# Patient Record
Sex: FEMALE | Race: OTHER | HISPANIC OR LATINO | Employment: FULL TIME | URBAN - METROPOLITAN AREA
[De-identification: names, ages, dates, MRNs, and addresses within clinical notes are randomized per-mention and may not be internally consistent; named-entity substitution may affect disease eponyms.]

---

## 2017-03-21 ENCOUNTER — CONVERSION ENCOUNTER (OUTPATIENT)
Dept: MAMMOGRAPHY | Facility: CLINIC | Age: 52
End: 2017-03-21

## 2018-02-05 ENCOUNTER — CLINICAL SUPPORT (OUTPATIENT)
Dept: PAIN MEDICINE | Facility: CLINIC | Age: 53
End: 2018-02-05
Payer: COMMERCIAL

## 2018-02-05 ENCOUNTER — TELEPHONE (OUTPATIENT)
Dept: RADIOLOGY | Facility: CLINIC | Age: 53
End: 2018-02-05

## 2018-02-05 VITALS
WEIGHT: 98.4 LBS | DIASTOLIC BLOOD PRESSURE: 74 MMHG | HEIGHT: 59 IN | HEART RATE: 88 BPM | SYSTOLIC BLOOD PRESSURE: 104 MMHG | TEMPERATURE: 99 F | BODY MASS INDEX: 19.84 KG/M2

## 2018-02-05 DIAGNOSIS — M48.02 CERVICAL SPINAL STENOSIS: ICD-10-CM

## 2018-02-05 DIAGNOSIS — G89.29 CHRONIC BILATERAL LOW BACK PAIN WITHOUT SCIATICA: ICD-10-CM

## 2018-02-05 DIAGNOSIS — M25.50 POLYARTHRALGIA: ICD-10-CM

## 2018-02-05 DIAGNOSIS — M79.18 MYOFASCIAL PAIN SYNDROME: ICD-10-CM

## 2018-02-05 DIAGNOSIS — M54.12 CERVICAL RADICULOPATHY: Primary | ICD-10-CM

## 2018-02-05 DIAGNOSIS — R20.2 PARESTHESIA OF LOWER EXTREMITY: ICD-10-CM

## 2018-02-05 DIAGNOSIS — M54.50 CHRONIC BILATERAL LOW BACK PAIN WITHOUT SCIATICA: ICD-10-CM

## 2018-02-05 PROCEDURE — 99204 OFFICE O/P NEW MOD 45 MIN: CPT | Performed by: ANESTHESIOLOGY

## 2018-02-05 RX ORDER — ESOMEPRAZOLE MAGNESIUM 40 MG/1
40 CAPSULE, DELAYED RELEASE ORAL
COMMUNITY
End: 2018-08-16 | Stop reason: ALTCHOICE

## 2018-02-05 RX ORDER — TIZANIDINE 2 MG/1
2 TABLET ORAL EVERY 8 HOURS PRN
Qty: 90 TABLET | Refills: 1 | Status: SHIPPED | OUTPATIENT
Start: 2018-02-05 | End: 2018-02-05 | Stop reason: SDUPTHER

## 2018-02-05 RX ORDER — ASPIRIN 81 MG/1
81 TABLET ORAL DAILY
COMMUNITY
End: 2018-12-20 | Stop reason: ALTCHOICE

## 2018-02-05 RX ORDER — MONTELUKAST SODIUM 10 MG/1
10 TABLET ORAL
COMMUNITY
End: 2018-08-16 | Stop reason: ALTCHOICE

## 2018-02-05 RX ORDER — LORATADINE 10 MG/1
10 TABLET ORAL DAILY
COMMUNITY

## 2018-02-05 RX ORDER — COVID-19 ANTIGEN TEST
KIT MISCELLANEOUS
COMMUNITY
End: 2019-08-28 | Stop reason: ALTCHOICE

## 2018-02-05 RX ORDER — FLUTICASONE PROPIONATE 50 MCG
1 SPRAY, SUSPENSION (ML) NASAL DAILY
COMMUNITY
End: 2018-08-16 | Stop reason: ALTCHOICE

## 2018-02-05 RX ORDER — ALPRAZOLAM 0.5 MG/1
TABLET ORAL
COMMUNITY
End: 2018-07-30 | Stop reason: SDUPTHER

## 2018-02-05 RX ORDER — TOPIRAMATE 15 MG/1
15 CAPSULE, COATED PELLETS ORAL 2 TIMES DAILY
COMMUNITY
End: 2018-08-16 | Stop reason: ALTCHOICE

## 2018-02-05 RX ORDER — TIZANIDINE 2 MG/1
2 TABLET ORAL EVERY 8 HOURS PRN
Qty: 90 TABLET | Refills: 0 | Status: SHIPPED | OUTPATIENT
Start: 2018-02-05 | End: 2019-04-30 | Stop reason: ALTCHOICE

## 2018-02-05 NOTE — TELEPHONE ENCOUNTER
RECEIVED SIGNED ANTI COAG HOLD FORM BACK FROM DR Villanueva Head- DATED 2/5/18  COPY WITH PROCEDURE  AND ONE SENT TO BE SCANNED

## 2018-02-05 NOTE — PROGRESS NOTES
Assessment:  1  Cervical radiculopathy    2  Myofascial pain syndrome    3  Cervical spinal stenosis    4  Polyarthralgia    5  Chronic bilateral low back pain without sciatica    6  Paresthesia of lower extremity        Plan:  50-year-old female presenting for initial consultation regarding multiple pain complaints including neck pain and cervical radiculopathy in the bilateral upper extremities secondary to cervical degenerative disc disease and stenosis  The patient also complains of axial lumbosacral back pain which seems to be largely myofascial in nature and there may be a facet mediated component to this  The patient also complains of paresthesias and numbness in her lower extremities from the knees to the feet mainly on the anterior aspect of her legs, however sometimes can encompass the entire lower extremity from the knees to the feet which sounds more like a peripheral neuropathy  The patient did have an EMG of her lower extremities which apparently was incomplete secondary to poor patient tolerance  The patient is seeing a neurologist who had started the patient on Topiramate which is currently at low dose and she has noted some relief since being on the medication for the past 2-3 weeks  She has previously tried duloxetine and gabapentin without much relief  The patient does not have any evidence of radiculopathy on physical exam   She does not have any imaging of her lumbar spine either  The patient also complains of poly arthralgias and considering the patient is only prediabetic and has possible peripheral neuropathy a rheumatologic workup and referral may be reasonable  1   I will schedule the patient for cervical epidural steroid injection to reduce the inflammatory component of her pain  2   The patient will continue with Topiramate as prescribed by her neurologist and she will follow up with them in April    She is noticing some relief in her neuropathic symptoms of her lower extremities since onset of the medication  3  The patient may continue with Aleve p r n , however she minimizes this secondary to GERD  4  The patient will continue with her home exercise program  5  I will trial the patient on tizanidine 2 milligrams q 8 hours p r n  for myofascial pain  6  I will follow up the patient in 6-8 weeks after injection    Complete risks and benefits including bleeding, infection, tissue reaction, nerve injury and allergic reaction were discussed  The approach was demonstrated using models and literature was provided  Verbal and written consent was obtained  My impressions and treatment recommendations were discussed in detail with the patient who verbalized understanding and had no further questions  Discharge instructions were provided  I personally saw and examined the patient and I agree with the above discussed plan of care  No orders of the defined types were placed in this encounter  No orders of the defined types were placed in this encounter  History of Present Illness:    Nena Urena is a 47 y o  female presenting for initial consultation regarding neck pain that radiates into bilateral upper extremities into the 4th and 5th digits with associated numbness and paresthesias  She denies any subjective weakness, bladder or bowel incontinence, or balance issues  The patient also complains of axial lumbosacral back pain and does occasionally feel some numbness and paresthesias from her knees to her feet, particularly over the anterior aspect  The symptoms have been present for at least 2 years  She denies any specific trauma or inciting event to her neck or low back  MRI of the cervical spine reveals a pseudo disc bulge at C4-5 resulting in mild-to-moderate central and foraminal stenosis and a tiny disc protrusion at C5-6 and uncinate hypertrophy at C7-T1 with slight right foraminal stenosis at C7-T1    The patient does have an EMG of her upper extremities revealing C8-T1 radiculopathy per chart review  However, I do not have the results from this study  The EMG that was recently performed was an incomplete study secondary to poor patient tolerance  She does not have any imaging of her lumbar spine  She also complains of poly arthralgias, mainly in her major joints  She has never seen a rheumatologist for this  She is currently following with a neurologist who is treating her with Topiramate 15 milligrams b i d  for peripheral neuropathy per the patient  She does find some relief with this and has been on this dose for about 3 weeks  She follows up with the neurologist in April  She has previously tried Cymbalta and gabapentin, however was unable to tolerate the sedation  The patient rates her pain as 7/10 on the pain is constant  The pain does not follow any particular pattern throughout the day  The pain is described as dull and aching  The pain is increased with standing, bending at the neck and waste, sitting, and walking  The pain is alleviated somewhat with relaxation  She has not gotten any relief from heat or ice application or physical therapy in the past     I have personally reviewed and/or updated the patient's past medical history, past surgical history, family history, social history, allergies, and vital signs today  Other than as stated above, the patient denies any interval changes in medications, medical condition, mental condition, symptoms, or allergies since the last office visit  Review of Systems:    Review of Systems   Constitutional: Negative for fever and unexpected weight change  HENT: Negative for trouble swallowing  Eyes: Negative for visual disturbance  Respiratory: Negative for shortness of breath and wheezing  Cardiovascular: Negative for chest pain and palpitations  Gastrointestinal: Negative for constipation, diarrhea, nausea and vomiting     Endocrine: Negative for cold intolerance, heat intolerance and polydipsia  Genitourinary: Positive for frequency  Negative for difficulty urinating  Musculoskeletal: Negative for arthralgias, joint swelling and myalgias  Joint stiffness    Skin: Negative for rash  Neurological: Positive for dizziness  Negative for seizures, syncope, weakness and headaches  Hematological: Does not bruise/bleed easily  Psychiatric/Behavioral: Negative for dysphoric mood  All other systems reviewed and are negative  There is no problem list on file for this patient  Past Medical History:   Diagnosis Date    Anxiety     GERD (gastroesophageal reflux disease)        Past Surgical History:   Procedure Laterality Date    CARPAL TUNNEL RELEASE       SECTION      ECTOPIC PREGNANCY SURGERY      HYSTERECTOMY      TONSILECTOMY AND ADNOIDECTOMY         Family History   Problem Relation Age of Onset    Asthma Mother     Hypertension Father     Ulcers Brother     Thyroid disease Other     Stroke Other        Social History     Occupational History    Not on file  Social History Main Topics    Smoking status: Not on file    Smokeless tobacco: Not on file    Alcohol use Not on file    Drug use: Unknown    Sexual activity: Not on file       No current outpatient prescriptions on file prior to visit  No current facility-administered medications on file prior to visit  Allergies   Allergen Reactions    Gabapentin Hives       Physical Exam:    /74   Pulse 88   Temp 99 °F (37 2 °C)   Ht 4' 11" (1 499 m)   Wt 44 6 kg (98 lb 6 4 oz)   BMI 19 87 kg/m²     Constitutional: normal, well developed, well nourished, alert, in no distress and non-toxic and no overt pain behavior    Eyes: anicteric  HEENT: grossly intact  Neck: supple, symmetric, trachea midline and no masses   Pulmonary:even and unlabored  Cardiovascular:No edema or pitting edema present  Skin:Normal without rashes or lesions and well hydrated  Psychiatric:Mood and affect appropriate  Neurologic:Cranial Nerves II-XII grossly intact  Musculoskeletal:normal gait  Bilateral cervical paraspinals and trapezii tender to palpation and ropy in texture  Positive Spurling's to the left and negative to the right  Bilateral upper extremity strength 5/5 in all muscle groups  Bilateral brachioradialis, biceps, and triceps reflexes were 2/4 bilaterally  Negative Leahy's reflex  Bilateral patellar and Achilles reflexes were 2/4  Negative clonus bilaterally  Bilateral lumbar paraspinals tender to palpation and ropy in texture  Negative straight leg raise bilaterally  Negative Bill's tests bilaterally  Bilateral lower extremity strength 5/5 in all muscle groups      Imaging:     MRI Cervical spine dated 12/21/2017    Pseudo-disc bulge at C4-5 with resulting mild to moderate spinal stenosis   Tiny central disc protrusion at C5-6 and right uncinate hypertrophy at C7-T1

## 2018-02-05 NOTE — TELEPHONE ENCOUNTER
Faxed anti coag hold form to dr Joselito Chiu at [facility] at [phone], phone- [phone]  Pt to hold aspirin and needs to be scheduled for HECTOR with Dr Nikko Kern in [address]

## 2018-02-05 NOTE — TELEPHONE ENCOUNTER
Called pt, scheduled HECTOR on 2/21/18 w Dr Jihan Hernandez in [place]  Pt last dose of ASA is 2/14, 6 day hold  Went over pre procedure instructions, NPO 1 hr prior, if sick or on abx needs to call to rs, wear loose, comf clothing- no buttons/zippers, needs   Pt verbalized understanding

## 2018-02-21 ENCOUNTER — HOSPITAL ENCOUNTER (OUTPATIENT)
Dept: RADIOLOGY | Facility: CLINIC | Age: 53
Discharge: HOME | End: 2018-02-21
Admitting: ANESTHESIOLOGY
Payer: COMMERCIAL

## 2018-02-21 VITALS
DIASTOLIC BLOOD PRESSURE: 74 MMHG | TEMPERATURE: 97 F | HEART RATE: 97 BPM | RESPIRATION RATE: 16 BRPM | SYSTOLIC BLOOD PRESSURE: 110 MMHG | OXYGEN SATURATION: 100 %

## 2018-02-21 DIAGNOSIS — M54.12 CERVICAL RADICULOPATHY: ICD-10-CM

## 2018-02-21 PROCEDURE — 62321 NJX INTERLAMINAR CRV/THRC: CPT | Performed by: ANESTHESIOLOGY

## 2018-02-21 RX ORDER — LIDOCAINE HYDROCHLORIDE 10 MG/ML
5 INJECTION, SOLUTION EPIDURAL; INFILTRATION; INTRACAUDAL; PERINEURAL ONCE
Status: COMPLETED | OUTPATIENT
Start: 2018-02-21 | End: 2018-02-21

## 2018-02-21 RX ORDER — PAPAVERINE HCL 150 MG
10 CAPSULE, EXTENDED RELEASE ORAL ONCE
Status: COMPLETED | OUTPATIENT
Start: 2018-02-21 | End: 2018-02-21

## 2018-02-21 RX ADMIN — DEXAMETHASONE SODIUM PHOSPHATE 10 MG: 10 INJECTION, SOLUTION INTRAMUSCULAR; INTRAVENOUS at 10:42

## 2018-02-21 RX ADMIN — LIDOCAINE HYDROCHLORIDE 3 ML: 10 INJECTION, SOLUTION EPIDURAL; INFILTRATION; INTRACAUDAL; PERINEURAL at 10:43

## 2018-02-21 RX ADMIN — IOHEXOL 1 ML: 300 INJECTION, SOLUTION INTRAVENOUS at 10:45

## 2018-02-21 NOTE — DISCHARGE INSTRUCTIONS
Epidural Steroid Injection   WHAT YOU NEED TO KNOW:   An epidural steroid injection (OTILIA) is a procedure to inject steroid medicine into the epidural space  The epidural space is between your spinal cord and vertebrae  Steroids reduce inflammation and fluid buildup in your spine that may be causing pain  You may be given pain medicine along with the steroids  ACTIVITY  · Do not drive or operate machinery today  · No strenuous activity today - bending, lifting, etc   · You may resume normal activites starting tomorrow - start slowly and as tolerated  · You may shower today, but no tub baths or hot tubs  · You may have numbness for several hours from the local anesthetic  Please use caution and common sense, especially with weight-bearing activities  CARE OF THE INJECTION SITE  · If you have soreness or pain, apply ice to the area today (20 minutes on/20 minutes off)  · Starting tomorrow, you may use warm, moist heat or ice if needed  · You may have an increase or change in your discomfort for 36-48 hours after your treatment  · Apply ice and continue with any pain medication you have been prescribed  · Notify the [facility] if you have any of the following: redness, drainage, swelling, headache, stiff neck or fever above 100°F     SPECIAL INSTRUCTIONS  · Our office will contact you in approximately 7 days for a progress report  MEDICATIONS  · Continue to take all routine medications  · Our office may have instructed you to hold some medications  If you have a problem specifically related to your procedure, please call our office at [phone]  Problems not related to your procedure should be directed to your primary care physician

## 2018-02-21 NOTE — H&P
History of Present Illness: The patient is a 47 y o  female who presents with complaints of neck and arm pain      Patient Active Problem List   Diagnosis    Cervical radiculopathy    Myofascial pain syndrome    Cervical spinal stenosis    Polyarthralgia    Chronic bilateral low back pain without sciatica    Paresthesia of lower extremity       Past Medical History:   Diagnosis Date    Anxiety     Cervical radiculopathy     Cervical spinal stenosis     GERD (gastroesophageal reflux disease)     Myofascial pain syndrome     Polyarthralgia     TIA (transient ischemic attack)        Past Surgical History:   Procedure Laterality Date    CARPAL TUNNEL RELEASE       SECTION      ECTOPIC PREGNANCY SURGERY      HYSTERECTOMY      TONSILECTOMY AND ADNOIDECTOMY           Current Outpatient Prescriptions:     ALPRAZolam (XANAX) 0 5 mg tablet, Take by mouth daily at bedtime as needed for anxiety, Disp: , Rfl:     aspirin (ECOTRIN LOW STRENGTH) 81 mg EC tablet, Take 81 mg by mouth daily, Disp: , Rfl:     esomeprazole (NexIUM) 40 MG capsule, Take 40 mg by mouth every morning before breakfast, Disp: , Rfl:     fluticasone (FLONASE) 50 mcg/act nasal spray, 1 spray into each nostril daily, Disp: , Rfl:     loratadine (CLARITIN) 10 mg tablet, Take 10 mg by mouth daily, Disp: , Rfl:     montelukast (SINGULAIR) 10 mg tablet, Take 10 mg by mouth daily at bedtime, Disp: , Rfl:     Naproxen Sodium (ALEVE) 220 MG CAPS, Take by mouth, Disp: , Rfl:     RaNITidine HCl (RANITIDINE 75 PO), Take by mouth, Disp: , Rfl:     tiZANidine (ZANAFLEX) 2 mg tablet, Take 1 tablet (2 mg total) by mouth every 8 (eight) hours as needed for muscle spasms, Disp: 90 tablet, Rfl: 0    topiramate (TOPAMAX) 15 mg capsule, Take 15 mg by mouth 2 (two) times a day, Disp: , Rfl:     Allergies   Allergen Reactions    Gabapentin Hives       Physical Exam:   Vitals:    18 0952   BP: 121/85   Pulse: 87   Resp: 18   Temp: (!) 97 °F (36 1 °C)   SpO2: 100%     General: Awake, Alert, Oriented x 3, Mood and affect appropriate  Respiratory: Respirations even and unlabored  Cardiovascular: Peripheral pulses intact; no edema  Musculoskeletal Exam:  Bilateral cervical paraspinals  Tender to palpation and ropy in texture  ASA Score: 2    Assessment:  Cervical radiculopathy    Plan: HECTOR        Assessment:  1  Cervical radiculopathy    2  Myofascial pain syndrome    3  Cervical spinal stenosis    4  Polyarthralgia    5  Chronic bilateral low back pain without sciatica    6  Paresthesia of lower extremity          Plan:  28-year-old female presenting for initial consultation regarding multiple pain complaints including neck pain and cervical radiculopathy in the bilateral upper extremities secondary to cervical degenerative disc disease and stenosis  The patient also complains of axial lumbosacral back pain which seems to be largely myofascial in nature and there may be a facet mediated component to this  The patient also complains of paresthesias and numbness in her lower extremities from the knees to the feet mainly on the anterior aspect of her legs, however sometimes can encompass the entire lower extremity from the knees to the feet which sounds more like a peripheral neuropathy  The patient did have an EMG of her lower extremities which apparently was incomplete secondary to poor patient tolerance  The patient is seeing a neurologist who had started the patient on Topiramate which is currently at low dose and she has noted some relief since being on the medication for the past 2-3 weeks  She has previously tried duloxetine and gabapentin without much relief  The patient does not have any evidence of radiculopathy on physical exam   She does not have any imaging of her lumbar spine either      The patient also complains of poly arthralgias and considering the patient is only prediabetic and has possible peripheral neuropathy a rheumatologic workup and referral may be reasonable  1   I will schedule the patient for cervical epidural steroid injection to reduce the inflammatory component of her pain  2   The patient will continue with Topiramate as prescribed by her neurologist and she will follow up with them in April  She is noticing some relief in her neuropathic symptoms of her lower extremities since onset of the medication  3  The patient may continue with Aleve p r n , however she minimizes this secondary to GERD  4  The patient will continue with her home exercise program  5  I will trial the patient on tizanidine 2 milligrams q 8 hours p r n  for myofascial pain  6  I will follow up the patient in 6-8 weeks after injection     Complete risks and benefits including bleeding, infection, tissue reaction, nerve injury and allergic reaction were discussed  The approach was demonstrated using models and literature was provided  Verbal and written consent was obtained      My impressions and treatment recommendations were discussed in detail with the patient who verbalized understanding and had no further questions  Discharge instructions were provided  I personally saw and examined the patient and I agree with the above discussed plan of care      No orders of the defined types were placed in this encounter      No orders of the defined types were placed in this encounter         History of Present Illness:    Cristian Vigil is a 47 y o  female presenting for initial consultation regarding neck pain that radiates into bilateral upper extremities into the 4th and 5th digits with associated numbness and paresthesias  She denies any subjective weakness, bladder or bowel incontinence, or balance issues  The patient also complains of axial lumbosacral back pain and does occasionally feel some numbness and paresthesias from her knees to her feet, particularly over the anterior aspect    The symptoms have been present for at least 2 years   She denies any specific trauma or inciting event to her neck or low back  MRI of the cervical spine reveals a pseudo disc bulge at C4-5 resulting in mild-to-moderate central and foraminal stenosis and a tiny disc protrusion at C5-6 and uncinate hypertrophy at C7-T1 with slight right foraminal stenosis at C7-T1  The patient does have an EMG of her upper extremities revealing C8-T1 radiculopathy per chart review  However, I do not have the results from this study  The EMG that was recently performed was an incomplete study secondary to poor patient tolerance  She does not have any imaging of her lumbar spine  She also complains of poly arthralgias, mainly in her major joints  She has never seen a rheumatologist for this  She is currently following with a neurologist who is treating her with Topiramate 15 milligrams b i d  for peripheral neuropathy per the patient  She does find some relief with this and has been on this dose for about 3 weeks  She follows up with the neurologist in April  She has previously tried Cymbalta and gabapentin, however was unable to tolerate the sedation  The patient rates her pain as 7/10 on the pain is constant  The pain does not follow any particular pattern throughout the day  The pain is described as dull and aching  The pain is increased with standing, bending at the neck and waste, sitting, and walking  The pain is alleviated somewhat with relaxation  She has not gotten any relief from heat or ice application or physical therapy in the past      I have personally reviewed and/or updated the patient's past medical history, past surgical history, family history, social history, allergies, and vital signs today  Other than as stated above, the patient denies any interval changes in medications, medical condition, mental condition, symptoms, or allergies since the last office visit     Review of Systems:     Review of Systems   Constitutional: Negative for fever and unexpected weight change  HENT: Negative for trouble swallowing  Eyes: Negative for visual disturbance  Respiratory: Negative for shortness of breath and wheezing  Cardiovascular: Negative for chest pain and palpitations  Gastrointestinal: Negative for constipation, diarrhea, nausea and vomiting  Endocrine: Negative for cold intolerance, heat intolerance and polydipsia  Genitourinary: Positive for frequency  Negative for difficulty urinating  Musculoskeletal: Negative for arthralgias, joint swelling and myalgias  Joint stiffness    Skin: Negative for rash  Neurological: Positive for dizziness  Negative for seizures, syncope, weakness and headaches  Hematological: Does not bruise/bleed easily  Psychiatric/Behavioral: Negative for dysphoric mood     All other systems reviewed and are negative         There is no problem list on file for this patient         Medical History        Past Medical History:   Diagnosis Date    Anxiety      GERD (gastroesophageal reflux disease)              Surgical History         Past Surgical History:   Procedure Laterality Date    CARPAL TUNNEL RELEASE         SECTION        ECTOPIC PREGNANCY SURGERY        HYSTERECTOMY        TONSILECTOMY AND ADNOIDECTOMY                      Family History   Problem Relation Age of Onset    Asthma Mother      Hypertension Father      Ulcers Brother      Thyroid disease Other      Stroke Other           Social History          Occupational History    Not on file            Social History Main Topics    Smoking status: Not on file    Smokeless tobacco: Not on file    Alcohol use Not on file    Drug use: Unknown    Sexual activity: Not on file         No current outpatient prescriptions on file prior to visit       No current facility-administered medications on file prior to visit          Allergies   Allergen Reactions    Gabapentin Hives         Physical Exam:     /74   Pulse 88   Temp 99 °F (37 2 °C)   Ht 4' 11" (1 499 m)   Wt 44 6 kg (98 lb 6 4 oz)   BMI 19 87 kg/m²      Constitutional: normal, well developed, well nourished, alert, in no distress and non-toxic and no overt pain behavior  Eyes: anicteric  HEENT: grossly intact  Neck: supple, symmetric, trachea midline and no masses   Pulmonary:even and unlabored  Cardiovascular:No edema or pitting edema present  Skin:Normal without rashes or lesions and well hydrated  Psychiatric:Mood and affect appropriate  Neurologic:Cranial Nerves II-XII grossly intact  Musculoskeletal:normal gait  Bilateral cervical paraspinals and trapezii tender to palpation and ropy in texture  Positive Spurling's to the left and negative to the right  Bilateral upper extremity strength 5/5 in all muscle groups  Bilateral brachioradialis, biceps, and triceps reflexes were 2/4 bilaterally  Negative Leahy's reflex  Bilateral patellar and Achilles reflexes were 2/4  Negative clonus bilaterally  Bilateral lumbar paraspinals tender to palpation and ropy in texture  Negative straight leg raise bilaterally  Negative Bill's tests bilaterally  Bilateral lower extremity strength 5/5 in all muscle groups

## 2018-02-22 ENCOUNTER — TELEPHONE (OUTPATIENT)
Dept: PAIN MEDICINE | Facility: MEDICAL CENTER | Age: 53
End: 2018-02-22

## 2018-02-22 NOTE — TELEPHONE ENCOUNTER
S/w pt, she is s/p HECTOR yesterday 2/21, since the numbing wore off last night she has been having cramping pain in her knees and feet  Pt said the pain comes and goes and that Aleve is helping short term but the pain comes back  Pt is wondering if this could be a side effect  I explained that it could be from positioning on the table during the procedure, I encouraged the pt to continue using the Aleve if it is helping  Pt had to call off work today and is requesting a work excuse for today  Please advise, thanks

## 2018-02-22 NOTE — TELEPHONE ENCOUNTER
Ok to provide pt with work note that she may have today off as a result of discomfort from [place] and can return to work tomorrow

## 2018-02-22 NOTE — TELEPHONE ENCOUNTER
Pt is calling and had injection yesterday and her leg starting hurting her and wants to know if that is a side effect   Please call pt back at [phone]

## 2018-02-23 NOTE — TELEPHONE ENCOUNTER
Attempted to call the patient and left a detailed mom in regards to picking up the excuse  Pt  To CB if any questions, office hours provided

## 2018-02-28 ENCOUNTER — TELEPHONE (OUTPATIENT)
Dept: PAIN MEDICINE | Facility: CLINIC | Age: 53
End: 2018-02-28

## 2018-03-01 NOTE — TELEPHONE ENCOUNTER
Patient reports over all about 75% relief - she said that after the injection she started to feel sick with cold like symptoms and not she is starting to feel better  I advised patient she may continue to see relief  She will f/u PRN

## 2018-03-13 NOTE — TELEPHONE ENCOUNTER
Pt called and stated she doesn't need a follow up because she feels fine  She was upset that she received a bill and stated she should have been told that her procedure wouldn't be covered 100% because she received a bill for $800

## 2018-03-22 ENCOUNTER — CONVERSION ENCOUNTER (OUTPATIENT)
Dept: MAMMOGRAPHY | Facility: CLINIC | Age: 53
End: 2018-03-22

## 2018-03-29 ENCOUNTER — TRANSCRIBE ORDERS (OUTPATIENT)
Dept: ADMINISTRATIVE | Facility: HOSPITAL | Age: 53
End: 2018-03-29

## 2018-03-29 DIAGNOSIS — R10.13 EPIGASTRIC PAIN: Primary | ICD-10-CM

## 2018-04-10 LAB
ABSOL LYMPHOCYTES (HISTORICAL): 2.3 K/UL (ref 0.5–4)
ALBUMIN SERPL BCP-MCNC: 5 G/DL (ref 3–5.2)
ALP SERPL-CCNC: 117 U/L (ref 43–122)
ALT SERPL W P-5'-P-CCNC: 25 U/L (ref 9–52)
AMORPHOUS MATERIAL (HISTORICAL): ABNORMAL
ANION GAP SERPL CALCULATED.3IONS-SCNC: 13 MMOL/L (ref 5–14)
AST SERPL W P-5'-P-CCNC: 29 U/L (ref 14–36)
BACTERIA UR QL AUTO: ABNORMAL
BASOPHILS # BLD AUTO: 0 K/UL (ref 0–0.1)
BASOPHILS # BLD AUTO: 1 % (ref 0–1)
BILIRUB SERPL-MCNC: 0.3 MG/DL
BILIRUB UR QL STRIP: NEGATIVE MG/DL
BUN SERPL-MCNC: 15 MG/DL (ref 5–25)
CALCIUM SERPL-MCNC: 10 MG/DL (ref 8.4–10.2)
CASTS/CASTS TYPE (HISTORICAL): ABNORMAL /LPF
CHLORIDE SERPL-SCNC: 105 MEQ/L (ref 97–108)
CLARITY UR: CLEAR
CO2 SERPL-SCNC: 25 MMOL/L (ref 22–30)
COLOR UR: ABNORMAL
CREATINE, SERUM (HISTORICAL): 0.59 MG/DL (ref 0.6–1.2)
CRYSTAL TYPE (HISTORICAL): ABNORMAL /HPF
DEPRECATED RDW RBC AUTO: 14.2 %
EGFR (HISTORICAL): >60 ML/MIN/1.73 M2
EOSINOPHIL # BLD AUTO: 0.1 K/UL (ref 0–0.4)
EOSINOPHIL NFR BLD AUTO: 1 % (ref 0–6)
GLUCOSE SERPL-MCNC: 87 MG/DL (ref 70–99)
GLUCOSE UR STRIP-MCNC: NEGATIVE MG/DL
HCT VFR BLD AUTO: 44.5 % (ref 36–46)
HGB BLD-MCNC: 14.5 G/DL (ref 12–16)
HGB UR QL STRIP.AUTO: ABNORMAL
KETONES UR STRIP-MCNC: NEGATIVE MG/DL
LEUKOCYTE ESTERASE UR QL STRIP: NEGATIVE
LYMPHOCYTES NFR BLD AUTO: 36 % (ref 25–45)
MCH RBC QN AUTO: 28.9 PG (ref 26–34)
MCHC RBC AUTO-ENTMCNC: 32.7 % (ref 31–36)
MCV RBC AUTO: 89 FL (ref 80–100)
MONOCYTES # BLD AUTO: 0.3 K/UL (ref 0.2–0.9)
MONOCYTES NFR BLD AUTO: 4 % (ref 1–10)
MUCOUS THREADS URNS QL MICRO: ABNORMAL
NEUTROPHILS ABS COUNT (HISTORICAL): 3.8 K/UL (ref 1.8–7.8)
NEUTS SEG NFR BLD AUTO: 58 % (ref 45–65)
NITRITE UR QL STRIP: NEGATIVE
NON-SQ EPI CELLS URNS QL MICRO: ABNORMAL
OTHER STN SPEC: ABNORMAL
PH UR STRIP.AUTO: 6 [PH] (ref 4.5–8)
PLATELET # BLD AUTO: 196 K/MCL (ref 150–450)
POTASSIUM SERPL-SCNC: 4.6 MEQ/L (ref 3.6–5)
PROT UR STRIP-MCNC: NEGATIVE MG/DL
RBC # BLD AUTO: 5.02 M/MCL (ref 4–5.2)
RBC #/AREA URNS AUTO: 1 /HPF
SODIUM SERPL-SCNC: 142 MEQ/L (ref 137–147)
SP GR UR STRIP.AUTO: 1.02 (ref 1–1.04)
TOTAL PROTEIN (HISTORICAL): 7.9 G/DL (ref 5.9–8.4)
UROBILINOGEN UR QL STRIP.AUTO: NEGATIVE MG/DL (ref 0–1)
WBC # BLD AUTO: 6.5 K/MCL (ref 4.5–11)
WBC #/AREA URNS AUTO: ABNORMAL /HPF

## 2018-07-23 DIAGNOSIS — F41.9 ANXIETY: Primary | ICD-10-CM

## 2018-07-23 RX ORDER — ALPRAZOLAM 0.5 MG/1
0.5 TABLET ORAL 2 TIMES DAILY PRN
Qty: 15 TABLET | Refills: 0 | Status: SHIPPED | OUTPATIENT
Start: 2018-07-23 | End: 2019-03-05 | Stop reason: SDUPTHER

## 2018-07-30 DIAGNOSIS — F41.9 ANXIETY: Primary | ICD-10-CM

## 2018-07-30 RX ORDER — ALPRAZOLAM 0.5 MG/1
0.5 TABLET ORAL
Qty: 30 TABLET | Refills: 0 | Status: SHIPPED | OUTPATIENT
Start: 2018-07-30 | End: 2018-11-01 | Stop reason: SDUPTHER

## 2018-08-16 ENCOUNTER — OFFICE VISIT (OUTPATIENT)
Dept: GYNECOLOGY | Facility: CLINIC | Age: 53
End: 2018-08-16
Payer: COMMERCIAL

## 2018-08-16 VITALS
RESPIRATION RATE: 16 BRPM | DIASTOLIC BLOOD PRESSURE: 80 MMHG | BODY MASS INDEX: 19.11 KG/M2 | HEIGHT: 59 IN | SYSTOLIC BLOOD PRESSURE: 132 MMHG | HEART RATE: 88 BPM | WEIGHT: 94.8 LBS

## 2018-08-16 DIAGNOSIS — N95.2 VAGINAL ATROPHY: ICD-10-CM

## 2018-08-16 DIAGNOSIS — N94.10 DYSPAREUNIA, FEMALE: ICD-10-CM

## 2018-08-16 DIAGNOSIS — Z01.411 ENCNTR FOR GYN EXAM (GENERAL) (ROUTINE) W ABNORMAL FINDINGS: Primary | ICD-10-CM

## 2018-08-16 DIAGNOSIS — Z12.31 ENCOUNTER FOR SCREENING MAMMOGRAM FOR BREAST CANCER: ICD-10-CM

## 2018-08-16 PROCEDURE — 99386 PREV VISIT NEW AGE 40-64: CPT | Performed by: NURSE PRACTITIONER

## 2018-08-16 RX ORDER — NORTRIPTYLINE HYDROCHLORIDE 10 MG/1
CAPSULE ORAL EVERY 24 HOURS
COMMUNITY
Start: 2018-05-18 | End: 2018-12-20 | Stop reason: ALTCHOICE

## 2018-08-16 NOTE — PATIENT INSTRUCTIONS
Calcium 1200-1500mg + 600-1000 IU Vit D daily  Paps no longer necessary  Annual mammogram Colonoscopy-done and states not due  Monthly BSE  Exercise 150 minutes per week minimum  Referred to [facility] PT for dyspareunia  Rx sent to pharmacy for vaginal estrogen  Discussed benefits risks and alternative  Silicone based lubricant with sex
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
## 2018-08-20 ENCOUNTER — TELEPHONE (OUTPATIENT)
Dept: GYNECOLOGY | Facility: CLINIC | Age: 53
End: 2018-08-20

## 2018-08-20 NOTE — TELEPHONE ENCOUNTER
Patient aware you will be back in the office on Monday  Needs Ref   For dermatologist  Can this be put in?

## 2018-08-20 NOTE — TELEPHONE ENCOUNTER
Quinn Ambrose,  please ask her what is the reason see the dermatologist for us to give her a referral

## 2018-08-21 DIAGNOSIS — L98.9 SKIN LESIONS: Primary | ICD-10-CM

## 2018-08-21 NOTE — TELEPHONE ENCOUNTER
I spoke with Shara about this the day the patient was seen but the ref  Was never put in  Shara said if she needed one she would do it  There were "spots" that she was concerned about  I couldn't find anything in the notes though

## 2018-08-22 ENCOUNTER — TRANSCRIBE ORDERS (OUTPATIENT)
Dept: ADMINISTRATIVE | Facility: HOSPITAL | Age: 53
End: 2018-08-22

## 2018-08-22 ENCOUNTER — APPOINTMENT (OUTPATIENT)
Dept: LAB | Facility: HOSPITAL | Age: 53
End: 2018-08-22
Payer: COMMERCIAL

## 2018-08-22 DIAGNOSIS — Z00.8 HEALTH EXAMINATION IN POPULATION SURVEY: ICD-10-CM

## 2018-08-22 DIAGNOSIS — Z00.8 HEALTH EXAMINATION IN POPULATION SURVEY: Primary | ICD-10-CM

## 2018-08-22 LAB
CHOLEST SERPL-MCNC: 175 MG/DL
EST. AVERAGE GLUCOSE BLD GHB EST-MCNC: 126 MG/DL
HBA1C MFR BLD: 6 % (ref 4.2–6.3)
HDLC SERPL-MCNC: 66 MG/DL (ref 40–59)
LDLC SERPL CALC-MCNC: 91 MG/DL
NONHDLC SERPL-MCNC: 109 MG/DL
TRIGL SERPL-MCNC: 90 MG/DL

## 2018-08-22 PROCEDURE — 83036 HEMOGLOBIN GLYCOSYLATED A1C: CPT | Performed by: PREVENTIVE MEDICINE

## 2018-08-22 PROCEDURE — 36415 COLL VENOUS BLD VENIPUNCTURE: CPT | Performed by: PREVENTIVE MEDICINE

## 2018-08-22 PROCEDURE — 80061 LIPID PANEL: CPT

## 2018-08-23 ENCOUNTER — OFFICE VISIT (OUTPATIENT)
Dept: FAMILY MEDICINE CLINIC | Facility: CLINIC | Age: 53
End: 2018-08-23
Payer: COMMERCIAL

## 2018-08-23 VITALS
TEMPERATURE: 97.2 F | SYSTOLIC BLOOD PRESSURE: 104 MMHG | HEIGHT: 59 IN | DIASTOLIC BLOOD PRESSURE: 82 MMHG | OXYGEN SATURATION: 97 % | RESPIRATION RATE: 16 BRPM | HEART RATE: 89 BPM | BODY MASS INDEX: 19.44 KG/M2 | WEIGHT: 96.4 LBS

## 2018-08-23 DIAGNOSIS — R09.81 STUFFY NOSE: Primary | ICD-10-CM

## 2018-08-23 PROCEDURE — 3008F BODY MASS INDEX DOCD: CPT | Performed by: FAMILY MEDICINE

## 2018-08-23 PROCEDURE — 99213 OFFICE O/P EST LOW 20 MIN: CPT | Performed by: FAMILY MEDICINE

## 2018-08-23 NOTE — PATIENT INSTRUCTIONS
Diagnoses and all orders for this visit:    Stuffy nose  Comments:  Likely related to allergy  - Cont antihistamin  - Noraml saline spray 2-3 times/day  - Afrin nasla spray twice daily x 4 days  - Start Flonase after 4 days
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
## 2018-09-13 DIAGNOSIS — M25.562 ACUTE PAIN OF LEFT KNEE: Primary | ICD-10-CM

## 2018-09-18 ENCOUNTER — EVALUATION (OUTPATIENT)
Dept: PHYSICAL THERAPY | Facility: REHABILITATION | Age: 53
End: 2018-09-18
Payer: COMMERCIAL

## 2018-09-18 DIAGNOSIS — N95.2 VAGINAL ATROPHY: ICD-10-CM

## 2018-09-18 DIAGNOSIS — N94.10 DYSPAREUNIA, FEMALE: ICD-10-CM

## 2018-09-18 PROCEDURE — 97530 THERAPEUTIC ACTIVITIES: CPT | Performed by: PHYSICAL THERAPIST

## 2018-09-18 PROCEDURE — 97162 PT EVAL MOD COMPLEX 30 MIN: CPT | Performed by: PHYSICAL THERAPIST

## 2018-09-18 PROCEDURE — G8991 OTHER PT/OT GOAL STATUS: HCPCS | Performed by: PHYSICAL THERAPIST

## 2018-09-18 PROCEDURE — G8990 OTHER PT/OT CURRENT STATUS: HCPCS | Performed by: PHYSICAL THERAPIST

## 2018-09-18 NOTE — PROGRESS NOTES
PT Evaluation     Today's date: 2018  Patient name: Elida Otero  : 1965  MRN: [id number]  Referring provider: THOM Baer  Dx:   Encounter Diagnosis     ICD-10-CM    1  Dyspareunia, female N94 10 Ambulatory referral to Physical Therapy   2  Vaginal atrophy N95 2 Ambulatory referral to Physical Therapy                  Assessment  Impairments: abnormal muscle tone, activity intolerance, impaired physical strength, pain with function and poor posture     Assessment details: Pt's problem list: poor posture (FF head and FF/ rounded shoulders), some loss of C-spine extension AROM, c/o pain in pelvic floor region with intimacy, impaired MMT of PFM (about 2/5 with internal assessment)  Understanding of Dx/Px/POC: good   Prognosis: good    Goals  STG's/LTG's  * Normalize findings on sEMG to indicate PFM strength average of at least 12uV and resting average < 2 5uV  * Implements relaxation strategies on a daily basis  * Patient will report 60-70% reduction in discomfort with either palpation or intimacy  * Patient will be compliant with comprehensive home exercise program for self management of condition  Pelvic floor anatomy was explained to patient utilizing a pelvic model and examination technique was discussed, including all risks and precautions  Patient provided verbal and written consent for internal pelvic floor muscle assessment prior to examination             Plan  Patient would benefit from: skilled physical therapy  Planned modality interventions: biofeedback  Planned therapy interventions: stretching, strengthening, patient education, postural training, home exercise program, neuromuscular re-education and manual therapy  Frequency: 1x week  Duration in weeks: 12  Plan of Care beginning date: 2018  Plan of Care expiration date: 2018  Treatment plan discussed with: patient        Subjective Evaluation    History of Present Illness  Mechanism of injury: Patient presents with "years" of pain with intercourse - same partner x 17 years  She correleates it a hysterectomy done about 9 years ago and "he sewed me too small " She notes that she began using Premarin cream about 3 weeks ago and notes significant improvement  OB/GYN:  - csection daughter at 28, son was vaginal at 34years old, partial hysterectomy  due to heavy bleeding and fibroids, no HRT  Bladder: no complaints, denies incontinence  GI: denies irregularity  Exercise: not regularly    Recurrent probem    Quality of life: good    Pain  Current pain ratin  At best pain ratin  At worst pain ratin    Patient Goals  Patient goals for therapy: independence with ADLs/IADLs, return to sport/leisure activities and decreased pain          Objective     Static Posture     Head  Forward  Shoulders  Rounded  Comments  Postural assessment: good spinal alignment and AROM  Mild forward head  Mild loss bilateral hip ER/ER  HS AROM wnl    Palpation     Additional Palpation Details  Pt  c/o discomfort in posterior R knee region with full extension ROM due to a "cyst"  Pt  is scheduled for ortho consult (as per pt )    Active Range of Motion   Cervical/Thoracic Spine   Cervical  Subcranial protraction: WFL   Subcranial retraction: WFL   Flexion: WFL  Extension: Neck active extension: mod loss of C-extensin noted  Left lateral flexion: WFL  Right lateral flexion: WFL  Left rotation: [facility]  Right rotation: [facility]    Thoracic   Flexion: WFL  Extension: [facility]    Lumbar   Flexion: WFL  Extension: WFL  Left lateral flexion: WFL  Right lateral flexion: WFL  Left rotation: WFL  Right rotation: [facility]  Pelvic Floor Exam     External Exam   Position: supine exam    General Perineum Exam: perineum intact  Skin inspection:   no scars present  Additional skin inspection details: No other skin abnormalities noted      Visualization of PFM Contraction: good relaxation of perineal body and voluntary contraction performed  PFM Contraction Comments: Mm  tone: increased on palpation  Mm   weakness noted  MMT of pelvic floor mm: about 2/5 active contraction  No discomfort with active mm  contraction     Cotton swab test: non-tender    Pelvic Floor Muscle Control   Accessory muscle activity: gluteals and abdominals    PERF   Power right: 2/5  Power left: 2/5          Precautions: standard, cervical spine stenosis

## 2018-09-22 ENCOUNTER — TELEPHONE (OUTPATIENT)
Dept: OBGYN CLINIC | Facility: CLINIC | Age: 53
End: 2018-09-22

## 2018-09-22 DIAGNOSIS — N30.01 ACUTE CYSTITIS WITH HEMATURIA: Primary | ICD-10-CM

## 2018-09-22 RX ORDER — NITROFURANTOIN 25; 75 MG/1; MG/1
100 CAPSULE ORAL 2 TIMES DAILY
Qty: 14 CAPSULE | Refills: 0 | Status: SHIPPED | OUTPATIENT
Start: 2018-09-22 | End: 2018-12-20 | Stop reason: ALTCHOICE

## 2018-09-22 NOTE — TELEPHONE ENCOUNTER
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

## 2018-09-25 ENCOUNTER — TELEPHONE (OUTPATIENT)
Dept: NEUROLOGY | Facility: CLINIC | Age: 53
End: 2018-09-25

## 2018-09-25 NOTE — TELEPHONE ENCOUNTER
She said she will stop the medication and just deal with the pain for right now due to the new [facility] insurance she has to pay a deductible of 200 00 to see a specialist so she will just try and hold out until January so she dont have to pay now and then again for the new year

## 2018-09-25 NOTE — TELEPHONE ENCOUNTER
She could be having constipation from the nortriptyline  Unfortunately, she developed hives on gabapentin and I would be hesitant to try the medication again in any form  There are some topical forms of gabapentin but I have never prescribed this and it appears they are in compound creams combined with other medications  She was also intolerant of duloxetine and topiramate at low doses  We had discussion back in May 2018 regarding potential treatments for her neuropathic pain  Unfortunately I do not have any further options to offer and would either defer to pain management or to a neuromuscular specialist for alternative treatment options

## 2018-09-25 NOTE — TELEPHONE ENCOUNTER
Is on nortriptyline 10 mg takes one daily  Has been having issues for the past few months with stomach aches, constipation, and noticing white spots all over her body  She looked up the side effects this morning and these are listed as possible side effects of this medication  She is now wondering if she can start gabapentin again but she wants the gel not the pill form

## 2018-11-01 ENCOUNTER — TELEPHONE (OUTPATIENT)
Dept: FAMILY MEDICINE CLINIC | Facility: CLINIC | Age: 53
End: 2018-11-01

## 2018-11-01 DIAGNOSIS — F41.9 ANXIETY: ICD-10-CM

## 2018-11-01 RX ORDER — ALPRAZOLAM 0.5 MG/1
0.5 TABLET ORAL
Qty: 30 TABLET | Refills: 0 | Status: SHIPPED | OUTPATIENT
Start: 2018-11-01 | End: 2019-01-03 | Stop reason: SDUPTHER

## 2018-11-12 NOTE — PROGRESS NOTES
Daily Note     Today's date: 2018  Patient name: Nena Urena  : 1965  MRN: [id number]  Referring provider: THOM Henao  Dx:   Encounter Diagnosis     ICD-10-CM    1  Dyspareunia, female N94 10                  From evaluation: Pt's problem list: poor posture (FF head and FF/ rounded shoulders), some loss of C-spine extension AROM, c/o pain in pelvic floor region with intimacy, impaired MMT of PFM (about 2/5 with internal assessment)  Precautions: standard, cervical spine stenosis    Subjective: Patient reports that after the last session she feels that she had a UTI with urinary frequency and urgency "every five minutes "  She called OB/GYN her put her on a course of macrodantin with relief  She is hesitant to do any internal manual physical therapy for this reason  Objective: See treatment diary below    Manual                                                                                        Exercise Diary              NMR via  biofeedback See below x 45'                                                                                                                              Biofeedback performed in supine hooklying  Patient presents with elevated tone  Performed 5 second active PFM contractions followed by 10 seconds of rest for 10 repetitions  Strength on average measured 26 0 uV with the goal being > 12 0uV and resting tone measured 9 3 on average with the goal being < 2 5uV  Issued HEP of 5 second holds, 10 seconds of rest to perform 10 times in supine or sitting position with emphasis on resting phase  This is to be performed 3 times per day  Assessment: Tolerated treatment well  Patient would benefit from continued PT  I discussed  Vaginal dilators with her x 8' and discussed how to use them with a model set  Ordering information issued  Plan: Continue per plan of care

## 2018-11-13 ENCOUNTER — OFFICE VISIT (OUTPATIENT)
Dept: PHYSICAL THERAPY | Facility: REHABILITATION | Age: 53
End: 2018-11-13
Payer: COMMERCIAL

## 2018-11-13 DIAGNOSIS — N94.10 DYSPAREUNIA, FEMALE: Primary | ICD-10-CM

## 2018-11-13 PROCEDURE — 97112 NEUROMUSCULAR REEDUCATION: CPT | Performed by: PHYSICAL THERAPIST

## 2018-11-13 PROCEDURE — 97530 THERAPEUTIC ACTIVITIES: CPT | Performed by: PHYSICAL THERAPIST

## 2018-11-20 ENCOUNTER — APPOINTMENT (OUTPATIENT)
Dept: PHYSICAL THERAPY | Facility: REHABILITATION | Age: 53
End: 2018-11-20
Payer: COMMERCIAL

## 2018-11-27 ENCOUNTER — OFFICE VISIT (OUTPATIENT)
Dept: PHYSICAL THERAPY | Facility: REHABILITATION | Age: 53
End: 2018-11-27
Payer: COMMERCIAL

## 2018-11-27 DIAGNOSIS — N94.10 DYSPAREUNIA, FEMALE: Primary | ICD-10-CM

## 2018-11-27 PROCEDURE — G8991 OTHER PT/OT GOAL STATUS: HCPCS | Performed by: PHYSICAL THERAPIST

## 2018-11-27 PROCEDURE — 97110 THERAPEUTIC EXERCISES: CPT | Performed by: PHYSICAL THERAPIST

## 2018-11-27 PROCEDURE — 97140 MANUAL THERAPY 1/> REGIONS: CPT | Performed by: PHYSICAL THERAPIST

## 2018-11-27 PROCEDURE — G8990 OTHER PT/OT CURRENT STATUS: HCPCS | Performed by: PHYSICAL THERAPIST

## 2018-11-27 NOTE — PROGRESS NOTES
Daily Note/ Discharge     Today's date: 2018  Patient name: Cindy Linares  : 1965  MRN: [id number]  Referring provider: THOM Jung  Dx:   Encounter Diagnosis     ICD-10-CM    1  Dyspareunia, female N94 10      UPDATED ON 1/3/19: PATIENT CANCELED LAST SCHEDULED APPOINTMENT  SHE WILL BE DISCHARGED AT THIS TIME  From evaluation: Pt's problem list: poor posture (FF head and FF/ rounded shoulders), some loss of C-spine extension AROM, c/o pain in pelvic floor region with intimacy, impaired MMT of PFM (about 2/5 with internal assessment)  Precautions: standard, cervical spine stenosis    Subjective: Patient reports that she only did her therapy exercises one time a day  "I'm lazy when it comes to exercise " She brought dilator set to today's visit for the first time  Objective: See treatment diary below    Manual              PFM stretching  W/dilators x 25'                                                                         Exercise Diary            NMR via  biofeedback See below x 45'                           nustep   L3 x8'           HS stretch with strap    3x20" each          SKTC    3x20" each                                                                     Assessment: Tolerated treatment well  Patient would benefit from continued PT  Stiffness noted with TE today and encouraged to consider beginner yoga  We worked with dilator and she tolerated full insertion of dilator #1 and #2 with good response and minimal discomfort  Emphasized anterior portion of PF where she describes the most discomfort  Expressed good understanding of technique and advised to use the dilators and do yoga on every other day basis  Plan: Continue per plan of care  Will return next week for likely last appt

## 2018-12-07 ENCOUNTER — OFFICE VISIT (OUTPATIENT)
Dept: FAMILY MEDICINE CLINIC | Facility: CLINIC | Age: 53
End: 2018-12-07
Payer: COMMERCIAL

## 2018-12-07 VITALS
HEART RATE: 78 BPM | RESPIRATION RATE: 18 BRPM | TEMPERATURE: 97.9 F | SYSTOLIC BLOOD PRESSURE: 100 MMHG | WEIGHT: 96 LBS | BODY MASS INDEX: 19.35 KG/M2 | DIASTOLIC BLOOD PRESSURE: 70 MMHG | HEIGHT: 59 IN

## 2018-12-07 DIAGNOSIS — H66.001 ACUTE SUPPURATIVE OTITIS MEDIA OF RIGHT EAR WITHOUT SPONTANEOUS RUPTURE OF TYMPANIC MEMBRANE, RECURRENCE NOT SPECIFIED: Primary | ICD-10-CM

## 2018-12-07 PROCEDURE — 3008F BODY MASS INDEX DOCD: CPT | Performed by: FAMILY MEDICINE

## 2018-12-07 PROCEDURE — 99213 OFFICE O/P EST LOW 20 MIN: CPT | Performed by: FAMILY MEDICINE

## 2018-12-07 RX ORDER — LANSOPRAZOLE 30 MG/1
CAPSULE, DELAYED RELEASE ORAL
COMMUNITY
Start: 2018-11-13 | End: 2019-04-30 | Stop reason: ALTCHOICE

## 2018-12-07 RX ORDER — AMOXICILLIN 500 MG/1
500 CAPSULE ORAL EVERY 8 HOURS SCHEDULED
Qty: 14 CAPSULE | Refills: 0 | Status: SHIPPED | OUTPATIENT
Start: 2018-12-07 | End: 2018-12-14

## 2018-12-07 RX ORDER — FLUCONAZOLE 150 MG/1
TABLET ORAL
COMMUNITY
Start: 2018-10-29 | End: 2019-08-28 | Stop reason: ALTCHOICE

## 2018-12-07 NOTE — PROGRESS NOTES
Assessment/Plan:         Diagnoses and all orders for this visit:    Acute suppurative otitis media of right ear without spontaneous rupture of tympanic membrane, recurrence not specified  Comments:  - Amoxicillin as instructed  - Warm salt water gargling  - Tylenol as needed for fever or pain  Orders:  -     amoxicillin (AMOXIL) 500 mg capsule; Take 1 capsule (500 mg total) by mouth every 8 (eight) hours for 7 days          Subjective:      Patient ID: Asmita Tinajero is a 46 y o  female  This past Tuesday patient started having vertigo followed by sore throat and earache  Other symptoms include nausea and diarrhea  Pt reports having cough with yellow sputum  Tylenol did not help  Meclizine helps temporarily  Dizziness   Associated symptoms include chills, coughing, fatigue, headaches, nausea and a sore throat  Pertinent negatives include no chest pain, congestion or fever  Headache    Associated symptoms include coughing, dizziness, nausea and a sore throat  Pertinent negatives include no fever, rhinorrhea or sinus pressure  Nausea   Associated symptoms include chills, coughing, fatigue, headaches, nausea and a sore throat  Pertinent negatives include no chest pain, congestion or fever  The following portions of the patient's history were reviewed and updated as appropriate: allergies, current medications, past family history, past medical history, past social history, past surgical history and problem list     Review of Systems   Constitutional: Positive for chills and fatigue  Negative for fever  HENT: Positive for postnasal drip, sneezing and sore throat  Negative for congestion, rhinorrhea, sinus pain and sinus pressure  Respiratory: Positive for cough  Negative for chest tightness and shortness of breath  Cardiovascular: Negative for chest pain  Gastrointestinal: Positive for nausea  Neurological: Positive for dizziness and headaches           Objective:      /70 (BP Location: Left arm, Patient Position: Sitting, Cuff Size: Standard)   Pulse 78   Temp 97 9 °F (36 6 °C) (Tympanic)   Resp 18   Ht 4' 11" (1 499 m)   Wt 43 5 kg (96 lb)   BMI 19 39 kg/m²          Physical Exam   Constitutional: She appears well-developed  She is cooperative  She appears ill  HENT:   Head: Atraumatic  Right Ear: Tympanic membrane is bulging  A middle ear effusion is present  Left Ear: Tympanic membrane and ear canal normal    Eyes: Pupils are equal, round, and reactive to light  Conjunctivae and EOM are normal    Neck: Normal range of motion  Neck supple  No JVD present  Cardiovascular: Normal rate, regular rhythm and normal heart sounds  No murmur heard  Pulmonary/Chest: Effort normal and breath sounds normal  She has no wheezes  Neurological: She is alert

## 2018-12-07 NOTE — PATIENT INSTRUCTIONS
Teresa Knowles was seen today for cold like symptoms, dizziness, headache and nausea  Diagnoses and all orders for this visit:    Acute suppurative otitis media of right ear without spontaneous rupture of tympanic membrane, recurrence not specified  Comments:  - Amoxicillin as instructed  - Warm salt water gargling  - Tylenol as needed for fever or pain  Orders:  -     amoxicillin (AMOXIL) 500 mg capsule;  Take 1 capsule (500 mg total) by mouth every 8 (eight) hours for 7 days

## 2018-12-19 NOTE — PROGRESS NOTES
Assessment/Plan:   Continue monthly BSE and annual mammogram  Mammo due 3/19  Decrease caffeine intake  Call office with any persistent breast pain or any abnormal findings on exam        Diagnoses and all orders for this visit:    Mastalgia    Other orders  -     pantoprazole (PROTONIX) 40 mg tablet;               Subjective:      Patient ID: Maggy Alba is a 46 y o  female  Maggy Alba is a 46 y o  female who is here today for a problem visit  C/o bilateral "all around" breast pain x 1 month  Rates pain 4/10  Alleve lessens her breast pain  No nipple discharge  No breast lumps palpable  Hx of vag inal hysterectomy due to AUB in 2009  Completed PF PT and states dyspareunia has lessened  Maggy Alba is sexually active with male partner of 17 years  Normal mammo 3/18  The following portions of the patient's history were reviewed and updated as appropriate: allergies, current medications, past family history, past medical history, past social history, past surgical history and problem list     Review of Systems   Constitutional: Negative  Respiratory: Negative for chest tightness and shortness of breath  Cardiovascular: Negative for chest pain and palpitations  Gastrointestinal: Negative for abdominal distention, abdominal pain, constipation, diarrhea, nausea, rectal pain and vomiting  Genitourinary: Positive for dyspareunia  Negative for flank pain, pelvic pain, vaginal discharge and vaginal pain  Musculoskeletal: Positive for back pain  Negative for gait problem and neck stiffness  Skin: Negative  Neurological: Negative for weakness and light-headedness  Hematological: Negative for adenopathy  Psychiatric/Behavioral: Negative            Objective:      /68 (BP Location: Right arm, Patient Position: Sitting)   Pulse 95   Ht 4' 11" (1 499 m)   Wt 44 7 kg (98 lb 9 6 oz)   LMP  (LMP Unknown)   BMI 19 91 kg/m²          Physical Exam   Constitutional: She is oriented to person, place, and time  She appears well-developed and well-nourished  Abdominal: Soft  Genitourinary: No breast swelling, tenderness, discharge or bleeding  Genitourinary Comments: Normal breast exam   Lymphadenopathy:     She has no cervical adenopathy (no supra or infraclavicular lymphadenopathy)  Neurological: She is alert and oriented to person, place, and time  Skin: Skin is warm and dry  Psychiatric: She has a normal mood and affect  Nursing note and vitals reviewed

## 2018-12-20 ENCOUNTER — OFFICE VISIT (OUTPATIENT)
Dept: GYNECOLOGY | Facility: CLINIC | Age: 53
End: 2018-12-20
Payer: COMMERCIAL

## 2018-12-20 VITALS
HEART RATE: 95 BPM | HEIGHT: 59 IN | SYSTOLIC BLOOD PRESSURE: 116 MMHG | BODY MASS INDEX: 19.88 KG/M2 | WEIGHT: 98.6 LBS | DIASTOLIC BLOOD PRESSURE: 68 MMHG

## 2018-12-20 DIAGNOSIS — N64.4 MASTALGIA: Primary | ICD-10-CM

## 2018-12-20 PROCEDURE — 99213 OFFICE O/P EST LOW 20 MIN: CPT | Performed by: NURSE PRACTITIONER

## 2018-12-20 RX ORDER — PANTOPRAZOLE SODIUM 40 MG/1
TABLET, DELAYED RELEASE ORAL
COMMUNITY
Start: 2018-12-13 | End: 2020-01-24 | Stop reason: SDUPTHER

## 2018-12-20 NOTE — PATIENT INSTRUCTIONS
Continue monthly BSE and annual mammogram  Mammo due 3/19  Decrease caffeine intake  Call office with any persistent breast pain or any abnormal findings on exam

## 2019-01-03 ENCOUNTER — TELEPHONE (OUTPATIENT)
Dept: FAMILY MEDICINE CLINIC | Facility: CLINIC | Age: 54
End: 2019-01-03

## 2019-01-03 DIAGNOSIS — F41.9 ANXIETY: ICD-10-CM

## 2019-01-03 RX ORDER — ALPRAZOLAM 0.5 MG/1
0.5 TABLET ORAL
Qty: 30 TABLET | Refills: 0 | Status: SHIPPED | OUTPATIENT
Start: 2019-01-03 | End: 2019-03-05 | Stop reason: SDUPTHER

## 2019-01-03 NOTE — TELEPHONE ENCOUNTER
Patient called and wants a refill on alprazolam 0 5 mg also wants to know if you can prescribe her nicotine patches and gum to [place] [facility]

## 2019-01-09 ENCOUNTER — TELEPHONE (OUTPATIENT)
Dept: FAMILY MEDICINE CLINIC | Facility: CLINIC | Age: 54
End: 2019-01-09

## 2019-01-09 NOTE — TELEPHONE ENCOUNTER
Patient call yesterday stating she did not receive any medication it was alprazolam 0 5 mg also wanted nioctine patches and the gum sent to [place] [facility] [address] [place] [address]

## 2019-01-10 ENCOUNTER — TELEPHONE (OUTPATIENT)
Dept: FAMILY MEDICINE CLINIC | Facility: CLINIC | Age: 54
End: 2019-01-10

## 2019-01-11 ENCOUNTER — TELEPHONE (OUTPATIENT)
Dept: FAMILY MEDICINE CLINIC | Facility: CLINIC | Age: 54
End: 2019-01-11

## 2019-01-14 ENCOUNTER — TELEPHONE (OUTPATIENT)
Dept: FAMILY MEDICINE CLINIC | Facility: CLINIC | Age: 54
End: 2019-01-14

## 2019-01-14 DIAGNOSIS — Z72.0 TOBACCO ABUSE: Primary | ICD-10-CM

## 2019-01-14 RX ORDER — NICOTINE 21 MG/24HR
1 PATCH, TRANSDERMAL 24 HOURS TRANSDERMAL EVERY 24 HOURS
Qty: 28 PATCH | Refills: 1 | Status: SHIPPED | OUTPATIENT
Start: 2019-01-14 | End: 2019-07-23 | Stop reason: ALTCHOICE

## 2019-01-14 NOTE — TELEPHONE ENCOUNTER
Tarsha Joy called stated she got Dr Rasta Maguire message  She would like the patches called into pharmacy   She says she knows she can get them

## 2019-03-01 DIAGNOSIS — F41.9 ANXIETY: ICD-10-CM

## 2019-03-01 NOTE — TELEPHONE ENCOUNTER
Patient called in requesting a refill on her Alprazolam 0 5mg to be sent to her [address] [place] [address] Pharmacy on file    Last follow up ov was 7/28/2017

## 2019-03-05 ENCOUNTER — TELEPHONE (OUTPATIENT)
Dept: FAMILY MEDICINE CLINIC | Facility: CLINIC | Age: 54
End: 2019-03-05

## 2019-03-05 DIAGNOSIS — F41.9 ANXIETY: ICD-10-CM

## 2019-03-05 RX ORDER — ALPRAZOLAM 0.5 MG/1
0.5 TABLET ORAL
Qty: 30 TABLET | Refills: 0 | Status: SHIPPED | OUTPATIENT
Start: 2019-03-05 | End: 2019-03-05 | Stop reason: SDUPTHER

## 2019-03-05 RX ORDER — ALPRAZOLAM 0.5 MG/1
0.5 TABLET ORAL 2 TIMES DAILY PRN
Qty: 15 TABLET | Refills: 0 | Status: SHIPPED | OUTPATIENT
Start: 2019-03-05 | End: 2019-04-30 | Stop reason: SDUPTHER

## 2019-03-25 ENCOUNTER — HOSPITAL ENCOUNTER (OUTPATIENT)
Dept: RADIOLOGY | Age: 54
Discharge: HOME | End: 2019-03-25
Payer: COMMERCIAL

## 2019-03-25 VITALS — BODY MASS INDEX: 19.76 KG/M2 | WEIGHT: 98 LBS | HEIGHT: 59 IN

## 2019-03-25 DIAGNOSIS — Z12.31 ENCOUNTER FOR SCREENING MAMMOGRAM FOR BREAST CANCER: ICD-10-CM

## 2019-03-25 PROCEDURE — 77067 SCR MAMMO BI INCL CAD: CPT

## 2019-03-25 PROCEDURE — 77063 BREAST TOMOSYNTHESIS BI: CPT

## 2019-04-30 ENCOUNTER — OFFICE VISIT (OUTPATIENT)
Dept: FAMILY MEDICINE CLINIC | Facility: CLINIC | Age: 54
End: 2019-04-30
Payer: COMMERCIAL

## 2019-04-30 VITALS
SYSTOLIC BLOOD PRESSURE: 132 MMHG | HEIGHT: 59 IN | TEMPERATURE: 98.8 F | WEIGHT: 98.4 LBS | DIASTOLIC BLOOD PRESSURE: 90 MMHG | RESPIRATION RATE: 18 BRPM | BODY MASS INDEX: 19.84 KG/M2 | HEART RATE: 90 BPM

## 2019-04-30 DIAGNOSIS — R11.0 NAUSEA: ICD-10-CM

## 2019-04-30 DIAGNOSIS — F41.9 ANXIETY: Primary | ICD-10-CM

## 2019-04-30 DIAGNOSIS — M67.432 GANGLION CYST OF DORSUM OF LEFT WRIST: ICD-10-CM

## 2019-04-30 PROCEDURE — 99212 OFFICE O/P EST SF 10 MIN: CPT | Performed by: FAMILY MEDICINE

## 2019-04-30 PROCEDURE — 3008F BODY MASS INDEX DOCD: CPT | Performed by: FAMILY MEDICINE

## 2019-04-30 PROCEDURE — 20612 ASPIRATE/INJ GANGLION CYST: CPT | Performed by: FAMILY MEDICINE

## 2019-04-30 RX ORDER — ONDANSETRON 4 MG/1
4 TABLET, FILM COATED ORAL EVERY 12 HOURS PRN
Qty: 15 TABLET | Refills: 0 | Status: SHIPPED | OUTPATIENT
Start: 2019-04-30 | End: 2019-07-23 | Stop reason: ALTCHOICE

## 2019-04-30 RX ORDER — ALPRAZOLAM 0.5 MG/1
0.5 TABLET ORAL 2 TIMES DAILY PRN
Qty: 15 TABLET | Refills: 0 | Status: SHIPPED | OUTPATIENT
Start: 2019-04-30 | End: 2019-07-23 | Stop reason: SDUPTHER

## 2019-04-30 RX ORDER — LIDOCAINE HYDROCHLORIDE 10 MG/ML
0.5 INJECTION, SOLUTION INFILTRATION; PERINEURAL
Status: COMPLETED | OUTPATIENT
Start: 2019-04-30 | End: 2019-04-30

## 2019-04-30 RX ADMIN — LIDOCAINE HYDROCHLORIDE 0.5 ML: 10 INJECTION, SOLUTION INFILTRATION; PERINEURAL at 13:35

## 2019-05-29 ENCOUNTER — TELEPHONE (OUTPATIENT)
Dept: FAMILY MEDICINE CLINIC | Facility: CLINIC | Age: 54
End: 2019-05-29

## 2019-07-23 ENCOUNTER — OFFICE VISIT (OUTPATIENT)
Dept: FAMILY MEDICINE CLINIC | Facility: CLINIC | Age: 54
End: 2019-07-23
Payer: COMMERCIAL

## 2019-07-23 VITALS
TEMPERATURE: 97.7 F | RESPIRATION RATE: 18 BRPM | BODY MASS INDEX: 19.48 KG/M2 | HEART RATE: 78 BPM | DIASTOLIC BLOOD PRESSURE: 88 MMHG | SYSTOLIC BLOOD PRESSURE: 128 MMHG | WEIGHT: 96.6 LBS | HEIGHT: 59 IN

## 2019-07-23 DIAGNOSIS — R07.81 RIB PAIN: Primary | ICD-10-CM

## 2019-07-23 DIAGNOSIS — F41.9 ANXIETY: ICD-10-CM

## 2019-07-23 DIAGNOSIS — Z12.11 SCREEN FOR COLON CANCER: ICD-10-CM

## 2019-07-23 PROCEDURE — 3008F BODY MASS INDEX DOCD: CPT | Performed by: FAMILY MEDICINE

## 2019-07-23 PROCEDURE — 99213 OFFICE O/P EST LOW 20 MIN: CPT | Performed by: FAMILY MEDICINE

## 2019-07-23 RX ORDER — ALPRAZOLAM 0.5 MG/1
0.5 TABLET ORAL 2 TIMES DAILY PRN
Qty: 30 TABLET | Refills: 0 | Status: SHIPPED | OUTPATIENT
Start: 2019-07-23 | End: 2019-10-09 | Stop reason: SINTOL

## 2019-07-23 RX ORDER — MECLIZINE HYDROCHLORIDE 25 MG/1
1 TABLET ORAL EVERY 8 HOURS PRN
COMMUNITY
Start: 2017-02-14

## 2019-07-23 NOTE — PATIENT INSTRUCTIONS
Harry Taylor was seen today for pain  Diagnoses and all orders for this visit:    Rib pain  Comments:  Most likely mechanical    Orders:  -     XR ribs 2 vw left; Future    Screen for colon cancer  -     Ambulatory referral to Gastroenterology; Future    Anxiety  -     ALPRAZolam (XANAX) 0 5 mg tablet;  Take 1 tablet (0 5 mg total) by mouth 2 (two) times a day as needed for anxiety

## 2019-07-23 NOTE — PROGRESS NOTES
Assessment/Plan:     Diagnoses and all orders for this visit:    Screen for colon cancer  -     Ambulatory referral to Gastroenterology; Future    Other orders  -     meclizine (ANTIVERT) 25 mg tablet; 1 tablet 3 (three) times a day          Subjective:      Patient ID: Elida Otero is a 46 y o  female  C/O rib pain x 6 months  She c/o pain in 3 locations - under the left upper chest wall, under the left breast and left lower chest wall  She was recent seen by Gyn and states that pain is not related to breast  Pain is sharp, Pain is intermittent and sharp in nature  Pain is unpredictable but on an average, it comes every 3 days  Pain lasts for only minutes with a severity of 7/10  The following portions of the patient's history were reviewed and updated as appropriate: allergies, past family history, past social history, past surgical history and problem list     She  has a past medical history of Anxiety, Cervical radiculopathy, Cervical spinal stenosis, GERD (gastroesophageal reflux disease), Myofascial pain syndrome, Polyarthralgia, and TIA (transient ischemic attack)  Current Outpatient Medications   Medication Sig Dispense Refill    ALPRAZolam (XANAX) 0 5 mg tablet Take 1 tablet (0 5 mg total) by mouth 2 (two) times a day as needed for anxiety 15 tablet 0    fluconazole (DIFLUCAN) 150 mg tablet       loratadine (CLARITIN) 10 mg tablet Take 10 mg by mouth daily      meclizine (ANTIVERT) 25 mg tablet 1 tablet 3 (three) times a day      Naproxen Sodium (ALEVE) 220 MG CAPS Take by mouth      nicotine polacrilex (NICORETTE) 2 mg gum Chew 1 each (2 mg total) 3 (three) times a day as needed for smoking cessation 100 each 1    pantoprazole (PROTONIX) 40 mg tablet        No current facility-administered medications for this visit  Review of Systems   Constitutional: Negative for appetite change, fatigue and unexpected weight change  Respiratory: Negative for cough and shortness of breath  Cardiovascular: Negative for chest pain  Musculoskeletal: Negative for back pain  Neurological: Negative for numbness  Objective:      /88 (BP Location: Right arm, Patient Position: Sitting, Cuff Size: Adult)   Pulse 78   Temp 97 7 °F (36 5 °C) (Temporal)   Resp 18   Ht 4' 11" (1 499 m)   Wt 43 8 kg (96 lb 9 6 oz)   LMP  (LMP Unknown)   BMI 19 51 kg/m²          Physical Exam   Eyes: Pupils are equal, round, and reactive to light  EOM are normal    Cardiovascular: Normal rate and regular rhythm  Pulmonary/Chest: Effort normal and breath sounds normal    Musculoskeletal:   Tenderness over a small area of the lateral aspect of 3rd & 6th or 7th rib and over the anterior aspect of 5th rib of left side      Psychiatric:   Anxious

## 2019-07-24 ENCOUNTER — HOSPITAL ENCOUNTER (OUTPATIENT)
Dept: RADIOLOGY | Facility: HOSPITAL | Age: 54
Discharge: HOME | End: 2019-07-24
Attending: FAMILY MEDICINE
Payer: COMMERCIAL

## 2019-07-24 DIAGNOSIS — R07.81 RIB PAIN: ICD-10-CM

## 2019-07-24 PROCEDURE — 71100 X-RAY EXAM RIBS UNI 2 VIEWS: CPT

## 2019-08-27 NOTE — PROGRESS NOTES
Assessment/Plan:     Calcium 1200-1500mg + 600-1000 IU Vit D daily  Annual mammogram Colonoscopy-up to date  Monthly BSE  Exercise 150 minutes per week minimum  Kegels 20 times twice daily  Silicone based lubricant with sex  Estrace use as directed  Consider use of penile support to avoid deep thrusting with sex (Michelle Potts)  Smoking cessation encouraged  Negative depression screen  Diagnoses and all orders for this visit:    Encntr for gyn exam (general) (routine) w abnormal findings    Encounter for screening mammogram for breast cancer  -     Mammo screening bilateral w 3d & cad; Future    Dyspareunia in female  -     estradiol (ESTRACE) 0 1 mg/g vaginal cream; Use 0 5 gm daily x 2 weeks then 0 5 gm twice weekly    Vaginal atrophy  -     estradiol (ESTRACE) 0 1 mg/g vaginal cream; Use 0 5 gm daily x 2 weeks then 0 5 gm twice weekly    Cigarette nicotine dependence without complication    Other orders  -     Cancel: Mammo screening bilateral w 3d & cad; Future  -     Cancel: Liquid-based pap, screening              Subjective:      Patient ID: Yasmany Gurrola is a 46 y o  female  Yasmany Gurrola is a 46 y o  female who is here today for her annual visit  Admits to intermittent lower back pain  Hx of vaginal hysterectomy due to AUB in 2009  Unilateral oophorectomy  No vaginal bleeding  Yasmany Gurrola is not sexually active with male partner/ of 7 years but together 18 years  Admits to decreased desire and  thrusting dyspareunia  Minimal vaginal lubrication with sex  She feels her vagina is shortened  Does not exercise  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  The following portions of the patient's history were reviewed and updated as appropriate: allergies, current medications, past family history, past medical history, past social history, past surgical history and problem list     Review of Systems   Constitutional: Negative    Negative for activity change, appetite change, chills, diaphoresis, fatigue, fever and unexpected weight change  HENT: Negative for congestion, dental problem, sneezing, sore throat and trouble swallowing  Eyes: Negative for visual disturbance  Respiratory: Negative for chest tightness and shortness of breath  Cardiovascular: Negative for chest pain and leg swelling  Gastrointestinal: Negative for abdominal pain, constipation, diarrhea, nausea and vomiting  Genitourinary: Positive for dyspareunia and vaginal pain  Negative for difficulty urinating, dysuria, frequency, hematuria, pelvic pain, urgency, vaginal bleeding and vaginal discharge  Musculoskeletal: Positive for back pain  Negative for gait problem and neck pain  Skin: Negative  Allergic/Immunologic: Negative  Neurological: Negative for weakness and headaches  Hematological: Negative for adenopathy  Psychiatric/Behavioral: Negative  Objective:      /60 (BP Location: Left arm, Patient Position: Sitting, Cuff Size: Standard)   Pulse 98   Wt 43 3 kg (95 lb 6 4 oz)   LMP  (LMP Unknown)   BMI 19 27 kg/m²          Physical Exam   Constitutional: She is oriented to person, place, and time  Vital signs are normal  She appears well-developed and well-nourished  HENT:   Head: Normocephalic and atraumatic  Eyes: Right eye exhibits no discharge  Left eye exhibits no discharge  Neck: Trachea normal and normal range of motion  Neck supple  No thyromegaly present  Cardiovascular: Normal rate, regular rhythm, normal heart sounds and intact distal pulses  Pulmonary/Chest: Effort normal and breath sounds normal  Right breast exhibits no inverted nipple, no mass, no nipple discharge, no skin change and no tenderness  Left breast exhibits no inverted nipple, no mass, no nipple discharge, no skin change and no tenderness  No breast tenderness, discharge or bleeding  Breasts are symmetrical    Abdominal: Soft  Normal appearance     Genitourinary: Vagina normal  Rectal exam shows no external hemorrhoid  No breast tenderness, discharge or bleeding  Pelvic exam was performed with patient supine  No labial fusion  There is no rash, tenderness, lesion or injury on the right labia  There is no rash, tenderness, lesion or injury on the left labia  Right adnexum displays no mass, no tenderness and no fullness  Left adnexum displays no mass, no tenderness and no fullness  No erythema, tenderness or bleeding in the vagina  No foreign body in the vagina  No signs of injury around the vagina  No vaginal discharge found  Genitourinary Comments: Uterus and cervix are surgically removed  Ovary are non tender, non palpable   Musculoskeletal: Normal range of motion  Lymphadenopathy:        Head (right side): No submental, no submandibular and no tonsillar adenopathy present  Head (left side): No submental, no submandibular and no tonsillar adenopathy present  She has no cervical adenopathy  She has no axillary adenopathy  No inguinal adenopathy noted on the right or left side  Right: No inguinal adenopathy present  Left: No inguinal adenopathy present  Neurological: She is alert and oriented to person, place, and time  Skin: Skin is warm and dry  Psychiatric: She has a normal mood and affect  Nursing note and vitals reviewed

## 2019-08-28 ENCOUNTER — ANNUAL EXAM (OUTPATIENT)
Dept: GYNECOLOGY | Facility: CLINIC | Age: 54
End: 2019-08-28
Payer: COMMERCIAL

## 2019-08-28 VITALS
WEIGHT: 95.4 LBS | HEART RATE: 98 BPM | BODY MASS INDEX: 19.27 KG/M2 | SYSTOLIC BLOOD PRESSURE: 100 MMHG | DIASTOLIC BLOOD PRESSURE: 60 MMHG

## 2019-08-28 DIAGNOSIS — N95.2 VAGINAL ATROPHY: ICD-10-CM

## 2019-08-28 DIAGNOSIS — Z12.31 ENCOUNTER FOR SCREENING MAMMOGRAM FOR BREAST CANCER: ICD-10-CM

## 2019-08-28 DIAGNOSIS — F17.210 CIGARETTE NICOTINE DEPENDENCE WITHOUT COMPLICATION: ICD-10-CM

## 2019-08-28 DIAGNOSIS — N94.10 DYSPAREUNIA IN FEMALE: ICD-10-CM

## 2019-08-28 DIAGNOSIS — Z01.411 ENCNTR FOR GYN EXAM (GENERAL) (ROUTINE) W ABNORMAL FINDINGS: Primary | ICD-10-CM

## 2019-08-28 PROBLEM — Z01.419 ENCNTR FOR GYN EXAM (GENERAL) (ROUTINE) W/O ABN FINDINGS: Status: ACTIVE | Noted: 2019-08-28

## 2019-08-28 PROCEDURE — 99396 PREV VISIT EST AGE 40-64: CPT | Performed by: NURSE PRACTITIONER

## 2019-08-28 RX ORDER — ESTRADIOL 0.1 MG/G
CREAM VAGINAL
Qty: 42.5 G | Refills: 1 | Status: SHIPPED | OUTPATIENT
Start: 2019-08-28 | End: 2019-09-03 | Stop reason: ALTCHOICE

## 2019-08-28 NOTE — PATIENT INSTRUCTIONS
Calcium 1200-1500mg + 600-1000 IU Vit D daily  Annual mammogram Colonoscopy-up to date  Monthly BSE  Exercise 150 minutes per week minimum  Kegels 20 times twice daily  Silicone based lubricant with sex  Estrace use as directed  Consider use of penile support to avoid deep thrusting with sex (ohnut)     Smoking cessation encouraged

## 2019-09-03 ENCOUNTER — TELEPHONE (OUTPATIENT)
Dept: GYNECOLOGY | Facility: CLINIC | Age: 54
End: 2019-09-03

## 2019-09-03 DIAGNOSIS — N95.2 VAGINAL ATROPHY: Primary | ICD-10-CM

## 2019-09-03 NOTE — TELEPHONE ENCOUNTER
Pt called stating she would like a new rx sent in for premarin instead of estrace because she would have a lesser copay than with estrace  I tried to call it in but they want a new rx

## 2019-10-08 DIAGNOSIS — Z12.11 SCREEN FOR COLON CANCER: Primary | ICD-10-CM

## 2019-10-08 DIAGNOSIS — Z23 NEED FOR INFLUENZA VACCINATION: ICD-10-CM

## 2019-10-09 ENCOUNTER — DOCUMENTATION (OUTPATIENT)
Dept: FAMILY MEDICINE CLINIC | Facility: CLINIC | Age: 54
End: 2019-10-09

## 2019-10-09 ENCOUNTER — OFFICE VISIT (OUTPATIENT)
Dept: FAMILY MEDICINE CLINIC | Facility: CLINIC | Age: 54
End: 2019-10-09
Payer: COMMERCIAL

## 2019-10-09 VITALS
TEMPERATURE: 98.2 F | DIASTOLIC BLOOD PRESSURE: 74 MMHG | OXYGEN SATURATION: 97 % | RESPIRATION RATE: 18 BRPM | BODY MASS INDEX: 19.77 KG/M2 | HEIGHT: 58 IN | HEART RATE: 75 BPM | WEIGHT: 94.2 LBS | SYSTOLIC BLOOD PRESSURE: 104 MMHG

## 2019-10-09 DIAGNOSIS — F32.89 OTHER DEPRESSION: Primary | ICD-10-CM

## 2019-10-09 DIAGNOSIS — R42 DIZZINESS AND GIDDINESS: ICD-10-CM

## 2019-10-09 DIAGNOSIS — F41.9 ANXIETY: ICD-10-CM

## 2019-10-09 PROBLEM — IMO0002 EPICONDYLITIS: Status: ACTIVE | Noted: 2017-07-28

## 2019-10-09 LAB — TSH SERPL DL<=0.05 MIU/L-ACNC: 1.03 UIU/ML (ref 0.36–3.74)

## 2019-10-09 PROCEDURE — 99213 OFFICE O/P EST LOW 20 MIN: CPT | Performed by: NURSE PRACTITIONER

## 2019-10-09 PROCEDURE — 3008F BODY MASS INDEX DOCD: CPT | Performed by: NURSE PRACTITIONER

## 2019-10-09 PROCEDURE — 36415 COLL VENOUS BLD VENIPUNCTURE: CPT | Performed by: NURSE PRACTITIONER

## 2019-10-09 PROCEDURE — 84443 ASSAY THYROID STIM HORMONE: CPT | Performed by: NURSE PRACTITIONER

## 2019-10-09 RX ORDER — FLUOXETINE 10 MG/1
10 TABLET, FILM COATED ORAL DAILY
Qty: 30 TABLET | Refills: 5 | Status: SHIPPED | OUTPATIENT
Start: 2019-10-09 | End: 2020-04-02 | Stop reason: SINTOL

## 2019-10-09 RX ORDER — HYDROXYZINE HYDROCHLORIDE 10 MG/1
10 TABLET, FILM COATED ORAL 2 TIMES DAILY PRN
Qty: 30 TABLET | Refills: 0 | Status: SHIPPED | OUTPATIENT
Start: 2019-10-09 | End: 2020-02-26 | Stop reason: HOSPADM

## 2019-10-09 RX ORDER — SERTRALINE HYDROCHLORIDE 25 MG/1
1 TABLET, FILM COATED ORAL DAILY
COMMUNITY
Start: 2019-09-27 | End: 2019-10-09 | Stop reason: SINTOL

## 2019-10-09 NOTE — PROGRESS NOTES
Assessment/Plan:         Diagnoses and all orders for this visit:    Other depression  -     FLUoxetine (PROzac) 10 MG tablet; Take 1 tablet (10 mg total) by mouth daily  Discussed counseling and said she would consider   Anxiety  -     FLUoxetine (PROzac) 10 MG tablet; Take 1 tablet (10 mg total) by mouth daily  -     hydrOXYzine HCL (ATARAX) 10 mg tablet; Take 1 tablet (10 mg total) by mouth 2 (two) times a day as needed for anxiety May take 2 tabs at once if needed  -     TSH, 3rd generation with Free T4 reflex    Dizziness and giddiness  FMLA filled out   Other orders  -     Discontinue: sertraline (ZOLOFT) 25 mg tablet; Take 1 tablet by mouth daily          Subjective:      Patient ID: Cheryl Mittal is a 46 y o  female  HPI    Started back in 2012 with vertigo    Had treatment with meds and PT and eply exercises    Was getting every other month for 5 days   Over past few months monthly but lasting 2 days  Getting in trouble at work   Exercises made it worse  Recently having depression and was given zoloft cause needing xanax more  Zoloft  Makes her tired  States has been mean  Xanax makes her mad   Zoloft did help depression  The following portions of the patient's history were reviewed and updated as appropriate: allergies, current medications, past family history, past medical history, past social history, past surgical history and problem list     Review of Systems   Constitutional: Negative for activity change, appetite change and fatigue  HENT: Negative for congestion, sinus pressure and sneezing  Respiratory: Negative for shortness of breath and wheezing  Cardiovascular: Negative for chest pain  Gastrointestinal: Positive for nausea  Genitourinary: Negative for dysuria and frequency  Musculoskeletal: Negative for back pain  Neurological: Positive for dizziness and light-headedness  Negative for headaches           Objective:  Vitals:    10/09/19 1301   BP: 104/74   BP Location: Left arm   Patient Position: Sitting   Cuff Size: Standard   Pulse: 75   Resp: 18   Temp: 98 2 °F (36 8 °C)   TempSrc: Temporal   SpO2: 97%   Weight: 42 7 kg (94 lb 3 2 oz)   Height: 4' 10" (1 473 m)      Physical Exam   Constitutional: She is oriented to person, place, and time  She appears well-developed and well-nourished  HENT:   Right Ear: External ear normal    Left Ear: External ear normal    Nose: Nose normal    Mouth/Throat: Oropharynx is clear and moist    Eyes: Conjunctivae are normal    Neck: Neck supple  Cardiovascular: Normal rate, regular rhythm and normal heart sounds  Pulmonary/Chest: Effort normal and breath sounds normal    Abdominal: Soft  Bowel sounds are normal    Musculoskeletal: Normal range of motion  Lymphadenopathy:     She has no cervical adenopathy  Neurological: She is alert and oriented to person, place, and time  Skin: Skin is warm  Psychiatric: Her speech is normal and behavior is normal  Judgment and thought content normal  Her mood appears anxious  Cognition and memory are normal  She exhibits a depressed mood  Vitals reviewed

## 2019-10-10 ENCOUNTER — DOCUMENTATION (OUTPATIENT)
Dept: FAMILY MEDICINE CLINIC | Facility: CLINIC | Age: 54
End: 2019-10-10

## 2019-10-23 ENCOUNTER — TELEPHONE (OUTPATIENT)
Dept: FAMILY MEDICINE CLINIC | Facility: CLINIC | Age: 54
End: 2019-10-23

## 2020-01-24 DIAGNOSIS — K21.9 GASTROESOPHAGEAL REFLUX DISEASE, ESOPHAGITIS PRESENCE NOT SPECIFIED: Primary | ICD-10-CM

## 2020-01-24 RX ORDER — PANTOPRAZOLE SODIUM 40 MG/1
40 TABLET, DELAYED RELEASE ORAL DAILY
Qty: 60 TABLET | Refills: 1 | Status: SHIPPED | OUTPATIENT
Start: 2020-01-24 | End: 2020-07-21 | Stop reason: SDUPTHER

## 2020-02-04 ENCOUNTER — APPOINTMENT (EMERGENCY)
Dept: RADIOLOGY | Facility: HOSPITAL | Age: 55
End: 2020-02-04
Payer: COMMERCIAL

## 2020-02-04 ENCOUNTER — HOSPITAL ENCOUNTER (EMERGENCY)
Facility: HOSPITAL | Age: 55
Discharge: HOME | End: 2020-02-04
Attending: EMERGENCY MEDICINE | Admitting: EMERGENCY MEDICINE
Payer: COMMERCIAL

## 2020-02-04 VITALS
HEART RATE: 85 BPM | DIASTOLIC BLOOD PRESSURE: 87 MMHG | OXYGEN SATURATION: 98 % | BODY MASS INDEX: 19.29 KG/M2 | SYSTOLIC BLOOD PRESSURE: 113 MMHG | WEIGHT: 95.7 LBS | RESPIRATION RATE: 20 BRPM | TEMPERATURE: 99.1 F | HEIGHT: 59 IN

## 2020-02-04 DIAGNOSIS — J11.1 INFLUENZA: ICD-10-CM

## 2020-02-04 DIAGNOSIS — R50.9 FEVER: Primary | ICD-10-CM

## 2020-02-04 LAB
ANION GAP SERPL CALCULATED.3IONS-SCNC: 10 MMOL/L (ref 5–14)
BASOPHILS # BLD AUTO: 0 THOUSANDS/ΜL (ref 0–0.1)
BASOPHILS NFR BLD AUTO: 0 % (ref 0–1)
BUN SERPL-MCNC: 16 MG/DL (ref 5–25)
CALCIUM SERPL-MCNC: 9.5 MG/DL (ref 8.4–10.2)
CHLORIDE SERPL-SCNC: 101 MMOL/L (ref 97–108)
CO2 SERPL-SCNC: 26 MMOL/L (ref 22–30)
CREAT SERPL-MCNC: 0.65 MG/DL (ref 0.6–1.2)
EOSINOPHIL # BLD AUTO: 0 THOUSAND/ΜL (ref 0–0.4)
EOSINOPHIL NFR BLD AUTO: 0 % (ref 0–6)
ERYTHROCYTE [DISTWIDTH] IN BLOOD BY AUTOMATED COUNT: 14 %
FLUAV RNA NPH QL NAA+PROBE: ABNORMAL
FLUBV RNA NPH QL NAA+PROBE: DETECTED
GFR SERPL CREATININE-BSD FRML MDRD: 100 ML/MIN/1.73SQ M
GLUCOSE SERPL-MCNC: 85 MG/DL (ref 70–99)
HCT VFR BLD AUTO: 43.4 % (ref 36–46)
HGB BLD-MCNC: 14.4 G/DL (ref 12–16)
LYMPHOCYTES # BLD AUTO: 1 THOUSANDS/ΜL (ref 0.5–4)
LYMPHOCYTES NFR BLD AUTO: 17 % (ref 25–45)
MCH RBC QN AUTO: 30 PG (ref 26–34)
MCHC RBC AUTO-ENTMCNC: 33.1 G/DL (ref 31–36)
MCV RBC AUTO: 91 FL (ref 80–100)
MONOCYTES # BLD AUTO: 0.5 THOUSAND/ΜL (ref 0.2–0.9)
MONOCYTES NFR BLD AUTO: 8 % (ref 1–10)
NEUTROPHILS # BLD AUTO: 4.4 THOUSANDS/ΜL (ref 1.8–7.8)
NEUTS SEG NFR BLD AUTO: 75 % (ref 45–65)
PLATELET # BLD AUTO: 162 THOUSANDS/UL (ref 150–450)
PMV BLD AUTO: 7.7 FL (ref 8.9–12.7)
POTASSIUM SERPL-SCNC: 4 MMOL/L (ref 3.6–5)
RBC # BLD AUTO: 4.79 MILLION/UL (ref 4–5.2)
RSV RNA NPH QL NAA+PROBE: ABNORMAL
S PYO DNA THROAT QL NAA+PROBE: NORMAL
SODIUM SERPL-SCNC: 137 MMOL/L (ref 137–147)
WBC # BLD AUTO: 5.9 THOUSAND/UL (ref 4.5–11)

## 2020-02-04 PROCEDURE — 85025 COMPLETE CBC W/AUTO DIFF WBC: CPT | Performed by: EMERGENCY MEDICINE

## 2020-02-04 PROCEDURE — 99285 EMERGENCY DEPT VISIT HI MDM: CPT | Performed by: EMERGENCY MEDICINE

## 2020-02-04 PROCEDURE — 87040 BLOOD CULTURE FOR BACTERIA: CPT | Performed by: EMERGENCY MEDICINE

## 2020-02-04 PROCEDURE — 87651 STREP A DNA AMP PROBE: CPT | Performed by: EMERGENCY MEDICINE

## 2020-02-04 PROCEDURE — 96375 TX/PRO/DX INJ NEW DRUG ADDON: CPT

## 2020-02-04 PROCEDURE — 96361 HYDRATE IV INFUSION ADD-ON: CPT

## 2020-02-04 PROCEDURE — 36415 COLL VENOUS BLD VENIPUNCTURE: CPT | Performed by: EMERGENCY MEDICINE

## 2020-02-04 PROCEDURE — 80048 BASIC METABOLIC PNL TOTAL CA: CPT | Performed by: EMERGENCY MEDICINE

## 2020-02-04 PROCEDURE — 71046 X-RAY EXAM CHEST 2 VIEWS: CPT

## 2020-02-04 PROCEDURE — 99284 EMERGENCY DEPT VISIT MOD MDM: CPT

## 2020-02-04 PROCEDURE — 87631 RESP VIRUS 3-5 TARGETS: CPT | Performed by: EMERGENCY MEDICINE

## 2020-02-04 PROCEDURE — 96374 THER/PROPH/DIAG INJ IV PUSH: CPT

## 2020-02-04 RX ORDER — DEXAMETHASONE SODIUM PHOSPHATE 4 MG/ML
8 INJECTION, SOLUTION INTRA-ARTICULAR; INTRALESIONAL; INTRAMUSCULAR; INTRAVENOUS; SOFT TISSUE ONCE
Status: COMPLETED | OUTPATIENT
Start: 2020-02-04 | End: 2020-02-04

## 2020-02-04 RX ORDER — OSELTAMIVIR PHOSPHATE 75 MG/1
75 CAPSULE ORAL 2 TIMES DAILY
Qty: 10 CAPSULE | Refills: 0 | Status: SHIPPED | OUTPATIENT
Start: 2020-02-04 | End: 2020-02-09

## 2020-02-04 RX ORDER — KETOROLAC TROMETHAMINE 30 MG/ML
15 INJECTION, SOLUTION INTRAMUSCULAR; INTRAVENOUS ONCE
Status: COMPLETED | OUTPATIENT
Start: 2020-02-04 | End: 2020-02-04

## 2020-02-04 RX ADMIN — SODIUM CHLORIDE 1000 ML: 0.9 INJECTION, SOLUTION INTRAVENOUS at 18:17

## 2020-02-04 RX ADMIN — DEXAMETHASONE SODIUM PHOSPHATE 8 MG: 4 INJECTION, SOLUTION INTRA-ARTICULAR; INTRALESIONAL; INTRAMUSCULAR; INTRAVENOUS; SOFT TISSUE at 18:17

## 2020-02-04 RX ADMIN — KETOROLAC TROMETHAMINE 15 MG: 30 INJECTION, SOLUTION INTRAMUSCULAR at 18:19

## 2020-02-04 NOTE — ED PROVIDER NOTES
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 Taking?    FLUoxetine (PROzac) 10 MG tablet 2/3/2020 at Unknown time  No Yes   Sig: Take 1 tablet (10 mg total) by mouth daily   conjugated estrogens (PREMARIN) vaginal cream  Self No No   Sig: Insert 0 5 g daily intravaginally x 2 weeks then twice weekly   hydrOXYzine HCL (ATARAX) 10 mg tablet Not Taking at Unknown time  No No   Sig: Take 1 tablet (10 mg total) by mouth 2 (two) times a day as needed for anxiety May take 2 tabs at once if needed   Patient not taking: Reported on 2020   loratadine (CLARITIN) 10 mg tablet  Self Yes No   Sig: Take 10 mg by mouth daily   meclizine (ANTIVERT) 25 mg tablet Not Taking at Unknown time Self Yes No   Si tablet 3 (three) times a day   nicotine polacrilex (NICORETTE) 2 mg gum  Self No No   Sig: Chew 1 each (2 mg total) 3 (three) times a day as needed for smoking cessation   pantoprazole (PROTONIX) 40 mg tablet 2/3/2020 at Unknown time  No Yes   Sig: Take 1 tablet (40 mg total) by mouth daily      Facility-Administered Medications: None       Past Medical History:   Diagnosis Date    Anxiety     Cervical radiculopathy     Cervical spinal stenosis     GERD (gastroesophageal reflux disease)     Myofascial pain syndrome     Polyarthralgia     TIA (transient ischemic attack)        Past Surgical History:   Procedure Laterality Date    CARPAL TUNNEL RELEASE       SECTION      CHOLECYSTECTOMY      ECTOPIC PREGNANCY SURGERY      HYSTERECTOMY      Total unilateral removal tube and ovary    LAPAROSCOPY      OOPHORECTOMY      has one ovary    PUBOVAGINAL SLING      REPAIR RECTOCELE      TONSILECTOMY AND ADNOIDECTOMY         Family History   Problem Relation Age of Onset    Asthma Mother     Hypertension Father     Ulcers Brother     HIV Brother     Thyroid disease Other     Stroke Other     Hepatitis Sister         C and B    No Known Problems Daughter     No Known Problems Son     Other Maternal Grandmother         pre-diabetic    Diabetes Paternal Grandfather     Colon cancer Family      I have reviewed and agree with the history as documented  Social History     Tobacco Use    Smoking status: Current Some Day Smoker    Smokeless tobacco: Never Used    Tobacco comment: 10 cigarettes a day   Substance Use Topics    Alcohol use: No    Drug use: No        Review of Systems   Constitutional: Negative  HENT: Positive for congestion and sore throat  Eyes: Negative  Respiratory: Positive for cough  Cardiovascular: Negative  Gastrointestinal: Negative  Endocrine: Negative  Genitourinary: Negative  Musculoskeletal: Negative  Skin: Negative  Allergic/Immunologic: Negative  Neurological: Negative  Hematological: Negative  Psychiatric/Behavioral: Negative  Physical Exam  Physical Exam   Constitutional: She is oriented to person, place, and time  She appears well-developed and well-nourished  HENT:   Head: Normocephalic and atraumatic     Right Ear: External ear normal    Left Ear: External ear normal    Nose: Nose normal    Mouth/Throat: Oropharynx is clear and moist    Eyes: Pupils are equal, round, and reactive to light  Conjunctivae and EOM are normal    Neck: Normal range of motion  Neck supple  Cardiovascular: Normal rate, regular rhythm, normal heart sounds and intact distal pulses  Pulmonary/Chest: Effort normal and breath sounds normal    Abdominal: Soft  Bowel sounds are normal    Genitourinary:   Genitourinary Comments: Exam deferred  Musculoskeletal: Normal range of motion  Neurological: She is alert and oriented to person, place, and time  Skin: Skin is warm and dry  Capillary refill takes less than 2 seconds  Psychiatric: She has a normal mood and affect  Her behavior is normal    Nursing note and vitals reviewed        Vital Signs  ED Triage Vitals [02/04/20 1755]   Temperature Pulse Respirations Blood Pressure SpO2   99 1 °F (37 3 °C) (!) 119 20 113/87 97 %      Temp Source Heart Rate Source Patient Position - Orthostatic VS BP Location FiO2 (%)   Tympanic Monitor Sitting Left arm --      Pain Score       7           Vitals:    02/04/20 1755 02/04/20 1824   BP: 113/87    Pulse: (!) 119 85   Patient Position - Orthostatic VS: Sitting          Visual Acuity      ED Medications  Medications   sodium chloride 0 9 % bolus 1,000 mL (0 mL Intravenous Stopped 2/4/20 1917)   ketorolac (TORADOL) injection 15 mg (15 mg Intravenous Given 2/4/20 1819)   dexamethasone (DECADRON) injection 8 mg (8 mg Intravenous Given 2/4/20 1817)       Diagnostic Studies  Results Reviewed     Procedure Component Value Units Date/Time    Influenza A/B and RSV PCR [[id number]]  (Abnormal) Collected:  02/04/20 1805    Lab Status:  Final result Specimen:  Nasopharyngeal Swab Updated:  02/04/20 1846     INFLUENZA A PCR None Detected     INFLUENZA B PCR Detected     RSV PCR None Detected    Strep A PCR [[id number]]  (Normal) Collected:  02/04/20 1805    Lab Status:  Final result Specimen:  Throat Updated:  02/04/20 1837     STREP A PCR None Detected    Basic metabolic panel [[id number]]  (Normal) Collected:  02/04/20 1807    Lab Status:  Final result Specimen:  Blood from Hand, Right Updated:  02/04/20 1831     Sodium 137 mmol/L      Potassium 4 0 mmol/L      Chloride 101 mmol/L      CO2 26 mmol/L      ANION GAP 10 mmol/L      BUN 16 mg/dL      Creatinine 0 65 mg/dL      Glucose 85 mg/dL      Calcium 9 5 mg/dL      eGFR 100 ml/min/1 73sq m     Narrative:       National Kidney Disease Foundation guidelines for Chronic Kidney Disease (CKD):     Stage 1 with normal or high GFR (GFR > 90 mL/min/1 73 square meters)    Stage 2 Mild CKD (GFR = 60-89 mL/min/1 73 square meters)    Stage 3A Moderate CKD (GFR = 45-59 mL/min/1 73 square meters)    Stage 3B Moderate CKD (GFR = 30-44 mL/min/1 73 square meters)    Stage 4 Severe CKD (GFR = 15-29 mL/min/1 73 square meters)    Stage 5 End Stage CKD (GFR <15 mL/min/1 73 square meters)  Note: GFR calculation is accurate only with a steady state creatinine    CBC and differential [[id number]]  (Abnormal) Collected:  02/04/20 1807    Lab Status:  Final result Specimen:  Blood from Hand, Right Updated:  02/04/20 1820     WBC 5 90 Thousand/uL      RBC 4 79 Million/uL      Hemoglobin 14 4 g/dL      Hematocrit 43 4 %      MCV 91 fL      MCH 30 0 pg      MCHC 33 1 g/dL      RDW 14 0 %      MPV 7 7 fL      Platelets 085 Thousands/uL      Neutrophils Relative 75 %      Lymphocytes Relative 17 %      Monocytes Relative 8 %      Eosinophils Relative 0 %      Basophils Relative 0 %      Neutrophils Absolute 4 40 Thousands/µL      Lymphocytes Absolute 1 00 Thousands/µL      Monocytes Absolute 0 50 Thousand/µL      Eosinophils Absolute 0 00 Thousand/µL      Basophils Absolute 0 00 Thousands/µL     Blood culture #2 [[id number]] Collected:  02/04/20 1812    Lab Status: In process Specimen:  Blood from Arm, Left Updated:  02/04/20 1816    Blood culture #1 [[id number]] Collected:  02/04/20 1807    Lab Status:   In process Specimen:  Blood from Hand, Right Updated:  02/04/20 1816                 XR chest 2 views ED Interpretation by Stefano Gupta III, DO (02/04 6507)   No acute dx or infiltrate  Procedures  Procedures         ED Course                               MDM  Number of Diagnoses or Management Options  Fever:   Influenza:   Diagnosis management comments: This is a 50-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 congestion sneezing sore throat fever chills and a cough  Patient took Motrin prior to coming to the emergency department  Chest x-ray negative for pneumonia  Flu positive RSV negative strep negative baseline labs unremarkable  Patient was given IV fluid hydration along with Toradol and Decadron for pain control drums the sore throat and muscle aches  Prescription for Tamiflu was administered to the patient will along with a work note  DIFFERENTIAL DIAGNOSIS IN THIS PATIENT INITIALLY WAS AS FOLLOWS strep pharyngitis versus viral pharyngitis versus bronchitis versus pneumonia versus influenza like illness  Portions of the record may have been created with voice recognition software  Occasional wrong word or "sound a like" substitutions may have occurred due to the inherent limitations of voice recognition software  Read the chart carefully and recognize, using context, where substitutions have occurred  Counseling: I had a detailed discussion with the patient and/or guardian regarding: the historical points, exam findings, and any diagnostic results supporting the discharge diagnosis, lab results, radiology results, discharge instructions reviewed with patient and/or family/caregiver and understanding was verbalized   Instructions given to return to the emergency department if symptoms worsen or persist, or if there are any questions or concerns that arise at home         Amount and/or Complexity of Data Reviewed  Clinical lab tests: ordered and reviewed  Tests in the radiology section of CPT®: ordered and reviewed  Tests in the medicine section of CPT®: ordered and reviewed          Disposition  Final diagnoses:   Fever   Influenza     Time reflects when diagnosis was documented in both MDM as applicable and the Disposition within this note     Time User Action Codes Description Comment    2/4/2020  6:59 PM Irasema Solorzano [R50 9] Fever     2/4/2020  6:59 PM Paty Torres [J11 1] Influenza       ED Disposition     ED Disposition Condition Date/Time Comment    Discharge Stable Tue Feb 4, 2020  6:59 PM Haresh Vega discharge to home/self care              Follow-up Information     Follow up With Specialties Details Why R Pramod Mcintosh 69, TH[facility] Nurse Practitioner   [address]  [phone]            Discharge Medication List as of 2/4/2020  7:00 PM      START taking these medications    Details   oseltamivir (TAMIFLU) 75 mg capsule Take 1 capsule (75 mg total) by mouth 2 (two) times a day for 5 days, Starting Tue 2/4/2020, Until Sun 2/9/2020, Print         CONTINUE these medications which have NOT CHANGED    Details   FLUoxetine (PROzac) 10 MG tablet Take 1 tablet (10 mg total) by mouth daily, Starting Wed 10/9/2019, Normal      pantoprazole (PROTONIX) 40 mg tablet Take 1 tablet (40 mg total) by mouth daily, Starting Fri 1/24/2020, Normal      conjugated estrogens (PREMARIN) vaginal cream Insert 0 5 g daily intravaginally x 2 weeks then twice weekly, Normal      hydrOXYzine HCL (ATARAX) 10 mg tablet Take 1 tablet (10 mg total) by mouth 2 (two) times a day as needed for anxiety May take 2 tabs at once if needed, Starting Wed 10/9/2019, Normal      loratadine (CLARITIN) 10 mg tablet Take 10 mg by mouth daily, Historical Med      meclizine (ANTIVERT) 25 mg tablet 1 tablet 3 (three) times a day, Starting Tue 2/14/2017, Historical Med      nicotine polacrilex (NICORETTE) 2 mg gum Chew 1 each (2 mg total) 3 (three) times a day as needed for smoking cessation, Starting Mon 1/14/2019, Normal No discharge procedures on file      ED Provider  Electronically Signed by           Maricarmen Torres III, DO  02/04/20 1247

## 2020-02-10 LAB
BACTERIA BLD CULT: NORMAL
BACTERIA BLD CULT: NORMAL

## 2020-02-18 ENCOUNTER — HOSPITAL ENCOUNTER (OUTPATIENT)
Dept: MRI IMAGING | Facility: HOSPITAL | Age: 55
Discharge: HOME | End: 2020-02-18
Payer: COMMERCIAL

## 2020-02-18 ENCOUNTER — APPOINTMENT (OUTPATIENT)
Dept: LAB | Facility: HOSPITAL | Age: 55
End: 2020-02-18
Attending: PLASTIC SURGERY
Payer: COMMERCIAL

## 2020-02-18 ENCOUNTER — TRANSCRIBE ORDERS (OUTPATIENT)
Dept: ADMINISTRATIVE | Facility: HOSPITAL | Age: 55
End: 2020-02-18

## 2020-02-18 DIAGNOSIS — R42 VERTIGO: ICD-10-CM

## 2020-02-18 DIAGNOSIS — Z01.812 PRE-OPERATIVE LABORATORY EXAMINATION: ICD-10-CM

## 2020-02-18 DIAGNOSIS — Z01.812 PRE-OPERATIVE LABORATORY EXAMINATION: Primary | ICD-10-CM

## 2020-02-18 DIAGNOSIS — H93.13 TINNITUS, BILATERAL: ICD-10-CM

## 2020-02-18 LAB
APTT PPP: 30 SECONDS (ref 25–32)
BASOPHILS # BLD AUTO: 0 THOUSANDS/ΜL (ref 0–0.1)
BASOPHILS NFR BLD AUTO: 0 % (ref 0–1)
EOSINOPHIL # BLD AUTO: 0 THOUSAND/ΜL (ref 0–0.4)
EOSINOPHIL NFR BLD AUTO: 0 % (ref 0–6)
ERYTHROCYTE [DISTWIDTH] IN BLOOD BY AUTOMATED COUNT: 13.8 %
HCT VFR BLD AUTO: 41.6 % (ref 36–46)
HGB BLD-MCNC: 13.7 G/DL (ref 12–16)
INR PPP: 1.07 (ref 0.91–1.09)
LYMPHOCYTES # BLD AUTO: 2.2 THOUSANDS/ΜL (ref 0.5–4)
LYMPHOCYTES NFR BLD AUTO: 19 % (ref 25–45)
MCH RBC QN AUTO: 30.2 PG (ref 26–34)
MCHC RBC AUTO-ENTMCNC: 33 G/DL (ref 31–36)
MCV RBC AUTO: 91 FL (ref 80–100)
MONOCYTES # BLD AUTO: 0.7 THOUSAND/ΜL (ref 0.2–0.9)
MONOCYTES NFR BLD AUTO: 6 % (ref 1–10)
NEUTROPHILS # BLD AUTO: 8.9 THOUSANDS/ΜL (ref 1.8–7.8)
NEUTS SEG NFR BLD AUTO: 75 % (ref 45–65)
PLATELET # BLD AUTO: 298 THOUSANDS/UL (ref 150–450)
PMV BLD AUTO: 8.6 FL (ref 8.9–12.7)
PROTHROMBIN TIME: 11.8 SECONDS (ref 9.8–12)
RBC # BLD AUTO: 4.55 MILLION/UL (ref 4–5.2)
WBC # BLD AUTO: 11.9 THOUSAND/UL (ref 4.5–11)

## 2020-02-18 PROCEDURE — 85730 THROMBOPLASTIN TIME PARTIAL: CPT

## 2020-02-18 PROCEDURE — 85610 PROTHROMBIN TIME: CPT

## 2020-02-18 PROCEDURE — A9585 GADOBUTROL INJECTION: HCPCS | Performed by: PHYSICIAN ASSISTANT

## 2020-02-18 PROCEDURE — 85025 COMPLETE CBC W/AUTO DIFF WBC: CPT

## 2020-02-18 PROCEDURE — 36415 COLL VENOUS BLD VENIPUNCTURE: CPT

## 2020-02-18 PROCEDURE — 70553 MRI BRAIN STEM W/O & W/DYE: CPT

## 2020-02-18 RX ADMIN — GADOBUTROL 4 ML: 604.72 INJECTION INTRAVENOUS at 15:11

## 2020-02-21 NOTE — PRE-PROCEDURE INSTRUCTIONS
Pre-Surgery Instructions:   Medication Instructions    FLUoxetine (PROzac) 10 MG tablet Instructed patient per Anesthesia Guidelines   fluticasone (FLONASE) 50 mcg/act nasal spray Instructed patient per Anesthesia Guidelines   loratadine (CLARITIN) 10 mg tablet Instructed patient per Anesthesia Guidelines   pantoprazole (PROTONIX) 40 mg tablet Instructed patient per Anesthesia Guidelines

## 2020-02-24 NOTE — H&P
H&P Exam - Baron Paredes 53 y o  female MRN: [id number]    Unit/Bed#:  Encounter: [id number]    Assessment:  Bilateral lower eyelid dermatochalasis    Plan:  Bilateral lower eyelid blepharoplasty    History of Present Illness   This is a 58-year-old female with excess skin with herniation of the fat bands of bilateral lower eyelids    Review of Systems   All other systems reviewed and are negative        Historical Information   Past Medical History:   Diagnosis Date    Anxiety     Cervical radiculopathy     Cervical spinal stenosis     Depression     GERD (gastroesophageal reflux disease)     Myofascial pain syndrome     Polyarthralgia     TIA (transient ischemic attack)     Vertigo      Past Surgical History:   Procedure Laterality Date    CARPAL TUNNEL RELEASE       SECTION      CHOLECYSTECTOMY      ECTOPIC PREGNANCY SURGERY      HYSTERECTOMY      Total unilateral removal tube and ovary    LAPAROSCOPY      OOPHORECTOMY      has one ovary    PUBOVAGINAL SLING      REPAIR RECTOCELE      TONSILECTOMY AND ADNOIDECTOMY       Social History   Social History     Substance and Sexual Activity   Alcohol Use No     Social History     Substance and Sexual Activity   Drug Use No     Social History     Tobacco Use   Smoking Status Current Some Day Smoker   Smokeless Tobacco Never Used   Tobacco Comment    10 cigarettes a day        E-Cigarette/Vaping Substances       Family History: non-contributory    Meds/Allergies   all medications and allergies reviewed  Allergies   Allergen Reactions    Gabapentin Hives    Escitalopram     Latex     Sertraline GI Intolerance    Varenicline GI Intolerance       Objective   First Vitals:        Current Vitals:        No intake or output data in the 24 hours ending 20 1755    Invasive Devices     None                 Physical Exam examination of the head ears eyes nose and throat is within normal limits heart sounds S1-S2 heard murmurs or gallops lungs clear abdomen soft extremities are within normal limits    Lab Results:   Imaging:   EKG, Pathology, and Other Studies:     Code Status: No Order  Advance Directive and Living Will:      Power of :    POLST:      Counseling / Coordination of Care:   None

## 2020-02-25 ENCOUNTER — ANESTHESIA EVENT (OUTPATIENT)
Dept: PERIOP | Facility: HOSPITAL | Age: 55
End: 2020-02-25
Payer: SELF-PAY

## 2020-02-25 NOTE — ANESTHESIA PREPROCEDURE EVALUATION
Review of Systems/Medical History  Patient summary reviewed  Chart reviewed      Cardiovascular  EKG reviewed, Exercise tolerance (METS): >4,     Pulmonary  Smoker cigarette smoker  , Tobacco cessation counseling given Cumulative Pack Years: 10,        GI/Hepatic    GERD well controlled,        Negative  ROS        Endo/Other  Negative endo/other ROS      GYN       Hematology  Negative hematology ROS      Musculoskeletal  Negative musculoskeletal ROS        Neurology    TIA (2016 - does not tolerate aspirin),    Psychology   Anxiety, Depression ,              Physical Exam    Airway    Mallampati score: II  TM Distance: >3 FB  Neck ROM: full     Dental       Cardiovascular  Cardiovascular exam normal    Pulmonary  Pulmonary exam normal     Other Findings        Anesthesia Plan  ASA Score- 2     Anesthesia Type- IV sedation with anesthesia with ASA Monitors  Additional Monitors:   Airway Plan:         Plan Factors-  Patient did not smoke on day of surgery  Induction-     Postoperative Plan-     Informed Consent- Anesthetic plan and risks discussed with patient  I personally reviewed this patient with the CRNA  Discussed and agreed on the Anesthesia Plan with the CRNA  Zia Montiel

## 2020-02-26 ENCOUNTER — HOSPITAL ENCOUNTER (OUTPATIENT)
Facility: HOSPITAL | Age: 55
Setting detail: OUTPATIENT SURGERY
Discharge: HOME | End: 2020-02-26
Attending: PLASTIC SURGERY | Admitting: PLASTIC SURGERY
Payer: SELF-PAY

## 2020-02-26 ENCOUNTER — ANESTHESIA (OUTPATIENT)
Dept: PERIOP | Facility: HOSPITAL | Age: 55
End: 2020-02-26
Payer: SELF-PAY

## 2020-02-26 VITALS
DIASTOLIC BLOOD PRESSURE: 62 MMHG | BODY MASS INDEX: 19.15 KG/M2 | HEART RATE: 68 BPM | TEMPERATURE: 96.6 F | WEIGHT: 95 LBS | SYSTOLIC BLOOD PRESSURE: 104 MMHG | RESPIRATION RATE: 20 BRPM | HEIGHT: 59 IN | OXYGEN SATURATION: 96 %

## 2020-02-26 PROBLEM — Z41.1 ENCOUNTER FOR COSMETIC SURGERY: Status: ACTIVE | Noted: 2020-02-26

## 2020-02-26 RX ORDER — SODIUM CHLORIDE, SODIUM LACTATE, POTASSIUM CHLORIDE, CALCIUM CHLORIDE 600; 310; 30; 20 MG/100ML; MG/100ML; MG/100ML; MG/100ML
125 INJECTION, SOLUTION INTRAVENOUS CONTINUOUS
Status: DISCONTINUED | OUTPATIENT
Start: 2020-02-26 | End: 2020-02-26 | Stop reason: HOSPADM

## 2020-02-26 RX ORDER — MAGNESIUM HYDROXIDE 1200 MG/15ML
LIQUID ORAL AS NEEDED
Status: DISCONTINUED | OUTPATIENT
Start: 2020-02-26 | End: 2020-02-26 | Stop reason: HOSPADM

## 2020-02-26 RX ORDER — DEXAMETHASONE SODIUM PHOSPHATE 4 MG/ML
INJECTION, SOLUTION INTRA-ARTICULAR; INTRALESIONAL; INTRAMUSCULAR; INTRAVENOUS; SOFT TISSUE AS NEEDED
Status: DISCONTINUED | OUTPATIENT
Start: 2020-02-26 | End: 2020-02-26 | Stop reason: SURG

## 2020-02-26 RX ORDER — HYDROMORPHONE HCL/PF 1 MG/ML
0.5 SYRINGE (ML) INJECTION
Status: DISCONTINUED | OUTPATIENT
Start: 2020-02-26 | End: 2020-02-26 | Stop reason: HOSPADM

## 2020-02-26 RX ORDER — HYDROXYZINE HYDROCHLORIDE 10 MG/1
10 TABLET, FILM COATED ORAL 2 TIMES DAILY
COMMUNITY
End: 2020-02-26 | Stop reason: HOSPADM

## 2020-02-26 RX ORDER — CEFAZOLIN SODIUM 1 G/50ML
1000 SOLUTION INTRAVENOUS ONCE
Status: COMPLETED | OUTPATIENT
Start: 2020-02-26 | End: 2020-02-26

## 2020-02-26 RX ORDER — ONDANSETRON 2 MG/ML
INJECTION INTRAMUSCULAR; INTRAVENOUS AS NEEDED
Status: DISCONTINUED | OUTPATIENT
Start: 2020-02-26 | End: 2020-02-26 | Stop reason: SURG

## 2020-02-26 RX ORDER — ACETAMINOPHEN 325 MG/1
650 TABLET ORAL EVERY 6 HOURS PRN
Status: DISCONTINUED | OUTPATIENT
Start: 2020-02-26 | End: 2020-02-26 | Stop reason: HOSPADM

## 2020-02-26 RX ORDER — DIPHENHYDRAMINE HYDROCHLORIDE 50 MG/ML
12.5 INJECTION INTRAMUSCULAR; INTRAVENOUS ONCE AS NEEDED
Status: DISCONTINUED | OUTPATIENT
Start: 2020-02-26 | End: 2020-02-26 | Stop reason: HOSPADM

## 2020-02-26 RX ORDER — LIDOCAINE HYDROCHLORIDE 10 MG/ML
INJECTION, SOLUTION EPIDURAL; INFILTRATION; INTRACAUDAL; PERINEURAL AS NEEDED
Status: DISCONTINUED | OUTPATIENT
Start: 2020-02-26 | End: 2020-02-26 | Stop reason: SURG

## 2020-02-26 RX ORDER — MIDAZOLAM HYDROCHLORIDE 2 MG/2ML
INJECTION, SOLUTION INTRAMUSCULAR; INTRAVENOUS AS NEEDED
Status: DISCONTINUED | OUTPATIENT
Start: 2020-02-26 | End: 2020-02-26 | Stop reason: SURG

## 2020-02-26 RX ORDER — EPHEDRINE SULFATE 50 MG/ML
INJECTION INTRAVENOUS AS NEEDED
Status: DISCONTINUED | OUTPATIENT
Start: 2020-02-26 | End: 2020-02-26 | Stop reason: SURG

## 2020-02-26 RX ORDER — FENTANYL CITRATE/PF 50 MCG/ML
25 SYRINGE (ML) INJECTION
Status: DISCONTINUED | OUTPATIENT
Start: 2020-02-26 | End: 2020-02-26 | Stop reason: HOSPADM

## 2020-02-26 RX ORDER — PROPOFOL 10 MG/ML
INJECTION, EMULSION INTRAVENOUS CONTINUOUS PRN
Status: DISCONTINUED | OUTPATIENT
Start: 2020-02-26 | End: 2020-02-26 | Stop reason: SURG

## 2020-02-26 RX ORDER — LIDOCAINE HYDROCHLORIDE AND EPINEPHRINE 5; 5 MG/ML; UG/ML
INJECTION, SOLUTION INFILTRATION; PERINEURAL AS NEEDED
Status: DISCONTINUED | OUTPATIENT
Start: 2020-02-26 | End: 2020-02-26 | Stop reason: HOSPADM

## 2020-02-26 RX ORDER — FENTANYL CITRATE 50 UG/ML
INJECTION, SOLUTION INTRAMUSCULAR; INTRAVENOUS AS NEEDED
Status: DISCONTINUED | OUTPATIENT
Start: 2020-02-26 | End: 2020-02-26 | Stop reason: SURG

## 2020-02-26 RX ADMIN — MIDAZOLAM HYDROCHLORIDE 2 MG: 1 INJECTION, SOLUTION INTRAMUSCULAR; INTRAVENOUS at 07:36

## 2020-02-26 RX ADMIN — EPHEDRINE SULFATE 5 MG: 50 INJECTION, SOLUTION INTRAVENOUS at 08:23

## 2020-02-26 RX ADMIN — FENTANYL CITRATE 50 MCG: 50 INJECTION INTRAMUSCULAR; INTRAVENOUS at 07:56

## 2020-02-26 RX ADMIN — CEFAZOLIN SODIUM 1000 MG: 1 SOLUTION INTRAVENOUS at 07:34

## 2020-02-26 RX ADMIN — SODIUM CHLORIDE, SODIUM LACTATE, POTASSIUM CHLORIDE, AND CALCIUM CHLORIDE: .6; .31; .03; .02 INJECTION, SOLUTION INTRAVENOUS at 07:33

## 2020-02-26 RX ADMIN — FENTANYL CITRATE 25 MCG: 50 INJECTION INTRAMUSCULAR; INTRAVENOUS at 07:41

## 2020-02-26 RX ADMIN — EPHEDRINE SULFATE 5 MG: 50 INJECTION, SOLUTION INTRAVENOUS at 08:01

## 2020-02-26 RX ADMIN — DEXAMETHASONE SODIUM PHOSPHATE 4 MG: 4 INJECTION, SOLUTION INTRA-ARTICULAR; INTRALESIONAL; INTRAMUSCULAR; INTRAVENOUS; SOFT TISSUE at 07:42

## 2020-02-26 RX ADMIN — FENTANYL CITRATE 25 MCG: 50 INJECTION INTRAMUSCULAR; INTRAVENOUS at 09:35

## 2020-02-26 RX ADMIN — ONDANSETRON HYDROCHLORIDE 4 MG: 2 INJECTION, SOLUTION INTRAMUSCULAR; INTRAVENOUS at 07:36

## 2020-02-26 RX ADMIN — FENTANYL CITRATE 25 MCG: 50 INJECTION INTRAMUSCULAR; INTRAVENOUS at 08:39

## 2020-02-26 RX ADMIN — LIDOCAINE HYDROCHLORIDE 30 MG: 10 INJECTION, SOLUTION EPIDURAL; INFILTRATION; INTRACAUDAL; PERINEURAL at 07:36

## 2020-02-26 RX ADMIN — PROPOFOL 40 MCG/KG/MIN: 10 INJECTION, EMULSION INTRAVENOUS at 07:39

## 2020-02-26 NOTE — NURSING NOTE
Ambulated to the bathroom  Voided  Anxious to go home  IV was removed  Discharge instructions given

## 2020-02-26 NOTE — ANESTHESIA POSTPROCEDURE EVALUATION
Post-Op Assessment Note    CV Status:  Stable  Pain Score: 0    Pain management: adequate     Mental Status:  Alert and awake   Hydration Status:  Euvolemic   PONV Controlled:  Controlled   Airway Patency:  Patent   Post Op Vitals Reviewed: Yes      Staff: CRNA           BP   111/59   Temp   97 6   Pulse  74   Resp (P) 16 (02/26/20 0918)    SpO2   95%

## 2020-02-26 NOTE — DISCHARGE INSTRUCTIONS
Blepharoplasty   WHAT YOU NEED TO KNOW:   Blepharoplasty, or eye lift, is surgery to fix a sagging, drooping, or baggy eyelid  The upper and lower eyelids may be fixed  DISCHARGE INSTRUCTIONS:   Medicines:   · Medicines  can help decrease pain and swelling, or prevent an infection  · Take your medicine as directed  Contact your healthcare provider if you think your medicine is not helping or if you have side effects  Tell him or her if you are allergic to any medicine  Keep a list of the medicines, vitamins, and herbs you take  Include the amounts, and when and why you take them  Bring the list or the pill bottles to follow-up visits  Carry your medicine list with you in case of an emergency  Follow up with your healthcare provider as directed: You may need to return to have your eyelid checked or your stitches removed  Write down your questions so you remember to ask them during your visits  Care for your eye:   · Use artificial tears  twice a day if you have dry eye  · Apply ice  on your eye 15 to 20 minutes every hour or as directed  Use an ice pack, or put crushed ice in a plastic bag  Cover it with a towel  Ice helps prevent tissue damage and decreases swelling and pain  · Elevate your head and upper back  when you rest, such as in a recliner  Place extra pillows under your head and neck when you sleep in bed  Elevation will help decrease swelling  · Limit activity as directed  Do not lift objects over 20 pounds  Ask when you can return to your usual daily activities  · Care for your wound as directed  Carefully wash the wound with soap and water  Dry the area and put on new, clean bandages as directed  Change your bandages when they get wet or dirty  · Do not wear contact lenses or eye makeup  until your eye has healed  Wear sunglasses to protect your eye when you are outside  Contact your healthcare provider if:   · Your eye is red, swollen, or draining pus      · You have a rash around your eye  · You have a fever and chills  · You have questions or concerns about your condition or care  Seek care immediately or call 911 if:   · You have chest pain or trouble breathing  · You have vision loss  · Your eye begins to bleed  · You feel sudden, sharp pain in your eye  · Your stitches come apart  © 2017 [employer] Information is for End User's use only and may not be sold, redistributed or otherwise used for commercial purposes  All illustrations and images included in CareNotes® are the copyrighted property of [employer]  or [employer]  The above information is an  only  It is not intended as medical advice for individual conditions or treatments  Talk to your doctor, nurse or pharmacist before following any medical regimen to see if it is safe and effective for you  2-4 times/mo

## 2020-02-26 NOTE — NURSING NOTE
Received from PACU at 1023  Alert and oriented  Denies pain  Sutures intact to lower eye lids  No drainage  Ice to area  HOB elevated  Respirations easy and non labored  IV fluids continue  Eliud galeana in reach

## 2020-02-26 NOTE — OP NOTE
OPERATIVE REPORT  PATIENT NAME: Micky Pollack    :  1965  MRN: [id number]  Pt Location:  OR ROOM 07    SURGERY DATE: 2020    Surgeon(s) and Role:     * Scott Mcmullen MD - Primary    Preop Diagnosis:  Encounter for cosmetic surgery [Z41 1]    Post-Op Diagnosis Codes:     * Encounter for cosmetic surgery [Z41 1]    Procedure(s) (LRB):  BLEPHAROPLASTY LOWER (Bilateral)    Specimen(s):  * No specimens in log *    Estimated Blood Loss:   2 mL    Drains:  * No LDAs found *    Anesthesia Type:   IV Sedation with Anesthesia    Operative Indications:  Encounter for cosmetic surgery [Z41 1]  Bilateral lower eyelid dermatochalasis and herniation of fat pads    Operative Findings:  Normal fat in the medial middle and lateral compartments of lower eyelids    Complications:   None    Procedure and Technique:  Patient was draped and prepped in normal sterile fashion  Marked for a bilateral is sub ciliary lower eyelid incision  Injected with local anesthesia  Incision was made a myocutaneous flap was elevated the fat was then teased out of the compartments of the lower eyelid and hemostasis achieved electrocautery after the fat was removed  The excess skin was removed and a muscle TAB was developed from the orbicularis oculi laterally and anchored to the periosteum of the frontal zygomatic bone    The skin was then closed with a running 6 0 nylon suture identical procedure was performed on both sides patient tolerated the procedure well   I was present for the entire procedure    Patient Disposition:  PACU     SIGNATURE: Scott Mcmullen MD  DATE: 2020  TIME: 9:14 AM

## 2020-02-26 NOTE — INTERVAL H&P NOTE
H&P reviewed  After examining the patient I find no changes in the patients condition since the H&P had been written      Vitals:    02/26/20 0600   BP: 109/60   Pulse: 68   Resp: 20   Temp: 97 8 °F (36 6 °C)   SpO2: 99%

## 2020-03-31 ENCOUNTER — TELEPHONE (OUTPATIENT)
Dept: FAMILY MEDICINE CLINIC | Facility: CLINIC | Age: 55
End: 2020-03-31

## 2020-03-31 NOTE — TELEPHONE ENCOUNTER
If she needs higher dose and this isn't working shows her maintenance med the Fluoxitine is probably not high enough   If that gets bumped up she wouldn't need the other med     That needs a visit and could be virtual

## 2020-03-31 NOTE — TELEPHONE ENCOUNTER
Hydroxyzine  Given in October 2019  Not on med list  Requesting a higher dose because it is not working like xanax did   [facility] pharm  [phone]

## 2020-04-02 ENCOUNTER — TELEMEDICINE (OUTPATIENT)
Dept: FAMILY MEDICINE CLINIC | Facility: CLINIC | Age: 55
End: 2020-04-02
Payer: COMMERCIAL

## 2020-04-02 DIAGNOSIS — F41.9 ANXIETY: Primary | ICD-10-CM

## 2020-04-02 PROCEDURE — 99213 OFFICE O/P EST LOW 20 MIN: CPT | Performed by: NURSE PRACTITIONER

## 2020-04-02 RX ORDER — HYDROXYZINE HYDROCHLORIDE 25 MG/1
25 TABLET, FILM COATED ORAL EVERY 8 HOURS PRN
Qty: 20 TABLET | Refills: 0 | Status: SHIPPED | OUTPATIENT
Start: 2020-04-02 | End: 2020-08-12 | Stop reason: SDUPTHER

## 2020-04-02 RX ORDER — BUSPIRONE HYDROCHLORIDE 5 MG/1
TABLET ORAL
Qty: 60 TABLET | Refills: 5 | Status: SHIPPED | OUTPATIENT
Start: 2020-04-02 | End: 2020-07-21 | Stop reason: SDUPTHER

## 2020-04-17 ENCOUNTER — TELEMEDICINE (OUTPATIENT)
Dept: FAMILY MEDICINE CLINIC | Facility: CLINIC | Age: 55
End: 2020-04-17
Payer: COMMERCIAL

## 2020-04-17 DIAGNOSIS — F41.9 ANXIETY: Primary | ICD-10-CM

## 2020-04-17 PROCEDURE — 99213 OFFICE O/P EST LOW 20 MIN: CPT | Performed by: NURSE PRACTITIONER

## 2020-06-04 ENCOUNTER — TELEPHONE (OUTPATIENT)
Dept: FAMILY MEDICINE CLINIC | Facility: CLINIC | Age: 55
End: 2020-06-04

## 2020-07-10 ENCOUNTER — HOSPITAL ENCOUNTER (OUTPATIENT)
Dept: RADIOLOGY | Age: 55
Discharge: HOME | End: 2020-07-10
Payer: COMMERCIAL

## 2020-07-10 VITALS — HEIGHT: 59 IN | BODY MASS INDEX: 19.76 KG/M2 | WEIGHT: 98 LBS

## 2020-07-10 DIAGNOSIS — Z12.31 ENCOUNTER FOR SCREENING MAMMOGRAM FOR BREAST CANCER: ICD-10-CM

## 2020-07-10 PROCEDURE — 77063 BREAST TOMOSYNTHESIS BI: CPT

## 2020-07-10 PROCEDURE — 77067 SCR MAMMO BI INCL CAD: CPT

## 2020-07-21 ENCOUNTER — TELEMEDICINE (OUTPATIENT)
Dept: FAMILY MEDICINE CLINIC | Facility: CLINIC | Age: 55
End: 2020-07-21
Payer: COMMERCIAL

## 2020-07-21 DIAGNOSIS — K21.9 GASTROESOPHAGEAL REFLUX DISEASE, ESOPHAGITIS PRESENCE NOT SPECIFIED: ICD-10-CM

## 2020-07-21 DIAGNOSIS — H81.10 BENIGN PAROXYSMAL POSITIONAL VERTIGO, UNSPECIFIED LATERALITY: Primary | ICD-10-CM

## 2020-07-21 DIAGNOSIS — F41.9 ANXIETY: ICD-10-CM

## 2020-07-21 PROCEDURE — 4004F PT TOBACCO SCREEN RCVD TLK: CPT | Performed by: NURSE PRACTITIONER

## 2020-07-21 PROCEDURE — 99213 OFFICE O/P EST LOW 20 MIN: CPT | Performed by: NURSE PRACTITIONER

## 2020-07-21 RX ORDER — PANTOPRAZOLE SODIUM 40 MG/1
40 TABLET, DELAYED RELEASE ORAL DAILY
Qty: 90 TABLET | Refills: 3 | Status: SHIPPED | OUTPATIENT
Start: 2020-07-21 | End: 2021-09-30 | Stop reason: SDUPTHER

## 2020-07-21 RX ORDER — BUSPIRONE HYDROCHLORIDE 5 MG/1
TABLET ORAL
Qty: 60 TABLET | Refills: 5
Start: 2020-07-21

## 2020-07-21 NOTE — PROGRESS NOTES
Virtual Regular Visit      Assessment/Plan:    Problem List Items Addressed This Visit        Digestive    Gastroesophageal reflux disease    Relevant Medications    pantoprazole (PROTONIX) 40 mg tablet       Nervous and Auditory    Benign paroxysmal positional vertigo - Primary       Other    Anxiety    Relevant Medications    busPIRone (BUSPAR) 5 mg tablet        Doing better    Physical in next few months        Reason for visit is   Chief Complaint   Patient presents with    Follow-up     vertigo     Virtual Regular Visit        Encounter provider Di Green, [facility]    Provider located at [facility] [place] [address] [id number]      Recent Visits  No visits were found meeting these conditions  Showing recent visits within past 7 days and meeting all other requirements     Today's Visits  Date Type Provider Dept   07/21/20 [facility], [facility]   Showing today's visits and meeting all other requirements     Future Appointments  No visits were found meeting these conditions  Showing future appointments within next 150 days and meeting all other requirements        The patient was identified by name and date of birth  Loyallnilsa Almanza was informed that this is a telemedicine visit and that the visit is being conducted through [facility] and patient was informed that this is a secure, HIPAA-compliant platform  She agrees to proceed     My office door was closed  No one else was in the room  She acknowledged consent and understanding of privacy and security of the video platform  The patient has agreed to participate and understands they can discontinue the visit at any time  Patient is aware this is a billable service  Subjective  Elan Almanza is a 53 y o  female follow up on vertigo had Sunday and now better  In June had it a whole week but then went away   Was worse with turning    Had nausea    wasn't as bad  Had PT for vertigo and didn't help    Saw ENt   But never neurology     Anxiety stable    Doing better  Occasionally will get down but goes away and can get self out of it     HPI     Past Medical History:   Diagnosis Date    Anxiety     Cervical radiculopathy     Cervical spinal stenosis     Depression     GERD (gastroesophageal reflux disease)     Myofascial pain syndrome     Polyarthralgia     TIA (transient ischemic attack)     Vertigo        Past Surgical History:   Procedure Laterality Date    CARPAL TUNNEL RELEASE       SECTION      CHOLECYSTECTOMY      ECTOPIC PREGNANCY SURGERY      HYSTERECTOMY      Total unilateral removal tube and ovary    LAPAROSCOPY      OOPHORECTOMY      has one ovary    MI REVISION OF LOWER EYELID Bilateral 2020    Procedure: BLEPHAROPLASTY LOWER;  Surgeon: Melany Quinn MD;  Location: [address] [place] [address];  Service: Plastics    PUBOVAGINAL SLING      REPAIR RECTOCELE      TONSILECTOMY AND ADNOIDECTOMY         Current Outpatient Medications   Medication Sig Dispense Refill    busPIRone (BUSPAR) 5 mg tablet Currently 1 tab daily   May bump up if needed to twice a day 60 tablet 5    hydrOXYzine HCL (ATARAX) 25 mg tablet Take 1 tablet (25 mg total) by mouth every 8 (eight) hours as needed for itching 20 tablet 0    loratadine (CLARITIN) 10 mg tablet Take 10 mg by mouth daily      meclizine (ANTIVERT) 25 mg tablet 1 tablet 3 (three) times a day      pantoprazole (PROTONIX) 40 mg tablet Take 1 tablet (40 mg total) by mouth daily 90 tablet 3    fluticasone (FLONASE) 50 mcg/act nasal spray 2 sprays into each nostril daily 1 Bottle 3     No current facility-administered medications for this visit           Allergies   Allergen Reactions    Gabapentin Hives    Escitalopram Drowsiness    Fluoxetine      Ringing in ears    Latex     Sertraline GI Intolerance    Varenicline GI Intolerance       Review of Systems Constitutional: Negative for activity change, appetite change, chills, fatigue and unexpected weight change  HENT: Negative for congestion and sore throat  Respiratory: Negative for cough and shortness of breath  Cardiovascular: Negative for chest pain  Neurological: Positive for dizziness  Negative for weakness, light-headedness and headaches  Psychiatric/Behavioral: Negative for sleep disturbance  The patient is nervous/anxious  Video Exam    There were no vitals filed for this visit  Physical Exam   Constitutional: She is oriented to person, place, and time  She appears well-developed and well-nourished  Pulmonary/Chest: Effort normal    Neurological: She is alert and oriented to person, place, and time  Psychiatric: Her speech is normal and behavior is normal  Judgment and thought content normal  Her mood appears anxious  Cognition and memory are normal         I spent 15 minutes directly with the patient during this visit      VIRTUAL VISIT DISCLAIMER    John Saeed acknowledges that she has consented to an online visit or consultation  She understands that the online visit is based solely on information provided by her, and that, in the absence of a face-to-face physical evaluation by the physician, the diagnosis she receives is both limited and provisional in terms of accuracy and completeness  This is not intended to replace a full medical face-to-face evaluation by the physician  John Tipton understands and accepts these terms

## 2020-08-10 ENCOUNTER — TELEPHONE (OUTPATIENT)
Dept: FAMILY MEDICINE CLINIC | Facility: CLINIC | Age: 55
End: 2020-08-10

## 2020-08-10 NOTE — TELEPHONE ENCOUNTER
Patient is calling requesting for smoking patches and for the gum patient states that the 14mg is not work please review thank you please rx patch 21mg and 2mg for gum

## 2020-08-12 ENCOUNTER — TELEMEDICINE (OUTPATIENT)
Dept: FAMILY MEDICINE CLINIC | Facility: CLINIC | Age: 55
End: 2020-08-12
Payer: COMMERCIAL

## 2020-08-12 DIAGNOSIS — F41.9 ANXIETY: ICD-10-CM

## 2020-08-12 DIAGNOSIS — F17.210 CIGARETTE SMOKER MOTIVATED TO QUIT: Primary | ICD-10-CM

## 2020-08-12 PROCEDURE — 4004F PT TOBACCO SCREEN RCVD TLK: CPT | Performed by: NURSE PRACTITIONER

## 2020-08-12 PROCEDURE — 99214 OFFICE O/P EST MOD 30 MIN: CPT | Performed by: NURSE PRACTITIONER

## 2020-08-12 RX ORDER — NICOTINE 21 MG/24HR
1 PATCH, TRANSDERMAL 24 HOURS TRANSDERMAL EVERY 24 HOURS
Qty: 28 PATCH | Refills: 0 | Status: SHIPPED | OUTPATIENT
Start: 2020-08-12

## 2020-08-12 RX ORDER — HYDROXYZINE HYDROCHLORIDE 25 MG/1
25 TABLET, FILM COATED ORAL EVERY 8 HOURS PRN
Qty: 20 TABLET | Refills: 0 | Status: SHIPPED | OUTPATIENT
Start: 2020-08-12 | End: 2021-06-11 | Stop reason: SDUPTHER

## 2020-08-12 NOTE — PROGRESS NOTES
Virtual Regular Visit      Assessment/Plan:    Problem List Items Addressed This Visit        Other    Anxiety    Relevant Medications    hydrOXYzine HCL (ATARAX) 25 mg tablet      Other Visit Diagnoses     Cigarette smoker motivated to quit    -  Primary    Relevant Medications    nicotine (NICODERM CQ) 21 mg/24 hr TD 24 hr patch    nicotine polacrilex (NICORETTE) 2 mg gum               Reason for visit is   Chief Complaint   Patient presents with    Virtual Brief Visit     smoking         Encounter provider THOM Claudio    Provider located at [address] [place] [phone]      Recent Visits  Date Type Provider Dept   08/10/20 Telephone [facility]   Showing recent visits within past 7 days and meeting all other requirements     Today's Visits  Date Type Provider Dept   08/12/20 Telemedicine Brendon Parra, [address]   Showing today's visits and meeting all other requirements     Future Appointments  No visits were found meeting these conditions  Showing future appointments within next 150 days and meeting all other requirements        The patient was identified by name and date of birth  Emily Sierra was informed that this is a telemedicine visit and that the visit is being conducted through [facility] and patient was informed that this is a secure, HIPAA-compliant platform  She agrees to proceed     My office door was closed  No one else was in the room  She acknowledged consent and understanding of privacy and security of the video platform  The patient has agreed to participate and understands they can discontinue the visit at any time  Patient is aware this is a billable service  Subjective  Emily Sierra is a 53 y o  female motiviated to quit smoking  Presents today for discussion about stopping smoking    Approximately a year and a half ago she was put on the Nicoderm patches along with the nicotine gum and was able to quit smoking for 2 weeks  She stopped the patches and has started back smoking again  She smokes approximately a pack a day  She is motivated to quit for good this time and is requesting both that Nicoderm patches and the gum  Discussed with her doing the 21 mg Nicoderm patch for 28 days reducing that to 14 and then to 7 giving her a good time to adjust to not smoking  Discussed with patient that while on the Nicoderm and the gum not to smoke  All questions were asked and answered    She also is requesting a refill on her Atarax for her anxiety as well       Past Medical History:   Diagnosis Date    Anxiety     Cervical radiculopathy     Cervical spinal stenosis     Depression     GERD (gastroesophageal reflux disease)     Myofascial pain syndrome     Polyarthralgia     TIA (transient ischemic attack)     Vertigo        Past Surgical History:   Procedure Laterality Date    CARPAL TUNNEL RELEASE       SECTION      CHOLECYSTECTOMY      ECTOPIC PREGNANCY SURGERY      HYSTERECTOMY      Total unilateral removal tube and ovary    LAPAROSCOPY      OOPHORECTOMY      has one ovary    OR REVISION OF LOWER EYELID Bilateral 2020    Procedure: BLEPHAROPLASTY LOWER;  Surgeon: Janet Werner MD;  Location: [facility] MAIN OR;  Service: Plastics    PUBOVAGINAL SLING      REPAIR RECTOCELE      TONSILECTOMY AND ADNOIDECTOMY         Current Outpatient Medications   Medication Sig Dispense Refill    busPIRone (BUSPAR) 5 mg tablet Currently 1 tab daily   May bump up if needed to twice a day 60 tablet 5    hydrOXYzine HCL (ATARAX) 25 mg tablet Take 1 tablet (25 mg total) by mouth every 8 (eight) hours as needed for itching 20 tablet 0    loratadine (CLARITIN) 10 mg tablet Take 10 mg by mouth daily      meclizine (ANTIVERT) 25 mg tablet 1 tablet 3 (three) times a day      pantoprazole (PROTONIX) 40 mg tablet Take 1 tablet (40 mg total) by mouth daily 90 tablet 3    fluticasone (FLONASE) 50 mcg/act nasal spray 2 sprays into each nostril daily 1 Bottle 3    nicotine (NICODERM CQ) 21 mg/24 hr TD 24 hr patch Place 1 patch on the skin every 24 hours 28 patch 0    nicotine polacrilex (NICORETTE) 2 mg gum Chew 1 each (2 mg total) as needed for smoking cessation 100 each 0     No current facility-administered medications for this visit  Allergies   Allergen Reactions    Gabapentin Hives    Escitalopram Drowsiness    Fluoxetine      Ringing in ears    Latex     Sertraline GI Intolerance    Varenicline GI Intolerance       Review of Systems   Constitutional: Negative for chills, fatigue and fever  Respiratory: Negative for cough and shortness of breath  Cardiovascular: Negative for palpitations  Psychiatric/Behavioral: Negative for dysphoric mood  The patient is not nervous/anxious (being treated)  Video Exam    There were no vitals filed for this visit  Physical Exam  Constitutional:       Appearance: Normal appearance  Pulmonary:      Effort: Pulmonary effort is normal  No respiratory distress  Comments: No dyspnea noted while conversing  Neurological:      Mental Status: She is alert and oriented to person, place, and time  Psychiatric:         Mood and Affect: Mood normal          Behavior: Behavior normal           I spent 15 minutes with patient today in which greater than 50% of the time was spent in counseling/coordination of care regarding medication to quit smoking      VIRTUAL VISIT DISCLAIMER    Siomara Menard acknowledges that she has consented to an online visit or consultation  She understands that the online visit is based solely on information provided by her, and that, in the absence of a face-to-face physical evaluation by the physician, the diagnosis she receives is both limited and provisional in terms of accuracy and completeness   This is not intended to replace a full medical face-to-face evaluation by the physician  Damir Pham understands and accepts these terms

## 2020-09-18 NOTE — PRE-PROCEDURE INSTRUCTIONS
Pre-Surgery Instructions:   Medication Instructions    busPIRone (BUSPAR) 5 mg tablet Instructed patient per Anesthesia Guidelines   hydrOXYzine HCL (ATARAX) 25 mg tablet Instructed patient per Anesthesia Guidelines   loratadine (CLARITIN) 10 mg tablet Instructed patient per Anesthesia Guidelines   meclizine (ANTIVERT) 25 mg tablet Instructed patient per Anesthesia Guidelines   nicotine (NICODERM CQ) 21 mg/24 hr TD 24 hr patch Instructed patient per Anesthesia Guidelines   nicotine polacrilex (NICORETTE) 2 mg gum Instructed patient per Anesthesia Guidelines   pantoprazole (PROTONIX) 40 mg tablet Instructed patient per Anesthesia Guidelines  Buspirone Med Class   Continue to take this medication on your normal schedule  If this is an oral medication and you take it in the morning, then you may take this medicine with a sip of water  Phone assessment with verbal understanding, NPO MN including gum and candy, am meds per protocol,showers X2 with CHG, insurance card and photo ID,  for disch home and preop call from [address] on 9/21/2020

## 2020-09-21 ENCOUNTER — ANESTHESIA EVENT (OUTPATIENT)
Dept: PERIOP | Facility: HOSPITAL | Age: 55
End: 2020-09-21
Payer: COMMERCIAL

## 2020-09-21 NOTE — H&P
H&P Exam - Rolin Stains 53 y o  female MRN: [id number]    Unit/Bed#:  Encounter: [id number]    Assessment:  Ganglion cyst left dorsal wrist    Plan:  Excision of ganglion cyst left dorsal wrist    History of Present Illness   This is a 69-year-old female with a history of a left dorsal ganglion wrist for at least the last 5 years  Patient complains of pain increased in size despite the use of conservative splints  Patient has decreased range of her of motion and full extension    Review of Systems   All other systems reviewed and are negative        Historical Information   Past Medical History:   Diagnosis Date    Anxiety     Cervical radiculopathy     Cervical spinal stenosis     Depression     GERD (gastroesophageal reflux disease)     Hiatal hernia     History of transfusion 1985    Myofascial pain syndrome     Polyarthralgia     TIA (transient ischemic attack)     Vertigo      Past Surgical History:   Procedure Laterality Date    CARPAL TUNNEL RELEASE       SECTION      CHOLECYSTECTOMY      COLONOSCOPY      ECTOPIC PREGNANCY SURGERY      HYSTERECTOMY      Total unilateral removal tube and ovary    LAPAROSCOPY      OOPHORECTOMY      has one ovary    SD REVISION OF LOWER EYELID Bilateral 2020    Procedure: BLEPHAROPLASTY LOWER;  Surgeon: Carlos Mayo MD;  Location: [facility] MAIN OR;  Service: Plastics    PUBOVAGINAL SLING      REPAIR RECTOCELE      TONSILECTOMY AND ADNOIDECTOMY      TONSILLECTOMY       Social History   Social History     Substance and Sexual Activity   Alcohol Use No     Social History     Substance and Sexual Activity   Drug Use Never     Social History     Tobacco Use   Smoking Status Current Some Day Smoker   Smokeless Tobacco Never Used   Tobacco Comment    using patch and gum     E-Cigarette Use: Never User     E-Cigarette/Vaping Substances       Family History: non-contributory    Meds/Allergies   all medications and allergies reviewed  Allergies Allergen Reactions    Gabapentin Hives    Escitalopram Drowsiness    Fluoxetine      Ringing in ears    Latex     Sertraline GI Intolerance    Varenicline GI Intolerance       Objective   First Vitals:        Current Vitals:        No intake or output data in the 24 hours ending 09/21/20 1730    Invasive Devices     None                 Physical Exam examination of the head ears eyes nose throat is within normal limits heart sounds S1-S2 heard murmurs or gallops lungs clear abdomen soft extremities are as above the patient has a 2 cm ganglion cyst of the left dorsal mid wrist     Lab Results:   Imaging:   EKG, Pathology, and Other Studies:     Code Status: No Order  Advance Directive and Living Will:      Power of :    POLST:      Counseling / Coordination of Care:   None

## 2020-09-21 NOTE — ANESTHESIA PREPROCEDURE EVALUATION
Procedure:  EXCISION GANGLION CYST (Left Wrist)    Relevant Problems   ANESTHESIA (within normal limits)      CARDIO (within normal limits)   (-) Chest pain      GI/HEPATIC   (+) Gastroesophageal reflux disease (not symptomatic )      MUSCULOSKELETAL   (+) Chronic bilateral low back pain without sciatica   (+) Myofascial pain syndrome      NEURO/PSYCH   (+) Anxiety   (+) Depression   (+) Myofascial pain syndrome   (+) Paresthesia of lower extremity      PULMONARY   (+) Current smoker (1/2 PPD)      Other   (+) Benign paroxysmal positional vertigo   (+) Cervical spinal stenosis   (+) Polyarthralgia        Physical Exam    Airway    Mallampati score: II  TM Distance: >3 FB  Neck ROM: full     Dental   upper dentures,     Cardiovascular  Cardiovascular exam normal    Pulmonary  Pulmonary exam normal     Other Findings        Anesthesia Plan  ASA Score- 2     Anesthesia Type- IV sedation with anesthesia with ASA Monitors  Additional Monitors:   Airway Plan:           Plan Factors-Exercise tolerance (METS): >4 METS  Chart reviewed  Existing labs reviewed  Patient is a current smoker  Patient instructed to abstain from smoking on day of procedure  Patient did not smoke on day of surgery  Induction-     Postoperative Plan- Plan for postoperative opioid use  Informed Consent- Anesthetic plan and risks discussed with patient  I personally reviewed this patient with the CRNA  Discussed and agreed on the Anesthesia Plan with the CRNA             Lab Results   Component Value Date    HGBA1C 6 0 08/22/2018       Lab Results   Component Value Date     04/10/2018    K 4 0 02/04/2020     02/04/2020    CO2 26 02/04/2020    ANIONGAP 13 04/10/2018    BUN 16 02/04/2020    CREATININE 0 65 02/04/2020    GLUCOSE 87 04/10/2018    CALCIUM 9 5 02/04/2020    AST 29 04/10/2018    ALT 25 04/10/2018    ALKPHOS 117 04/10/2018    PROT 7 9 04/10/2018    BILITOT 0 3 04/10/2018    EGFR 100 02/04/2020       Lab Results   Component Value Date    WBC 11 90 (H) 02/18/2020    HGB 13 7 02/18/2020    HCT 41 6 02/18/2020    MCV 91 02/18/2020     02/18/2020

## 2020-09-22 ENCOUNTER — HOSPITAL ENCOUNTER (OUTPATIENT)
Facility: HOSPITAL | Age: 55
Setting detail: OUTPATIENT SURGERY
Discharge: HOME | End: 2020-09-22
Attending: PLASTIC SURGERY | Admitting: PLASTIC SURGERY
Payer: COMMERCIAL

## 2020-09-22 ENCOUNTER — ANESTHESIA (OUTPATIENT)
Dept: PERIOP | Facility: HOSPITAL | Age: 55
End: 2020-09-22
Payer: COMMERCIAL

## 2020-09-22 VITALS
BODY MASS INDEX: 19.76 KG/M2 | OXYGEN SATURATION: 95 % | WEIGHT: 98 LBS | SYSTOLIC BLOOD PRESSURE: 99 MMHG | RESPIRATION RATE: 20 BRPM | DIASTOLIC BLOOD PRESSURE: 56 MMHG | HEART RATE: 64 BPM | TEMPERATURE: 97.7 F | HEIGHT: 59 IN

## 2020-09-22 VITALS — HEART RATE: 63 BPM

## 2020-09-22 DIAGNOSIS — M67.432 GANGLION, LEFT WRIST: Primary | ICD-10-CM

## 2020-09-22 PROCEDURE — 88304 TISSUE EXAM BY PATHOLOGIST: CPT | Performed by: PATHOLOGY

## 2020-09-22 RX ORDER — MIDAZOLAM HYDROCHLORIDE 2 MG/2ML
INJECTION, SOLUTION INTRAMUSCULAR; INTRAVENOUS AS NEEDED
Status: DISCONTINUED | OUTPATIENT
Start: 2020-09-22 | End: 2020-09-22

## 2020-09-22 RX ORDER — SODIUM CHLORIDE, SODIUM LACTATE, POTASSIUM CHLORIDE, CALCIUM CHLORIDE 600; 310; 30; 20 MG/100ML; MG/100ML; MG/100ML; MG/100ML
50 INJECTION, SOLUTION INTRAVENOUS CONTINUOUS
Status: DISCONTINUED | OUTPATIENT
Start: 2020-09-22 | End: 2020-09-22 | Stop reason: HOSPADM

## 2020-09-22 RX ORDER — PROMETHAZINE HYDROCHLORIDE 25 MG/ML
12.5 INJECTION, SOLUTION INTRAMUSCULAR; INTRAVENOUS ONCE AS NEEDED
Status: DISCONTINUED | OUTPATIENT
Start: 2020-09-22 | End: 2020-09-22 | Stop reason: HOSPADM

## 2020-09-22 RX ORDER — HYDROMORPHONE HCL/PF 1 MG/ML
0.5 SYRINGE (ML) INJECTION
Status: DISCONTINUED | OUTPATIENT
Start: 2020-09-22 | End: 2020-09-22 | Stop reason: HOSPADM

## 2020-09-22 RX ORDER — CEFAZOLIN SODIUM 1 G/50ML
1000 SOLUTION INTRAVENOUS ONCE
Status: COMPLETED | OUTPATIENT
Start: 2020-09-22 | End: 2020-09-22

## 2020-09-22 RX ORDER — PROPOFOL 10 MG/ML
INJECTION, EMULSION INTRAVENOUS AS NEEDED
Status: DISCONTINUED | OUTPATIENT
Start: 2020-09-22 | End: 2020-09-22

## 2020-09-22 RX ORDER — BUPIVACAINE HYDROCHLORIDE 5 MG/ML
INJECTION, SOLUTION PERINEURAL AS NEEDED
Status: DISCONTINUED | OUTPATIENT
Start: 2020-09-22 | End: 2020-09-22 | Stop reason: HOSPADM

## 2020-09-22 RX ORDER — KETAMINE HYDROCHLORIDE 50 MG/ML
INJECTION, SOLUTION, CONCENTRATE INTRAMUSCULAR; INTRAVENOUS AS NEEDED
Status: DISCONTINUED | OUTPATIENT
Start: 2020-09-22 | End: 2020-09-22

## 2020-09-22 RX ORDER — OXYCODONE HYDROCHLORIDE AND ACETAMINOPHEN 5; 325 MG/1; MG/1
1 TABLET ORAL EVERY 4 HOURS PRN
Status: DISCONTINUED | OUTPATIENT
Start: 2020-09-22 | End: 2020-09-22 | Stop reason: HOSPADM

## 2020-09-22 RX ORDER — MAGNESIUM HYDROXIDE 1200 MG/15ML
LIQUID ORAL AS NEEDED
Status: DISCONTINUED | OUTPATIENT
Start: 2020-09-22 | End: 2020-09-22 | Stop reason: HOSPADM

## 2020-09-22 RX ORDER — OXYCODONE HYDROCHLORIDE AND ACETAMINOPHEN 5; 325 MG/1; MG/1
1 TABLET ORAL EVERY 4 HOURS PRN
Qty: 20 TABLET | Refills: 0 | Status: SHIPPED | OUTPATIENT
Start: 2020-09-22 | End: 2020-10-02

## 2020-09-22 RX ORDER — PROPOFOL 10 MG/ML
INJECTION, EMULSION INTRAVENOUS CONTINUOUS PRN
Status: DISCONTINUED | OUTPATIENT
Start: 2020-09-22 | End: 2020-09-22

## 2020-09-22 RX ORDER — ONDANSETRON 2 MG/ML
INJECTION INTRAMUSCULAR; INTRAVENOUS AS NEEDED
Status: DISCONTINUED | OUTPATIENT
Start: 2020-09-22 | End: 2020-09-22

## 2020-09-22 RX ORDER — LIDOCAINE HYDROCHLORIDE 10 MG/ML
INJECTION, SOLUTION EPIDURAL; INFILTRATION; INTRACAUDAL; PERINEURAL AS NEEDED
Status: DISCONTINUED | OUTPATIENT
Start: 2020-09-22 | End: 2020-09-22

## 2020-09-22 RX ORDER — ONDANSETRON 2 MG/ML
4 INJECTION INTRAMUSCULAR; INTRAVENOUS ONCE AS NEEDED
Status: DISCONTINUED | OUTPATIENT
Start: 2020-09-22 | End: 2020-09-22 | Stop reason: HOSPADM

## 2020-09-22 RX ADMIN — MIDAZOLAM HYDROCHLORIDE 2 MG: 1 INJECTION, SOLUTION INTRAMUSCULAR; INTRAVENOUS at 10:19

## 2020-09-22 RX ADMIN — ONDANSETRON HYDROCHLORIDE 4 MG: 2 INJECTION, SOLUTION INTRAMUSCULAR; INTRAVENOUS at 10:19

## 2020-09-22 RX ADMIN — KETAMINE HYDROCHLORIDE 10 MG: 50 INJECTION, SOLUTION INTRAMUSCULAR; INTRAVENOUS at 10:28

## 2020-09-22 RX ADMIN — PROPOFOL 80 MCG/KG/MIN: 10 INJECTION, EMULSION INTRAVENOUS at 10:23

## 2020-09-22 RX ADMIN — LIDOCAINE HYDROCHLORIDE 50 MG: 10 INJECTION, SOLUTION EPIDURAL; INFILTRATION; INTRACAUDAL; PERINEURAL at 10:23

## 2020-09-22 RX ADMIN — CEFAZOLIN SODIUM 1000 MG: 1 SOLUTION INTRAVENOUS at 10:19

## 2020-09-22 RX ADMIN — PROPOFOL 20 MG: 10 INJECTION, EMULSION INTRAVENOUS at 10:23

## 2020-09-22 RX ADMIN — KETAMINE HYDROCHLORIDE 20 MG: 50 INJECTION, SOLUTION INTRAMUSCULAR; INTRAVENOUS at 10:23

## 2020-09-22 RX ADMIN — SODIUM CHLORIDE, SODIUM LACTATE, POTASSIUM CHLORIDE, AND CALCIUM CHLORIDE: .6; .31; .03; .02 INJECTION, SOLUTION INTRAVENOUS at 10:16

## 2020-09-22 NOTE — OP NOTE
OPERATIVE REPORT  PATIENT NAME: María Wilkinson    :  1965  MRN: [id number]  Pt Location:  OR ROOM 07    SURGERY DATE: 2020    Surgeon(s) and Role:     * Jose Fuentes MD - Primary    Preop Diagnosis:  Ganglion, left wrist [M67 432]    Post-Op Diagnosis Codes:     * Ganglion, left wrist [M67 432]    Procedure(s) (LRB):  EXCISION GANGLION CYST (Left)    Specimen(s):  ID Type Source Tests Collected by Time Destination   1 :  Tissue Ganglion Cyst TISSUE EXAM Jose Fuentes MD 2020 1105        Estimated Blood Loss:   Minimal    Drains:  * No LDAs found *    Anesthesia Type:   IV Sedation with Anesthesia    Operative Indications:  Ganglion, left wrist [M67 432]      Operative Findings:  Ganglion cyst left dorsal wrist    Complications:   None    Procedure and Technique:  Patient was draped and prepped in normal sterile fashion injected with local anesthesia  The tourniquet was inflated to 250 mmHg  Incision was made over the cyst of the dorsum left radial wrist   Under loupe magnification this was carried down to the radiocarpal joint  The cyst was excised from the radial carpal joint  The wound was irrigated with normal saline    Hemostasis achieved electrocautery the wound was closed with 4-0 Vicryl buried sutures a subcuticular 5 0 nylon suture for skin patient was then placed in a dorsal wrist splint   I was present for the entire procedure    Patient Disposition:  PACU     SIGNATURE: Jose Fuentes MD  DATE: 2020  TIME: 11:14 AM

## 2020-09-22 NOTE — INTERVAL H&P NOTE
H&P reviewed  After examining the patient I find no changes in the patients condition since the H&P had been written      Vitals:    09/22/20 0846   BP: 124/61   Pulse: 71   Resp: 18   Temp: 97 8 °F (36 6 °C)   SpO2: 97%

## 2020-09-22 NOTE — DISCHARGE INSTRUCTIONS
Ganglion Cyst Removal   WHAT YOU NEED TO KNOW:   Ganglion cyst removal is surgery to remove a small fluid-filled sac that usually occurs in the hand, wrist, foot, or ankle  In some cases, it can occur in other parts of the body  DISCHARGE INSTRUCTIONS:   Follow up with your healthcare provider or surgeon as directed: You may need to return to have your stitches or bandage removed  Write down your questions so you remember to ask them during your visits  Wound care:  Keep your surgical incision clean and dry  Ask your healthcare provider or surgeon how to care for your wound and when you can bathe  Check for signs of infection, such as redness or swelling, when you clean your wound  Activities:  Avoid strenuous work and heavy lifting for several weeks or as directed by your healthcare provider or surgeon  You may be able to do light activities and light lifting  Ask what activities are okay for you to do  Contact your healthcare provider or surgeon if:   · You have a fever  · Your wound is red, swollen, or draining pus  · You have numbness or tingling of the arm, hand, leg, or foot  · You have questions or concerns about your condition or care  Seek care immediately or call 911 if:   · Blood soaks through your bandage  · You cannot move your arm, hand, leg, or foot  © 2017 [employer]  Information is for End User's use only and may not be sold, redistributed or otherwise used for commercial purposes  All illustrations and images included in CareNotes® are the copyrighted property of A [employer] M [employer]  or [employer]  The above information is an  only  It is not intended as medical advice for individual conditions or treatments  Talk to your doctor, nurse or pharmacist before following any medical regimen to see if it is safe and effective for you

## 2020-09-22 NOTE — ANESTHESIA POSTPROCEDURE EVALUATION
Post-Op Assessment Note    CV Status:  Stable    Pain management: adequate     Mental Status:  Sleepy and arousable   Hydration Status:  Euvolemic   PONV Controlled:  Controlled   Airway Patency:  Patent      Post Op Vitals Reviewed: Yes      Staff: CRNA         No complications documented      /74 (09/22/20 1119)    Temp (!) 97 4 °F (36 3 °C) (09/22/20 1119)    Pulse 65 (09/22/20 1119)   Resp 12 (09/22/20 1119)    SpO2 100 % (09/22/20 1119)

## 2020-11-10 ENCOUNTER — ANNUAL EXAM (OUTPATIENT)
Dept: GYNECOLOGY | Facility: CLINIC | Age: 55
End: 2020-11-10
Payer: COMMERCIAL

## 2020-11-10 DIAGNOSIS — N89.8 VAGINAL DISCHARGE: ICD-10-CM

## 2020-11-10 DIAGNOSIS — Z90.710 HISTORY OF VAGINAL HYSTERECTOMY: ICD-10-CM

## 2020-11-10 DIAGNOSIS — Z12.31 ENCOUNTER FOR SCREENING MAMMOGRAM FOR BREAST CANCER: ICD-10-CM

## 2020-11-10 DIAGNOSIS — F17.210 CIGARETTE NICOTINE DEPENDENCE WITHOUT COMPLICATION: ICD-10-CM

## 2020-11-10 DIAGNOSIS — Z20.2 EXPOSURE TO STD: ICD-10-CM

## 2020-11-10 DIAGNOSIS — Z01.419 ENCOUNTER FOR ANNUAL ROUTINE GYNECOLOGICAL EXAMINATION: Primary | ICD-10-CM

## 2020-11-10 LAB
BV WHIFF TEST VAG QL: NEGATIVE
CLUE CELLS SPEC QL WET PREP: NEGATIVE
PH SMN: 4.5 [PH]
SL AMB POCT WET MOUNT: NORMAL
T VAGINALIS VAG QL WET PREP: NEGATIVE
YEAST VAG QL WET PREP: NEGATIVE

## 2020-11-10 PROCEDURE — 87210 SMEAR WET MOUNT SALINE/INK: CPT | Performed by: OBSTETRICS & GYNECOLOGY

## 2020-11-10 PROCEDURE — 99396 PREV VISIT EST AGE 40-64: CPT | Performed by: OBSTETRICS & GYNECOLOGY

## 2021-01-21 ENCOUNTER — IMMUNIZATIONS (OUTPATIENT)
Dept: FAMILY MEDICINE CLINIC | Facility: HOSPITAL | Age: 56
End: 2021-01-21

## 2021-01-21 DIAGNOSIS — Z23 ENCOUNTER FOR IMMUNIZATION: Primary | ICD-10-CM

## 2021-01-21 PROCEDURE — 91301 SARS-COV-2 / COVID-19 MRNA VACCINE (MODERNA) 100 MCG: CPT

## 2021-01-21 PROCEDURE — 0011A SARS-COV-2 / COVID-19 MRNA VACCINE (MODERNA) 100 MCG: CPT

## 2021-02-17 ENCOUNTER — IMMUNIZATIONS (OUTPATIENT)
Dept: FAMILY MEDICINE CLINIC | Facility: HOSPITAL | Age: 56
End: 2021-02-17

## 2021-02-17 DIAGNOSIS — Z23 ENCOUNTER FOR IMMUNIZATION: Primary | ICD-10-CM

## 2021-02-17 PROCEDURE — 0012A SARS-COV-2 / COVID-19 MRNA VACCINE (MODERNA) 100 MCG: CPT

## 2021-02-17 PROCEDURE — 91301 SARS-COV-2 / COVID-19 MRNA VACCINE (MODERNA) 100 MCG: CPT

## 2021-02-18 ENCOUNTER — TELEMEDICINE (OUTPATIENT)
Dept: FAMILY MEDICINE CLINIC | Facility: CLINIC | Age: 56
End: 2021-02-18
Payer: COMMERCIAL

## 2021-02-18 VITALS — TEMPERATURE: 99.9 F

## 2021-02-18 DIAGNOSIS — T88.1XXA POSTIMMUNIZATION REACTION, INITIAL ENCOUNTER: ICD-10-CM

## 2021-02-18 DIAGNOSIS — R42 VERTIGO: Primary | ICD-10-CM

## 2021-02-18 PROCEDURE — 99213 OFFICE O/P EST LOW 20 MIN: CPT | Performed by: NURSE PRACTITIONER

## 2021-02-18 NOTE — PROGRESS NOTES
Virtual Regular Visit      Assessment/Plan:    Problem List Items Addressed This Visit     None      Visit Diagnoses     Vertigo    -  Primary    Postimmunization reaction, initial encounter            OTC feer relief as needed    Take her vertigo med   Stay hydrated will call her tomorrow        Reason for visit is   Chief Complaint   Patient presents with    Fever     also needs letter for work    Dizziness    Headache    Virtual Regular Visit        Encounter provider THOM Munoz    Provider located at [address] [address]      Recent Visits  No visits were found meeting these conditions  Showing recent visits within past 7 days and meeting all other requirements     Today's Visits  Date Type Provider Dept   02/18/21 150 [facility], [address] [place] [address]   Showing today's visits and meeting all other requirements     Future Appointments  No visits were found meeting these conditions  Showing future appointments within next 150 days and meeting all other requirements        The patient was identified by name and date of birth  Daniel Lundborg was informed that this is a telemedicine visit and that the visit is being conducted through [facility] and patient was informed that this is a secure, HIPAA-compliant platform  She agrees to proceed     My office door was closed  No one else was in the room  She acknowledged consent and understanding of privacy and security of the video platform  The patient has agreed to participate and understands they can discontinue the visit at any time  Patient is aware this is a billable service  Subjective  Daniel Lundborg is a 63 y o  female had vaccine yesterday    Triggered vertigo and fever and headache  Chills and sweating   Temp 99 9 after tylenol    Was 2nd injection             HPI     Past Medical History:   Diagnosis Date    Anxiety  Cervical radiculopathy     Cervical spinal stenosis     Depression     GERD (gastroesophageal reflux disease)     Hiatal hernia     History of transfusion 1985    Myofascial pain syndrome     Polyarthralgia     TIA (transient ischemic attack)     Vertigo        Past Surgical History:   Procedure Laterality Date    CARPAL TUNNEL RELEASE       SECTION      CHOLECYSTECTOMY      COLONOSCOPY      ECTOPIC PREGNANCY SURGERY      HYSTERECTOMY      Total unilateral removal tube and ovary    LAPAROSCOPY      OOPHORECTOMY      has one ovary    [place] EXCIS PRIMARY GANGLION WRIST Left 2020    Procedure: EXCISION GANGLION CYST;  Surgeon: Brunilda Marley MD;  Location: [facility] MAIN OR;  Service: Plastics    [place] REVISION OF LOWER EYELID Bilateral 2020    Procedure: BLEPHAROPLASTY LOWER;  Surgeon: Brunilda Marley MD;  Location: [facility] MAIN OR;  Service: Plastics    PUBOVAGINAL SLING      REPAIR RECTOCELE      TONSILECTOMY AND ADNOIDECTOMY      TONSILLECTOMY         Current Outpatient Medications   Medication Sig Dispense Refill    busPIRone (BUSPAR) 5 mg tablet Currently 1 tab daily   May bump up if needed to twice a day 60 tablet 5    hydrOXYzine HCL (ATARAX) 25 mg tablet Take 1 tablet (25 mg total) by mouth every 8 (eight) hours as needed for itching 20 tablet 0    loratadine (CLARITIN) 10 mg tablet Take 10 mg by mouth daily      meclizine (ANTIVERT) 25 mg tablet 1 tablet every 8 (eight) hours as needed       nicotine (NICODERM CQ) 21 mg/24 hr TD 24 hr patch Place 1 patch on the skin every 24 hours 28 patch 0    nicotine polacrilex (NICORETTE) 2 mg gum Chew 1 each (2 mg total) as needed for smoking cessation 100 each 0    pantoprazole (PROTONIX) 40 mg tablet Take 1 tablet (40 mg total) by mouth daily 90 tablet 3     No current facility-administered medications for this visit           Allergies   Allergen Reactions    Gabapentin Hives    Escitalopram Drowsiness    Fluoxetine      Ringing in ears    Latex     Sertraline GI Intolerance    Varenicline GI Intolerance       Review of Systems   Constitutional: Positive for activity change, chills, fatigue and fever  Negative for appetite change  HENT: Negative for congestion and sore throat  Respiratory: Negative for cough  Cardiovascular: Negative for leg swelling  Neurological: Positive for dizziness  Video Exam    Vitals:    02/18/21 0941   Temp: 99 9 °F (37 7 °C)       Physical Exam  Vitals signs reviewed  Constitutional:       Appearance: Normal appearance  Pulmonary:      Effort: Pulmonary effort is normal    Neurological:      Mental Status: She is alert and oriented to person, place, and time  I spent 10 minutes directly with the patient during this visit      VIRTUAL VISIT DISCLAIMER    Noris Millerry acknowledges that she has consented to an online visit or consultation  She understands that the online visit is based solely on information provided by her, and that, in the absence of a face-to-face physical evaluation by the physician, the diagnosis she receives is both limited and provisional in terms of accuracy and completeness  This is not intended to replace a full medical face-to-face evaluation by the physician  Noris Reyna understands and accepts these terms

## 2021-02-19 ENCOUNTER — TELEMEDICINE (OUTPATIENT)
Dept: FAMILY MEDICINE CLINIC | Facility: CLINIC | Age: 56
End: 2021-02-19
Payer: COMMERCIAL

## 2021-02-19 VITALS — TEMPERATURE: 100.2 F

## 2021-02-19 DIAGNOSIS — T50.Z95S VACCINE REACTION, SEQUELA: Primary | ICD-10-CM

## 2021-02-19 PROCEDURE — 99441 PR PHYS/QHP TELEPHONE EVALUATION 5-10 MIN: CPT | Performed by: NURSE PRACTITIONER

## 2021-02-19 NOTE — PROGRESS NOTES
Virtual Brief Visit    Assessment/Plan:    Problem List Items Addressed This Visit     None      Visit Diagnoses     Vaccine reaction, sequela    -  Primary      Will stay home    Talk with her Monday    Probably ok to return on Tuesday to job at  [employer]  Will cont with IBU and tylenol  Encourage fluids  Rest           Reason for visit is   Chief Complaint   Patient presents with    Virtual Brief Visit    Fever     2nd covid vaccine 2/17/21      Fatigue    Virtual Brief Visit        Encounter provider THOM Pink    Provider located at [address] [address]    Recent Visits  Date Type Provider Dept   02/18/21 150 [facility], [address]    Showing recent visits within past 7 days and meeting all other requirements     Today's Visits  Date Type Provider Dept   02/19/21 150 [facility], [facility]   Showing today's visits and meeting all other requirements     Future Appointments  No visits were found meeting these conditions  Showing future appointments within next 150 days and meeting all other requirements        After connecting through telephone, the patient was identified by name and date of birth  Dru Liang was informed that this is a telemedicine visit and that the visit is being conducted through telephone  My office door was closed  No one else was in the room  She acknowledged consent and understanding of privacy and security of the platform  The patient has agreed to participate and understands she can discontinue the visit at any time  Patient is aware this is a billable service  Subjective    Dru Liang is a 63 y o  female Had vaccine #2 on Wednesday    Then started yesterday 4 am with symptoms  Fevers, body achiness and chills  Sore arm      Same thing happened this am     HPI     Past Medical History: Diagnosis Date    Anxiety     Cervical radiculopathy     Cervical spinal stenosis     Depression     GERD (gastroesophageal reflux disease)     Hiatal hernia     History of transfusion     Myofascial pain syndrome     Polyarthralgia     TIA (transient ischemic attack)     Vertigo        Past Surgical History:   Procedure Laterality Date    CARPAL TUNNEL RELEASE       SECTION      CHOLECYSTECTOMY      COLONOSCOPY      ECTOPIC PREGNANCY SURGERY      HYSTERECTOMY      Total unilateral removal tube and ovary    LAPAROSCOPY      OOPHORECTOMY      has one ovary    [place] EXCIS PRIMARY GANGLION WRIST Left 2020    Procedure: EXCISION GANGLION CYST;  Surgeon: Song Lewis MD;  Location: [address] [place] [address];  Service: Plastics    [place] REVISION OF LOWER EYELID Bilateral 2020    Procedure: BLEPHAROPLASTY LOWER;  Surgeon: Song Lewis MD;  Location: [address] [place] [address];  Service: Plastics    PUBOVAGINAL SLING      REPAIR RECTOCELE      TONSILECTOMY AND ADNOIDECTOMY      TONSILLECTOMY         Current Outpatient Medications   Medication Sig Dispense Refill    busPIRone (BUSPAR) 5 mg tablet Currently 1 tab daily   May bump up if needed to twice a day 60 tablet 5    hydrOXYzine HCL (ATARAX) 25 mg tablet Take 1 tablet (25 mg total) by mouth every 8 (eight) hours as needed for itching 20 tablet 0    loratadine (CLARITIN) 10 mg tablet Take 10 mg by mouth daily      meclizine (ANTIVERT) 25 mg tablet 1 tablet every 8 (eight) hours as needed       nicotine (NICODERM CQ) 21 mg/24 hr TD 24 hr patch Place 1 patch on the skin every 24 hours 28 patch 0    nicotine polacrilex (NICORETTE) 2 mg gum Chew 1 each (2 mg total) as needed for smoking cessation 100 each 0    pantoprazole (PROTONIX) 40 mg tablet Take 1 tablet (40 mg total) by mouth daily 90 tablet 3     No current facility-administered medications for this visit           Allergies   Allergen Reactions    Gabapentin Hives    Escitalopram Drowsiness  Fluoxetine      Ringing in ears    Latex     Sertraline GI Intolerance    Varenicline GI Intolerance       Review of Systems   Constitutional: Positive for activity change, appetite change, chills, fatigue and fever  HENT: Negative for congestion, postnasal drip and sore throat  Respiratory: Negative for cough  Neurological: Negative for dizziness, light-headedness, numbness and headaches  Vitals:    02/19/21 1015   Temp: 100 2 °F (37 9 °C)   TempSrc: Oral         I spent 10 minutes directly with the patient during this visit    VIRTUAL VISIT DISCLAIMER    Killianberny Gary acknowledges that she has consented to an online visit or consultation  She understands that the online visit is based solely on information provided by her, and that, in the absence of a face-to-face physical evaluation by the physician, the diagnosis she receives is both limited and provisional in terms of accuracy and completeness  This is not intended to replace a full medical face-to-face evaluation by the physician  Haresh Vega understands and accepts these terms

## 2021-02-22 ENCOUNTER — TELEPHONE (OUTPATIENT)
Dept: FAMILY MEDICINE CLINIC | Facility: CLINIC | Age: 56
End: 2021-02-22

## 2021-02-22 NOTE — LETTER
Dano Rueda  [birth year]    2/22/2021    Lorie Carson is released to return to work on 2/23/2021 with no limitations       Thank you,       Roxanne Early

## 2021-03-02 ENCOUNTER — TELEPHONE (OUTPATIENT)
Dept: FAMILY MEDICINE CLINIC | Facility: CLINIC | Age: 56
End: 2021-03-02

## 2021-03-02 NOTE — TELEPHONE ENCOUNTER
Patient called and would like you to call her  She states she was written up for missing work because of the reaction she had to the covid vaccine

## 2021-03-02 NOTE — TELEPHONE ENCOUNTER
Upset because written up for being out due to reaction from covid vaccine   She will check with HR I do have documentation about her progress with symptoms

## 2021-03-04 ENCOUNTER — TELEPHONE (OUTPATIENT)
Dept: FAMILY MEDICINE CLINIC | Facility: CLINIC | Age: 56
End: 2021-03-04

## 2021-03-04 NOTE — LETTER
3/4/2021     Philip Alonzo  1965      Nohemi Montanez had her 2nd covid vaccine on 2/17  She called me and had virtual visits with me on 2/18, 2/19, 2/22 in follow up   She was having vertigo which was attributed to the vaccine  She returned to work 2/23          Thank you,     Sincerely,       Sam Brito

## 2021-03-04 NOTE — TELEPHONE ENCOUNTER
Patient calling requesting a letter from [employer] regarding her missing work due to flare up of vertigo reaction from the vaccine  She was written up at work and HR is asking for a letter from [employer] can be sent to her email [email or web address]  Please advise

## 2021-03-24 ENCOUNTER — OFFICE VISIT (OUTPATIENT)
Dept: FAMILY MEDICINE CLINIC | Facility: CLINIC | Age: 56
End: 2021-03-24
Payer: COMMERCIAL

## 2021-03-24 ENCOUNTER — TELEPHONE (OUTPATIENT)
Dept: FAMILY MEDICINE CLINIC | Facility: CLINIC | Age: 56
End: 2021-03-24

## 2021-03-24 ENCOUNTER — TELEPHONE (OUTPATIENT)
Dept: ADMINISTRATIVE | Facility: OTHER | Age: 56
End: 2021-03-24

## 2021-03-24 VITALS
OXYGEN SATURATION: 98 % | HEART RATE: 78 BPM | SYSTOLIC BLOOD PRESSURE: 110 MMHG | BODY MASS INDEX: 21.41 KG/M2 | WEIGHT: 102 LBS | DIASTOLIC BLOOD PRESSURE: 70 MMHG | RESPIRATION RATE: 18 BRPM | TEMPERATURE: 97.6 F | HEIGHT: 58 IN

## 2021-03-24 DIAGNOSIS — Z11.59 NEED FOR HEPATITIS C SCREENING TEST: ICD-10-CM

## 2021-03-24 DIAGNOSIS — R42 DIZZINESS AND GIDDINESS: Primary | ICD-10-CM

## 2021-03-24 DIAGNOSIS — Z13.220 SCREENING, LIPID: ICD-10-CM

## 2021-03-24 PROCEDURE — 99214 OFFICE O/P EST MOD 30 MIN: CPT | Performed by: NURSE PRACTITIONER

## 2021-03-24 NOTE — LETTER
[facility] Medical Records Request for Care Gap Closure    Medical Records Fax: [phone]    Date Requested: 21  Patient: Tess Bryant  Patient : 1965   Requesting on behalf of Provider: Florentino Vargas, [facility] has requested the retrieval and updating of CRC: Colonoscopy  The office staff has provided us with the following information listed below  Prior to sending this request I have reviewed Epic Chart Review, completed an Epic Chart Search, and reviewed Care Everywhere documents  Estimated Date of Service: Unknown  Facility: [facility]  Specialty: n/a  Performing Provider: n/a  Additional Comments: n/a     Your assistance in completing this request is greatly appreciated  Please send document to [phone] attention Glenn Page: Phone [phone]       Sincerely,      Rae Granda

## 2021-03-24 NOTE — TELEPHONE ENCOUNTER
----- Message from Priscilla Jimenez, [address] sent at 3/24/2021 11:58 AM EDT -----  colonoscopy  2015 or 2016  at [facility]

## 2021-03-24 NOTE — PROGRESS NOTES
Assessment/Plan:         Diagnoses and all orders for this visit:    Dizziness and giddiness  -     Ambulatory referral to Neurology; Future  -     CBC and differential; Future  -     Comprehensive metabolic panel; Future  -     TSH, 3rd generation with Free T4 reflex; Future    Need for hepatitis C screening test  -     Hepatitis C Antibody ([facility]); Future    Screening, lipid  -     Lipid panel; Future    Other orders  -     Cancel: Cologuard; Future  -     Cancel: PNEUMOCOCCAL POLYSACCHARIDE VACCINE 23-VALENT =>1YO SQ IM          Subjective:      Patient ID: Luis Figueroa is a 63 y o  female  HPI  Getting vertigo 2-3 times a day   But it passes  Then when she gets the bad ones are about once a month and lasting 1 day  Did see ENT and found nothing on MRI   Was having ringing in ear and found nothing    Did do PT about several years ago  Started back in 2012      The following portions of the patient's history were reviewed and updated as appropriate: allergies, current medications, past family history, past medical history, past social history, past surgical history and problem list     Review of Systems   Constitutional: Negative for activity change, appetite change, chills and fever  HENT: Positive for congestion, postnasal drip and rhinorrhea  Negative for ear pain and sore throat  Eyes: Negative for pain and visual disturbance  Respiratory: Negative for cough and shortness of breath  Cardiovascular: Negative for chest pain and palpitations  Gastrointestinal: Negative for abdominal pain and vomiting  Genitourinary: Negative for dysuria, hematuria and urgency  Musculoskeletal: Negative for arthralgias and back pain  Skin: Negative for color change and rash  Neurological: Positive for dizziness and light-headedness  Negative for seizures, syncope, weakness and numbness  Psychiatric/Behavioral: The patient is not nervous/anxious      All other systems reviewed and are negative  Objective:  Vitals:    03/24/21 1125   BP: 110/70   BP Location: Left arm   Patient Position: Sitting   Cuff Size: Standard   Pulse: 78   Resp: 18   Temp: 97 6 °F (36 4 °C)   TempSrc: Temporal   SpO2: 98%   Weight: 46 3 kg (102 lb)   Height: 4' 10" (1 473 m)      Physical Exam  Vitals signs reviewed  Constitutional:       Appearance: Normal appearance  She is well-developed and normal weight  HENT:      Head: Normocephalic  Right Ear: Tympanic membrane, ear canal and external ear normal       Left Ear: Tympanic membrane, ear canal and external ear normal    Eyes:      General:         Right eye: No discharge  Left eye: No discharge  Conjunctiva/sclera: Conjunctivae normal       Pupils: Pupils are equal, round, and reactive to light  Neck:      Musculoskeletal: Normal range of motion and neck supple  Thyroid: No thyromegaly  Cardiovascular:      Rate and Rhythm: Normal rate and regular rhythm  Heart sounds: Normal heart sounds  No murmur  Pulmonary:      Effort: Pulmonary effort is normal       Breath sounds: Normal breath sounds  Abdominal:      General: Bowel sounds are normal       Palpations: Abdomen is soft  Tenderness: There is no abdominal tenderness  Musculoskeletal: Normal range of motion  Lymphadenopathy:      Cervical: No cervical adenopathy  Skin:     General: Skin is warm  Findings: No rash  Neurological:      General: No focal deficit present  Mental Status: She is alert and oriented to person, place, and time  Psychiatric:         Mood and Affect: Mood normal          Behavior: Behavior normal          Thought Content:  Thought content normal

## 2021-03-25 NOTE — TELEPHONE ENCOUNTER
Upon review of the In Basket request and the patient's chart, initial outreach has been made via fax, please see Contacts section for details       Thank you  Esvin List

## 2021-03-30 ENCOUNTER — APPOINTMENT (OUTPATIENT)
Dept: LAB | Facility: CLINIC | Age: 56
End: 2021-03-30
Payer: COMMERCIAL

## 2021-03-30 DIAGNOSIS — Z11.59 NEED FOR HEPATITIS C SCREENING TEST: ICD-10-CM

## 2021-03-30 DIAGNOSIS — Z13.220 SCREENING, LIPID: ICD-10-CM

## 2021-03-30 DIAGNOSIS — R42 DIZZINESS AND GIDDINESS: ICD-10-CM

## 2021-03-30 LAB
ALBUMIN SERPL BCP-MCNC: 4.3 G/DL (ref 3.5–5)
ALP SERPL-CCNC: 113 U/L (ref 46–116)
ALT SERPL W P-5'-P-CCNC: 20 U/L (ref 12–78)
ANION GAP SERPL CALCULATED.3IONS-SCNC: 5 MMOL/L (ref 4–13)
AST SERPL W P-5'-P-CCNC: 18 U/L (ref 5–45)
BASOPHILS # BLD AUTO: 0.03 THOUSANDS/ΜL (ref 0–0.1)
BASOPHILS NFR BLD AUTO: 0 % (ref 0–1)
BILIRUB SERPL-MCNC: 0.44 MG/DL (ref 0.2–1)
BUN SERPL-MCNC: 13 MG/DL (ref 5–25)
CALCIUM SERPL-MCNC: 9.3 MG/DL (ref 8.3–10.1)
CHLORIDE SERPL-SCNC: 105 MMOL/L (ref 100–108)
CHOLEST SERPL-MCNC: 181 MG/DL (ref 50–200)
CO2 SERPL-SCNC: 27 MMOL/L (ref 21–32)
CREAT SERPL-MCNC: 0.72 MG/DL (ref 0.6–1.3)
EOSINOPHIL # BLD AUTO: 0.05 THOUSAND/ΜL (ref 0–0.61)
EOSINOPHIL NFR BLD AUTO: 1 % (ref 0–6)
ERYTHROCYTE [DISTWIDTH] IN BLOOD BY AUTOMATED COUNT: 13.6 % (ref 11.6–15.1)
GFR SERPL CREATININE-BSD FRML MDRD: 94 ML/MIN/1.73SQ M
GLUCOSE P FAST SERPL-MCNC: 82 MG/DL (ref 65–99)
HCT VFR BLD AUTO: 46.2 % (ref 34.8–46.1)
HCV AB SER QL: NORMAL
HDLC SERPL-MCNC: 64 MG/DL
HGB BLD-MCNC: 14.5 G/DL (ref 11.5–15.4)
IMM GRANULOCYTES # BLD AUTO: 0.03 THOUSAND/UL (ref 0–0.2)
IMM GRANULOCYTES NFR BLD AUTO: 0 % (ref 0–2)
LDLC SERPL CALC-MCNC: 89 MG/DL (ref 0–100)
LYMPHOCYTES # BLD AUTO: 2.11 THOUSANDS/ΜL (ref 0.6–4.47)
LYMPHOCYTES NFR BLD AUTO: 22 % (ref 14–44)
MCH RBC QN AUTO: 29.2 PG (ref 26.8–34.3)
MCHC RBC AUTO-ENTMCNC: 31.4 G/DL (ref 31.4–37.4)
MCV RBC AUTO: 93 FL (ref 82–98)
MONOCYTES # BLD AUTO: 0.35 THOUSAND/ΜL (ref 0.17–1.22)
MONOCYTES NFR BLD AUTO: 4 % (ref 4–12)
NEUTROPHILS # BLD AUTO: 6.84 THOUSANDS/ΜL (ref 1.85–7.62)
NEUTS SEG NFR BLD AUTO: 73 % (ref 43–75)
NONHDLC SERPL-MCNC: 117 MG/DL
NRBC BLD AUTO-RTO: 0 /100 WBCS
PLATELET # BLD AUTO: 208 THOUSANDS/UL (ref 149–390)
PMV BLD AUTO: 10.2 FL (ref 8.9–12.7)
POTASSIUM SERPL-SCNC: 4 MMOL/L (ref 3.5–5.3)
PROT SERPL-MCNC: 7.9 G/DL (ref 6.4–8.2)
RBC # BLD AUTO: 4.96 MILLION/UL (ref 3.81–5.12)
SODIUM SERPL-SCNC: 137 MMOL/L (ref 136–145)
TRIGL SERPL-MCNC: 138 MG/DL
TSH SERPL DL<=0.05 MIU/L-ACNC: 0.81 UIU/ML (ref 0.36–3.74)
WBC # BLD AUTO: 9.41 THOUSAND/UL (ref 4.31–10.16)

## 2021-03-30 PROCEDURE — 80053 COMPREHEN METABOLIC PANEL: CPT

## 2021-03-30 PROCEDURE — 84443 ASSAY THYROID STIM HORMONE: CPT

## 2021-03-30 PROCEDURE — 85025 COMPLETE CBC W/AUTO DIFF WBC: CPT

## 2021-03-30 PROCEDURE — 36415 COLL VENOUS BLD VENIPUNCTURE: CPT

## 2021-03-30 PROCEDURE — 86803 HEPATITIS C AB TEST: CPT

## 2021-03-30 PROCEDURE — 80061 LIPID PANEL: CPT

## 2021-04-01 NOTE — TELEPHONE ENCOUNTER
Upon review of the In Basket request we have found as a result of outreach that patient did not have the requested item(s) completed  MRO only found EGD Report for patient  Please see in media  Any additional questions or concerns should be emailed to the Practice Liaisons via [email or web address] email, please do not reply via In Basket      Thank you  Zander Mcconnell

## 2021-04-02 ENCOUNTER — TELEPHONE (OUTPATIENT)
Dept: NEUROLOGY | Facility: CLINIC | Age: 56
End: 2021-04-02

## 2021-04-02 NOTE — TELEPHONE ENCOUNTER
I offered to get patient scheduled at the [facility] or [facility] for a sooner appointment  Patient said she ONLY wants [place] and wanted to wait until December  [facility]      (patient declined being scheduled in other offices  )  Best contact number for patient: [phone]     Emergency Contact name and number:     Referring provider and telephone number:    Primary Care Provider Name and if affiliated with [facility]: CORTEZ Clements     Reason for Appointment/Dx: dizziness & giddiness     Have you seen and followed up with a pediatric Neurologist for this disease in the past?      No (If yes ok to schedule with Dr Heather Kaiser)    Neurology Location patient would like to be seen:    Order received? Yes                                                 Records Received? Yes    Have you ever seen another Neurologist?       No    Insurance Information    Insurance Name:    ID/Policy #:    Secondary Insurance:    ID/Policy#: Workman's Comp/ Accident/ School  Information      Workman's Comp/Accident/School related?        No    If yes name of Insurance company:    Date of Injury:    Type of Injury:    [address] Name and Telephone Number:    Notes:                   Appointment date: 12/20/2021

## 2021-05-15 ENCOUNTER — OFFICE VISIT (OUTPATIENT)
Dept: URGENT CARE | Age: 56
End: 2021-05-15
Payer: COMMERCIAL

## 2021-05-15 VITALS
TEMPERATURE: 98.1 F | OXYGEN SATURATION: 97 % | WEIGHT: 102 LBS | RESPIRATION RATE: 18 BRPM | BODY MASS INDEX: 21.32 KG/M2 | HEART RATE: 77 BPM

## 2021-05-15 DIAGNOSIS — J02.9 SORE THROAT: Primary | ICD-10-CM

## 2021-05-15 LAB — S PYO AG THROAT QL: NEGATIVE

## 2021-05-15 PROCEDURE — G0382 LEV 3 HOSP TYPE B ED VISIT: HCPCS | Performed by: PHYSICIAN ASSISTANT

## 2021-05-15 PROCEDURE — 87880 STREP A ASSAY W/OPTIC: CPT | Performed by: PHYSICIAN ASSISTANT

## 2021-05-15 PROCEDURE — 87070 CULTURE OTHR SPECIMN AEROBIC: CPT | Performed by: PHYSICIAN ASSISTANT

## 2021-05-15 PROCEDURE — 87635 SARS-COV-2 COVID-19 AMP PRB: CPT | Performed by: PHYSICIAN ASSISTANT

## 2021-05-15 RX ORDER — AMOXICILLIN 500 MG/1
500 CAPSULE ORAL EVERY 8 HOURS SCHEDULED
Qty: 21 CAPSULE | Refills: 0 | Status: SHIPPED | OUTPATIENT
Start: 2021-05-15 | End: 2021-05-22

## 2021-05-15 RX ORDER — AMOXICILLIN 500 MG/1
500 CAPSULE ORAL EVERY 8 HOURS SCHEDULED
Qty: 21 CAPSULE | Refills: 0 | Status: SHIPPED | OUTPATIENT
Start: 2021-05-15 | End: 2021-05-15

## 2021-05-15 NOTE — LETTER
May 15, 2021     Patient: Shaggy Ford   YOB: 1965   Date of Visit: 5/15/2021       To Whom It May Concern: It is my medical opinion that Shaggy Ford should remain out of work until cleared by lab examination  If you have any questions or concerns, please don't hesitate to call           Sincerely,        Ranjan Ferrari PA-C    CC: No Recipients

## 2021-05-15 NOTE — PROGRESS NOTES
[facility]        NAME: Shaggy Ford is a 63 y o  female  : 1965    MRN: [id number]  DATE: May 15, 2021  TIME: 1:11 PM    Assessment and Plan   Sore throat [J02 9]  1  Sore throat  amoxicillin (AMOXIL) 500 mg capsule         Patient Instructions       Continue to monitor symptoms  If new or worsening symptoms develop, go immediately to Er  Drink plenty of fluids  Follow up with Family Doctor this week  Chief Complaint     Chief Complaint   Patient presents with    Sore Throat     Sore throat/ headache/diarrhea began yesterday         History of Present Illness       Sore Throat   This is a new problem  The current episode started yesterday  The problem has been gradually worsening  Neither side of throat is experiencing more pain than the other  There has been no fever  The pain is at a severity of 4/10  Associated symptoms include congestion, diarrhea (3 episodes last episode 6 hours ago), swollen glands and trouble swallowing  Pertinent negatives include no abdominal pain, coughing, drooling, ear discharge, ear pain, headaches, hoarse voice, plugged ear sensation, neck pain, shortness of breath, stridor or vomiting  She has had no exposure to strep or mono  She has tried acetaminophen for the symptoms  The treatment provided no relief  Review of Systems   Review of Systems   Constitutional: Negative for chills, diaphoresis, fatigue and fever  HENT: Positive for congestion, postnasal drip, sinus pressure, sneezing, sore throat and trouble swallowing  Negative for drooling, ear discharge, ear pain, hoarse voice, rhinorrhea and voice change  Eyes: Negative  Respiratory: Negative for cough, chest tightness, shortness of breath, wheezing and stridor  Cardiovascular: Negative for chest pain and palpitations  Gastrointestinal: Positive for diarrhea (3 episodes last episode 6 hours ago)  Negative for abdominal pain, constipation, nausea and vomiting     Endocrine: Negative  Genitourinary: Negative for dysuria  Musculoskeletal: Negative for back pain, myalgias and neck pain  Skin: Negative for pallor and rash  Allergic/Immunologic: Negative  Neurological: Negative for dizziness, syncope and headaches  Hematological: Negative  Psychiatric/Behavioral: Negative            Current Medications       Current Outpatient Medications:     busPIRone (BUSPAR) 5 mg tablet, Currently 1 tab daily   May bump up if needed to twice a day, Disp: 60 tablet, Rfl: 5    hydrOXYzine HCL (ATARAX) 25 mg tablet, Take 1 tablet (25 mg total) by mouth every 8 (eight) hours as needed for itching, Disp: 20 tablet, Rfl: 0    loratadine (CLARITIN) 10 mg tablet, Take 10 mg by mouth daily, Disp: , Rfl:     meclizine (ANTIVERT) 25 mg tablet, 1 tablet every 8 (eight) hours as needed , Disp: , Rfl:     nicotine (NICODERM CQ) 21 mg/24 hr TD 24 hr patch, Place 1 patch on the skin every 24 hours, Disp: 28 patch, Rfl: 0    nicotine polacrilex (NICORETTE) 2 mg gum, Chew 1 each (2 mg total) as needed for smoking cessation, Disp: 100 each, Rfl: 0    pantoprazole (PROTONIX) 40 mg tablet, Take 1 tablet (40 mg total) by mouth daily, Disp: 90 tablet, Rfl: 3    amoxicillin (AMOXIL) 500 mg capsule, Take 1 capsule (500 mg total) by mouth every 8 (eight) hours for 7 days, Disp: 21 capsule, Rfl: 0    Current Allergies     Allergies as of 05/15/2021 - Reviewed 05/15/2021   Allergen Reaction Noted    Gabapentin Hives 02/05/2018    Escitalopram Drowsiness 06/07/2018    Fluoxetine  04/02/2020    Latex  06/07/2018    Sertraline GI Intolerance 06/07/2018    Varenicline GI Intolerance 06/07/2018            The following portions of the patient's history were reviewed and updated as appropriate: allergies, current medications, past family history, past medical history, past social history, past surgical history and problem list      Past Medical History:   Diagnosis Date    Anxiety     Cervical radiculopathy     Cervical spinal stenosis     Depression     GERD (gastroesophageal reflux disease)     Hiatal hernia     History of transfusion     Myofascial pain syndrome     Polyarthralgia     TIA (transient ischemic attack)     Vertigo        Past Surgical History:   Procedure Laterality Date    CARPAL TUNNEL RELEASE       SECTION      CHOLECYSTECTOMY      COLONOSCOPY      ECTOPIC PREGNANCY SURGERY      HYSTERECTOMY      Total unilateral removal tube and ovary    LAPAROSCOPY      OOPHORECTOMY      has one ovary    NJ EXCIS PRIMARY GANGLION WRIST Left 2020    Procedure: EXCISION GANGLION CYST;  Surgeon: Etta Marie MD;  Location:  MAIN OR;  Service: Plastics    [place] REVISION OF LOWER EYELID Bilateral 2020    Procedure: BLEPHAROPLASTY LOWER;  Surgeon: Etta Marie MD;  Location:  MAIN OR;  Service: Plastics    PUBOVAGINAL SLING      REPAIR RECTOCELE      TONSILECTOMY AND ADNOIDECTOMY      TONSILLECTOMY         Family History   Problem Relation Age of Onset    Asthma Mother     Hypertension Father     Ulcers Brother     HIV Brother     Thyroid disease Other     Stroke Other     Hepatitis Sister         C and B    No Known Problems Daughter     No Known Problems Son     Other Maternal Grandmother         pre-diabetic    No Known Problems Maternal Grandfather     No Known Problems Paternal Grandmother     Diabetes Paternal Grandfather     No Known Problems Maternal Aunt     No Known Problems Maternal Aunt     No Known Problems Maternal Aunt     No Known Problems Maternal Aunt     No Known Problems Maternal Aunt     No Known Problems Maternal Aunt     No Known Problems Paternal Aunt     No Known Problems Paternal Aunt          Medications have been verified          Objective   Pulse 77   Temp 98 1 °F (36 7 °C)   Resp 18   Wt 46 3 kg (102 lb)   LMP  (LMP Unknown)   SpO2 97%   BMI 21 32 kg/m²        Physical Exam     Physical Exam  Vitals signs and nursing note reviewed  Constitutional:       General: She is not in acute distress  Appearance: Normal appearance  She is well-developed  She is not ill-appearing or diaphoretic  HENT:      Head: Normocephalic and atraumatic  Right Ear: Tympanic membrane, ear canal and external ear normal       Left Ear: Tympanic membrane, ear canal and external ear normal       Nose: Congestion present  No rhinorrhea  Mouth/Throat:      Pharynx: Posterior oropharyngeal erythema present  No oropharyngeal exudate  Eyes:      General:         Right eye: No discharge  Left eye: No discharge  Conjunctiva/sclera: Conjunctivae normal    Neck:      Musculoskeletal: Normal range of motion and neck supple  Cardiovascular:      Rate and Rhythm: Normal rate and regular rhythm  Heart sounds: Normal heart sounds  Pulmonary:      Effort: Pulmonary effort is normal  No respiratory distress  Breath sounds: Normal breath sounds  No wheezing or rales  Abdominal:      General: Abdomen is flat  Bowel sounds are normal  There is no distension  Palpations: Abdomen is soft  Tenderness: There is no abdominal tenderness  There is no guarding  Lymphadenopathy:      Cervical: Cervical adenopathy present  Skin:     General: Skin is warm  Capillary Refill: Capillary refill takes less than 2 seconds  Findings: No erythema or rash  Neurological:      Mental Status: She is alert

## 2021-05-15 NOTE — PATIENT INSTRUCTIONS
Patient Instructions   COVID testing initiated  Results may take up to 5-10 days to return, but often come back sooner (2-4 days)     If the patient has a [facility] My Chart account, results may be accessed on line  If the patient does not have the [facility] Chart account, please establish one so results can be accessed  This will be the easiest and quickest way to get a copy of your test results if you require printed documentation  If patient is symptomatic and until results are obtained, home quarantine / self isolation strongly encouraged  If testing is done for screening purposes and patient is not symptomatic, we still recommend masking, social distancing, good hygiene practices be followed  If COVID test is positive, patient / care giver will be contacted by ordering provider or designated staff  If COVID test is positive, please call the primary care provider office to inform of positive test and request follow up evaluation appointment  (Generally, primary care providers are doing telemedicine visits with their positive COVID patients )  If COVID test is positive, please again review all information below  Further questions may be addressed by the primary care provider or the [address] [place] [address] [facility] at [phone]  If the patient / caregiver has not heard about test results or has been unable to access results on the patient My Chart account in a timely fashion, please call the provider's office where test was ordered (or Hot Line if applicable)  to inquire about results  If results are negative and patient / care giver has been found to have already accessed results through the [facility]  [employer], no call will be made  Until results are obtained, home quarantine / self isolation strongly encouraged       If the patient would develop profound weakness, chest pain, shortness of breath please proceed to an emergency room for further evaluation otherwise we do recommend that patient follow-up with their primary care provider in the next 5-7 days if not improving  Symptomatic treatment as needed for symptoms relief based on age / medical status of patient  Things like warm salt water gargles, Tylenol or Ibuprofen (if not contraindicated), drinking plenty of fluids, nasal saline rinses / spray, warm tea with honey (not for patients less than 1 year of age),  etc may provide symptoms relief  [address]     Your healthcare provider and/or public health staff have evaluated you and have determined that you do not need to remain in the hospital at this time  At this time you can be isolated at home where you will be monitored by staff from your local or state health department  You should carefully follow the prevention and isolation steps below until a healthcare provider or local or state health department says that you can return to your normal activities  Stay home except to get medical care     People who are mildly ill with COVID-19 are able to isolate at home during their illness  You should restrict activities outside your home, except for getting medical care  Do not go to work, school, or public areas  Avoid using public transportation, ride-sharing, or taxis  Separate yourself from other people and animals in your home     People: As much as possible, you should stay in a specific room and away from other people in your home  Also, you should use a separate bathroom, if available  Animals: You should restrict contact with pets and other animals while you are sick with COVID-19, just like you would around other people  Although there have not been reports of pets or other animals becoming sick with COVID-19, it is still recommended that people sick with COVID-19 limit contact with animals until more information is known about the virus   When possible, have another member of your household care for your animals while you are sick  If you are sick with COVID-19, avoid contact with your pet, including petting, snuggling, being kissed or licked, and sharing food  If you must care for your pet or be around animals while you are sick, wash your hands before and after you interact with pets and wear a facemask  See COVID-19 and Animals for more information  Call ahead before visiting your doctor     If you have a medical appointment, call the healthcare provider and tell them that you have or may have COVID-19  This will help the healthcare providers office take steps to keep other people from getting infected or exposed  Wear a facemask     You should wear a facemask when you are around other people (e g , sharing a room or vehicle) or pets and before you enter a healthcare providers office  If you are not able to wear a facemask (for example, because it causes trouble breathing), then people who live with you should not stay in the same room with you, or they should wear a facemask if they enter your room  Cover your coughs and sneezes     Cover your mouth and nose with a tissue when you cough or sneeze  Throw used tissues in a lined trash can  Immediately wash your hands with soap and water for at least 20 seconds or, if soap and water are not available, clean your hands with an alcohol-based hand  that contains at least 60% alcohol  Clean your hands often     Wash your hands often with soap and water for at least 20 seconds, especially after blowing your nose, coughing, or sneezing; going to the bathroom; and before eating or preparing food  If soap and water are not readily available, use an alcohol-based hand  with at least 60% alcohol, covering all surfaces of your hands and rubbing them together until they feel dry  Soap and water are the best option if hands are visibly dirty  Avoid touching your eyes, nose, and mouth with unwashed hands       Avoid sharing personal household items     You should not share dishes, drinking glasses, cups, eating utensils, towels, or bedding with other people or pets in your home  After using these items, they should be washed thoroughly with soap and water  Clean all high-touch surfaces everyday     High touch surfaces include counters, tabletops, doorknobs, bathroom fixtures, toilets, phones, keyboards, tablets, and bedside tables  Also, clean any surfaces that may have blood, stool, or body fluids on them  Use a household cleaning spray or wipe, according to the label instructions  Labels contain instructions for safe and effective use of the cleaning product including precautions you should take when applying the product, such as wearing gloves and making sure you have good ventilation during use of the product  Monitor your symptoms     Seek prompt medical attention if your illness is worsening (e g , difficulty breathing)  Before seeking care, call your healthcare provider and tell them that you have, or are being evaluated for, COVID-19  Put on a facemask before you enter the facility  These steps will help the healthcare providers office to keep other people in the office or waiting room from getting infected or exposed  Ask your healthcare provider to call the local or [facility] health department  Persons who are placed under active monitoring or facilitated self-monitoring should follow instructions provided by their local health department or occupational health professionals, as appropriate  If you have a medical emergency and need to call 911, notify the dispatch personnel that you have, or are being evaluated for COVID-19  If possible, put on a facemask before emergency medical services arrive       Discontinuing home isolation     Patients with confirmed COVID-19 should remain under home isolation precautions until the following conditions are met:   § They have had no fever for at least 24 hours (that is one full day of no fever without the use medicine that reduces fevers)  AND  § other symptoms have improved (for example, when their cough or shortness of breath have improved)  AND  § at least 10 days have passed since their symptoms first appeared     Patients with confirmed COVID-19 should also notify close contacts (including their workplace) and ask that they self-quarantine  Currently, close contact is defined as being within 6 feet for for a cumulative total of 15 minutes or more over a 24 hour period starting from 2 days before illness onset  (or, for asymptomatic patients, 2 days prior to test specimen collection)  Close contacts of patients diagnosed with COVID-19 should be instructed by the patient to self-quarantine for 14 days from the last time of their last contact with the patient        Source: [employer]

## 2021-05-17 LAB — BACTERIA THROAT CULT: NORMAL

## 2021-05-18 LAB — SARS-COV-2 RNA RESP QL NAA+PROBE: NEGATIVE

## 2021-06-11 DIAGNOSIS — F41.9 ANXIETY: ICD-10-CM

## 2021-06-11 RX ORDER — HYDROXYZINE HYDROCHLORIDE 25 MG/1
25 TABLET, FILM COATED ORAL EVERY 8 HOURS PRN
Qty: 20 TABLET | Refills: 0 | Status: SHIPPED | OUTPATIENT
Start: 2021-06-11

## 2021-06-24 NOTE — TELEPHONE ENCOUNTER
Called and left a message  Offered a sooner appointment-7/15/21 w/Dr Cr Ordoñez in [facility]  Advised to callback, and all availability is subject to change

## 2021-07-24 ENCOUNTER — LAB (OUTPATIENT)
Dept: LAB | Facility: HOSPITAL | Age: 56
End: 2021-07-24
Payer: COMMERCIAL

## 2021-07-24 ENCOUNTER — APPOINTMENT (OUTPATIENT)
Dept: LAB | Facility: HOSPITAL | Age: 56
End: 2021-07-24

## 2021-07-24 DIAGNOSIS — Z20.2 EXPOSURE TO STD: ICD-10-CM

## 2021-07-24 DIAGNOSIS — Z00.8 HEALTH EXAMINATION IN POPULATION SURVEY: ICD-10-CM

## 2021-07-24 LAB
CHOLEST SERPL-MCNC: 187 MG/DL
EST. AVERAGE GLUCOSE BLD GHB EST-MCNC: 114 MG/DL
HBA1C MFR BLD: 5.6 %
HDLC SERPL-MCNC: 63 MG/DL
LDLC SERPL CALC-MCNC: 100 MG/DL
NONHDLC SERPL-MCNC: 124 MG/DL
TRIGL SERPL-MCNC: 119 MG/DL

## 2021-07-24 PROCEDURE — 83036 HEMOGLOBIN GLYCOSYLATED A1C: CPT

## 2021-07-24 PROCEDURE — 86592 SYPHILIS TEST NON-TREP QUAL: CPT

## 2021-07-24 PROCEDURE — 36415 COLL VENOUS BLD VENIPUNCTURE: CPT

## 2021-07-24 PROCEDURE — 80061 LIPID PANEL: CPT

## 2021-07-24 PROCEDURE — 87389 HIV-1 AG W/HIV-1&-2 AB AG IA: CPT

## 2021-07-26 LAB
HIV 1+2 AB+HIV1 P24 AG SERPL QL IA: NORMAL
RPR SER QL: NORMAL

## 2021-07-27 ENCOUNTER — TELEPHONE (OUTPATIENT)
Dept: NEUROLOGY | Facility: CLINIC | Age: 56
End: 2021-07-27

## 2021-08-25 ENCOUNTER — TELEPHONE (OUTPATIENT)
Dept: NEUROLOGY | Facility: CLINIC | Age: 56
End: 2021-08-25

## 2021-08-25 NOTE — TELEPHONE ENCOUNTER
Called and left a me/leilage  Offered a sooner appointment 8/26/21 in [facility]  Advised to callback if patient would be interested, appointment is on hold currently

## 2021-09-15 ENCOUNTER — TELEMEDICINE (OUTPATIENT)
Dept: FAMILY MEDICINE CLINIC | Facility: CLINIC | Age: 56
End: 2021-09-15
Payer: COMMERCIAL

## 2021-09-15 VITALS — TEMPERATURE: 100.4 F

## 2021-09-15 DIAGNOSIS — B34.9 VIRAL INFECTION, UNSPECIFIED: Primary | ICD-10-CM

## 2021-09-15 LAB — SARS-COV-2 RNA RESP QL NAA+PROBE: NEGATIVE

## 2021-09-15 PROCEDURE — U0005 INFEC AGEN DETEC AMPLI PROBE: HCPCS | Performed by: NURSE PRACTITIONER

## 2021-09-15 PROCEDURE — 99213 OFFICE O/P EST LOW 20 MIN: CPT | Performed by: NURSE PRACTITIONER

## 2021-09-15 PROCEDURE — U0003 INFECTIOUS AGENT DETECTION BY NUCLEIC ACID (DNA OR RNA); SEVERE ACUTE RESPIRATORY SYNDROME CORONAVIRUS 2 (SARS-COV-2) (CORONAVIRUS DISEASE [COVID-19]), AMPLIFIED PROBE TECHNIQUE, MAKING USE OF HIGH THROUGHPUT TECHNOLOGIES AS DESCRIBED BY CMS-2020-01-R: HCPCS | Performed by: NURSE PRACTITIONER

## 2021-09-15 NOTE — PROGRESS NOTES
COVID-19 Outpatient Progress Note    Assessment/Plan:    Problem List Items Addressed This Visit     None      Visit Diagnoses     Viral infection, unspecified    -  Primary    Relevant Orders    Novel Coronavirus (Covid-19),PCR [facility] - Collected in Office         Disposition:     I recommended the patient to come to our office to perform PCR testing for COVID-19  I have spent 10 minutes directly with the patient  Greater than 50% of this time was spent in counseling/coordination of care regarding: prognosis and risk factor reductions  Verification of patient location:    Patient is located in the following state in which I hold an active license [place]    Encounter provider THOM Ray    Provider located at [address] [place] [id number]    Recent Visits  No visits were found meeting these conditions  Showing recent visits within past 7 days and meeting all other requirements  Today's Visits  Date Type Provider Dept   09/15/21 [address] [place] [address], [address] [place] [address]   Showing today's visits and meeting all other requirements  Future Appointments  No visits were found meeting these conditions  Showing future appointments within next 150 days and meeting all other requirements     This virtual check-in was done via App[employer] and patient was informed that this is a secure, HIPAA-compliant platform  She agrees to proceed  Patient agrees to participate in a virtual check in via telephone or video visit instead of presenting to the office to address urgent/immediate medical needs  Patient is aware this is a billable service  After connecting through [facility], the patient was identified by name and date of birth  [facility] was informed that this was a telemedicine visit and that the exam was being conducted confidentially over secure lines  My office door was closed   No one else was in the room  Elijah Stevens acknowledged consent and understanding of privacy and security of the telemedicine visit  I informed the patient that I have reviewed her record in Epic and presented the opportunity for her to ask any questions regarding the visit today  The patient agreed to participate  Subjective:   Elijah Stevens is a 63 y o  female who is concerned about COVID-19  Patient's symptoms include fever, chills, fatigue, nasal congestion, sore throat, cough, shortness of breath, myalgias and headache  Patient denies anosmia, loss of taste, chest tightness, abdominal pain, nausea, vomiting and diarrhea       Date of symptom onset: 9/14/2021  COVID-19 vaccination status: Fully vaccinated    Exposure:   Contact with a person who is under investigation (PUI) for or who is positive for COVID-19 within the last 14 days?: Yes    Hospitalized recently for fever and/or lower respiratory symptoms?: No      Currently a healthcare worker that is involved in direct patient care?: Yes      Works in a special setting where the risk of COVID-19 transmission may be high? (this may include long-term care, correctional and long-term facilities; homeless shelters; assisted-living facilities and group homes ): No      Resident in a special setting where the risk of COVID-19 transmission may be high? (this may include long-term care, correctional and long-term facilities; homeless shelters; assisted-living facilities and group homes ): No      Altered smell yesterday     Lab Results   Component Value Date    SARSCOV2 Negative 05/15/2021     Past Medical History:   Diagnosis Date    Anxiety     Cervical radiculopathy     Cervical spinal stenosis     Depression     GERD (gastroesophageal reflux disease)     Hiatal hernia     History of transfusion 1985    Myofascial pain syndrome     Polyarthralgia     TIA (transient ischemic attack)     Vertigo      Past Surgical History:   Procedure Laterality Date    CARPAL TUNNEL RELEASE       SECTION      CHOLECYSTECTOMY      COLONOSCOPY      ECTOPIC PREGNANCY SURGERY      HYSTERECTOMY      Total unilateral removal tube and ovary    LAPAROSCOPY      OOPHORECTOMY      has one ovary    WI EXCIS PRIMARY GANGLION WRIST Left 2020    Procedure: EXCISION GANGLION CYST;  Surgeon: Shelley Black MD;  Location: [address] [place] [address] OR;  Service: Plastics    WI REVISION OF LOWER EYELID Bilateral 2020    Procedure: BLEPHAROPLASTY LOWER;  Surgeon: Shelley Black MD;  Location: [address] [place] [address] OR;  Service: Plastics    PUBOVAGINAL SLING      REPAIR RECTOCELE      TONSILECTOMY AND ADNOIDECTOMY      TONSILLECTOMY       Current Outpatient Medications   Medication Sig Dispense Refill    busPIRone (BUSPAR) 5 mg tablet Currently 1 tab daily   May bump up if needed to twice a day 60 tablet 5    hydrOXYzine HCL (ATARAX) 25 mg tablet Take 1 tablet (25 mg total) by mouth every 8 (eight) hours as needed for itching 20 tablet 0    loratadine (CLARITIN) 10 mg tablet Take 10 mg by mouth daily      meclizine (ANTIVERT) 25 mg tablet 1 tablet every 8 (eight) hours as needed       nicotine (NICODERM CQ) 21 mg/24 hr TD 24 hr patch Place 1 patch on the skin every 24 hours 28 patch 0    nicotine polacrilex (NICORETTE) 2 mg gum Chew 1 each (2 mg total) as needed for smoking cessation 100 each 0    pantoprazole (PROTONIX) 40 mg tablet Take 1 tablet (40 mg total) by mouth daily 90 tablet 3     No current facility-administered medications for this visit  Allergies   Allergen Reactions    Gabapentin Hives    Escitalopram Drowsiness    Fluoxetine      Ringing in ears    Latex     Sertraline GI Intolerance    Varenicline GI Intolerance       Review of Systems   Constitutional: Positive for chills, fatigue and fever  HENT: Positive for congestion and sore throat  Respiratory: Positive for cough and shortness of breath  Negative for chest tightness      Gastrointestinal: Negative for abdominal pain, diarrhea, nausea and vomiting  Musculoskeletal: Positive for myalgias  Neurological: Positive for headaches  Objective:    Vitals:    09/15/21 0917   Temp: 100 4 °F (38 °C)       Physical Exam  Constitutional:       Appearance: She is ill-appearing  Pulmonary:      Effort: Pulmonary effort is normal    Neurological:      Mental Status: She is alert and oriented to person, place, and time  VIRTUAL VISIT DISCLAIMER    Marques Huggins verbally agrees to participate in [employer]  Pt is aware that [employer] could be limited without vital signs or the ability to perform a full hands-on physical [place] understands she or the provider may request at any time to terminate the video visit and request the patient to seek care or treatment in person

## 2021-09-17 ENCOUNTER — APPOINTMENT (EMERGENCY)
Dept: RADIOLOGY | Facility: HOSPITAL | Age: 56
End: 2021-09-17
Payer: COMMERCIAL

## 2021-09-17 ENCOUNTER — HOSPITAL ENCOUNTER (EMERGENCY)
Facility: HOSPITAL | Age: 56
Discharge: HOME | End: 2021-09-17
Attending: EMERGENCY MEDICINE
Payer: COMMERCIAL

## 2021-09-17 ENCOUNTER — TELEPHONE (OUTPATIENT)
Dept: FAMILY MEDICINE CLINIC | Facility: CLINIC | Age: 56
End: 2021-09-17

## 2021-09-17 VITALS
SYSTOLIC BLOOD PRESSURE: 134 MMHG | RESPIRATION RATE: 18 BRPM | OXYGEN SATURATION: 96 % | TEMPERATURE: 98.4 F | HEART RATE: 81 BPM | DIASTOLIC BLOOD PRESSURE: 87 MMHG

## 2021-09-17 DIAGNOSIS — R68.89 FLU-LIKE SYMPTOMS: Primary | ICD-10-CM

## 2021-09-17 DIAGNOSIS — J06.9 URI (UPPER RESPIRATORY INFECTION): ICD-10-CM

## 2021-09-17 LAB
ALBUMIN SERPL BCP-MCNC: 4.5 G/DL (ref 3–5.2)
ALP SERPL-CCNC: 92 U/L (ref 43–122)
ALT SERPL W P-5'-P-CCNC: 21 U/L
ANION GAP SERPL CALCULATED.3IONS-SCNC: 9 MMOL/L (ref 5–14)
AST SERPL W P-5'-P-CCNC: 37 U/L (ref 14–36)
BACTERIA UR QL AUTO: ABNORMAL /HPF
BASOPHILS # BLD AUTO: 0 THOUSANDS/ΜL (ref 0–0.1)
BASOPHILS NFR BLD AUTO: 0 % (ref 0–1)
BILIRUB SERPL-MCNC: 0.37 MG/DL
BILIRUB UR QL STRIP: NEGATIVE
BUN SERPL-MCNC: 13 MG/DL (ref 5–25)
CALCIUM SERPL-MCNC: 9.3 MG/DL (ref 8.4–10.2)
CHLORIDE SERPL-SCNC: 108 MMOL/L (ref 97–108)
CLARITY UR: CLEAR
CO2 SERPL-SCNC: 23 MMOL/L (ref 22–30)
COLOR UR: ABNORMAL
CREAT SERPL-MCNC: 0.5 MG/DL (ref 0.6–1.2)
EOSINOPHIL # BLD AUTO: 0.1 THOUSAND/ΜL (ref 0–0.4)
EOSINOPHIL NFR BLD AUTO: 1 % (ref 0–6)
ERYTHROCYTE [DISTWIDTH] IN BLOOD BY AUTOMATED COUNT: 14.2 %
FLUAV RNA RESP QL NAA+PROBE: NEGATIVE
FLUBV RNA RESP QL NAA+PROBE: NEGATIVE
GFR SERPL CREATININE-BSD FRML MDRD: 109 ML/MIN/1.73SQ M
GLUCOSE SERPL-MCNC: 91 MG/DL (ref 70–99)
GLUCOSE UR STRIP-MCNC: NEGATIVE MG/DL
HCT VFR BLD AUTO: 43.3 % (ref 36–46)
HGB BLD-MCNC: 14 G/DL (ref 12–16)
HGB UR QL STRIP.AUTO: 150
KETONES UR STRIP-MCNC: NEGATIVE MG/DL
LEUKOCYTE ESTERASE UR QL STRIP: NEGATIVE
LYMPHOCYTES # BLD AUTO: 1.4 THOUSANDS/ΜL (ref 0.5–4)
LYMPHOCYTES NFR BLD AUTO: 24 % (ref 25–45)
MCH RBC QN AUTO: 29.9 PG (ref 26–34)
MCHC RBC AUTO-ENTMCNC: 32.4 G/DL (ref 31–36)
MCV RBC AUTO: 92 FL (ref 80–100)
MONOCYTES # BLD AUTO: 0.4 THOUSAND/ΜL (ref 0.2–0.9)
MONOCYTES NFR BLD AUTO: 7 % (ref 1–10)
NEUTROPHILS # BLD AUTO: 3.9 THOUSANDS/ΜL (ref 1.8–7.8)
NEUTS SEG NFR BLD AUTO: 67 % (ref 45–65)
NITRITE UR QL STRIP: NEGATIVE
NON-SQ EPI CELLS URNS QL MICRO: ABNORMAL /HPF
PH UR STRIP.AUTO: 5 [PH]
PLATELET # BLD AUTO: 149 THOUSANDS/UL (ref 150–450)
PMV BLD AUTO: 8.7 FL (ref 8.9–12.7)
POTASSIUM SERPL-SCNC: 4.3 MMOL/L (ref 3.6–5)
PROT SERPL-MCNC: 7.7 G/DL (ref 5.9–8.4)
PROT UR STRIP-MCNC: NEGATIVE MG/DL
RBC # BLD AUTO: 4.69 MILLION/UL (ref 4–5.2)
RBC #/AREA URNS AUTO: ABNORMAL /HPF
RSV RNA RESP QL NAA+PROBE: NEGATIVE
S PYO DNA THROAT QL NAA+PROBE: NORMAL
SARS-COV-2 RNA RESP QL NAA+PROBE: NEGATIVE
SODIUM SERPL-SCNC: 140 MMOL/L (ref 137–147)
SP GR UR STRIP.AUTO: 1.01 (ref 1–1.04)
UROBILINOGEN UA: NEGATIVE MG/DL
WBC # BLD AUTO: 5.9 THOUSAND/UL (ref 4.5–11)
WBC #/AREA URNS AUTO: ABNORMAL /HPF

## 2021-09-17 PROCEDURE — 36415 COLL VENOUS BLD VENIPUNCTURE: CPT | Performed by: EMERGENCY MEDICINE

## 2021-09-17 PROCEDURE — 96374 THER/PROPH/DIAG INJ IV PUSH: CPT

## 2021-09-17 PROCEDURE — 80053 COMPREHEN METABOLIC PANEL: CPT | Performed by: EMERGENCY MEDICINE

## 2021-09-17 PROCEDURE — 99284 EMERGENCY DEPT VISIT MOD MDM: CPT | Performed by: EMERGENCY MEDICINE

## 2021-09-17 PROCEDURE — 71045 X-RAY EXAM CHEST 1 VIEW: CPT

## 2021-09-17 PROCEDURE — 81001 URINALYSIS AUTO W/SCOPE: CPT | Performed by: EMERGENCY MEDICINE

## 2021-09-17 PROCEDURE — 99284 EMERGENCY DEPT VISIT MOD MDM: CPT

## 2021-09-17 PROCEDURE — 85025 COMPLETE CBC W/AUTO DIFF WBC: CPT | Performed by: EMERGENCY MEDICINE

## 2021-09-17 PROCEDURE — 96361 HYDRATE IV INFUSION ADD-ON: CPT

## 2021-09-17 PROCEDURE — 87651 STREP A DNA AMP PROBE: CPT | Performed by: EMERGENCY MEDICINE

## 2021-09-17 PROCEDURE — 0241U HB NFCT DS VIR RESP RNA 4 TRGT: CPT | Performed by: EMERGENCY MEDICINE

## 2021-09-17 RX ORDER — BENZONATATE 100 MG/1
100 CAPSULE ORAL EVERY 8 HOURS
Qty: 21 CAPSULE | Refills: 0 | Status: SHIPPED | OUTPATIENT
Start: 2021-09-17 | End: 2021-12-15 | Stop reason: ALTCHOICE

## 2021-09-17 RX ORDER — FLUTICASONE PROPIONATE 50 MCG
1 SPRAY, SUSPENSION (ML) NASAL DAILY
Qty: 16 G | Refills: 0 | Status: SHIPPED | OUTPATIENT
Start: 2021-09-17

## 2021-09-17 RX ORDER — IBUPROFEN 600 MG/1
600 TABLET ORAL EVERY 6 HOURS PRN
Qty: 30 TABLET | Refills: 0 | Status: SHIPPED | OUTPATIENT
Start: 2021-09-17

## 2021-09-17 RX ORDER — GUAIFENESIN 600 MG
1200 TABLET, EXTENDED RELEASE 12 HR ORAL EVERY 12 HOURS SCHEDULED
Qty: 30 TABLET | Refills: 0 | Status: SHIPPED | OUTPATIENT
Start: 2021-09-17 | End: 2022-01-12 | Stop reason: ALTCHOICE

## 2021-09-17 RX ORDER — ALBUTEROL SULFATE 90 UG/1
2 AEROSOL, METERED RESPIRATORY (INHALATION) EVERY 4 HOURS PRN
Qty: 18 G | Refills: 0 | Status: SHIPPED | OUTPATIENT
Start: 2021-09-17 | End: 2022-01-12 | Stop reason: ALTCHOICE

## 2021-09-17 RX ORDER — BENZONATATE 100 MG/1
100 CAPSULE ORAL ONCE
Status: COMPLETED | OUTPATIENT
Start: 2021-09-17 | End: 2021-09-17

## 2021-09-17 RX ORDER — KETOROLAC TROMETHAMINE 30 MG/ML
15 INJECTION, SOLUTION INTRAMUSCULAR; INTRAVENOUS ONCE
Status: COMPLETED | OUTPATIENT
Start: 2021-09-17 | End: 2021-09-17

## 2021-09-17 RX ORDER — IPRATROPIUM BROMIDE AND ALBUTEROL SULFATE 2.5; .5 MG/3ML; MG/3ML
3 SOLUTION RESPIRATORY (INHALATION) ONCE
Status: COMPLETED | OUTPATIENT
Start: 2021-09-17 | End: 2021-09-17

## 2021-09-17 RX ADMIN — IPRATROPIUM BROMIDE AND ALBUTEROL SULFATE 3 ML: 2.5; .5 SOLUTION RESPIRATORY (INHALATION) at 13:00

## 2021-09-17 RX ADMIN — SODIUM CHLORIDE 1000 ML: 0.9 INJECTION, SOLUTION INTRAVENOUS at 12:50

## 2021-09-17 RX ADMIN — KETOROLAC TROMETHAMINE 15 MG: 30 INJECTION, SOLUTION INTRAMUSCULAR; INTRAVENOUS at 12:59

## 2021-09-17 RX ADMIN — BENZONATATE 100 MG: 100 CAPSULE ORAL at 12:58

## 2021-09-17 NOTE — TELEPHONE ENCOUNTER
Have her come to back door and we can strep screen her    May all be viral   No treatment until strep back

## 2021-09-17 NOTE — TELEPHONE ENCOUNTER
Virtual visit 9/15/2021  covid negative now has Sore throat  Headache feels terrible  Please advise   [phone]

## 2021-09-17 NOTE — Clinical Note
Cristobal Narayanan was seen and treated in our emergency department on 9/17/2021  Diagnosis:     Dolores Bence    She may return on this date: 09/21/2021         If you have any questions or concerns, please don't hesitate to call        Faustino De Leon RN    ______________________________           _______________          _______________  Hospital Representative                              Date                                Time

## 2021-09-17 NOTE — ED PROVIDER NOTES
History  Chief Complaint   Patient presents with    Flu Symptoms     dizzy, headache, coughing, diarrhea     56-year-old female presents with complaint of URI type symptoms  She states that over the past several days she has had runny nose, congestion, cough  She has had generalized weakness and fatigue along with some mild vertigo  She reports that she recently developed diarrhea as well  She had a negative COVID test performed several days ago but states that her symptoms have progressed  She now complains a sore throat and worsening cough as well  URI  Presenting symptoms: congestion, cough, fatigue, fever, rhinorrhea and sore throat    Onset quality:  Gradual  Timing:  Constant  Progression:  Worsening  Relieved by:  Nothing  Worsened by:  Nothing  Ineffective treatments:  OTC medications  Associated symptoms: arthralgias and myalgias    Associated symptoms: no headaches, no sinus pain and no wheezing        Prior to Admission Medications   Prescriptions Last Dose Informant Patient Reported?  Taking?   busPIRone (BUSPAR) 5 mg tablet  Self No No   Sig: Currently 1 tab daily   May bump up if needed to twice a day   hydrOXYzine HCL (ATARAX) 25 mg tablet  Self No No   Sig: Take 1 tablet (25 mg total) by mouth every 8 (eight) hours as needed for itching   loratadine (CLARITIN) 10 mg tablet  Self Yes No   Sig: Take 10 mg by mouth daily   meclizine (ANTIVERT) 25 mg tablet  Self Yes No   Si tablet every 8 (eight) hours as needed    nicotine (NICODERM CQ) 21 mg/24 hr TD 24 hr patch  Self No No   Sig: Place 1 patch on the skin every 24 hours   nicotine polacrilex (NICORETTE) 2 mg gum  Self No No   Sig: Chew 1 each (2 mg total) as needed for smoking cessation   pantoprazole (PROTONIX) 40 mg tablet  Self No No   Sig: Take 1 tablet (40 mg total) by mouth daily      Facility-Administered Medications: None       Past Medical History:   Diagnosis Date    Anxiety     Cervical radiculopathy     Cervical spinal stenosis     Depression     GERD (gastroesophageal reflux disease)     Hiatal hernia     History of transfusion     Myofascial pain syndrome     Polyarthralgia     TIA (transient ischemic attack)     Vertigo        Past Surgical History:   Procedure Laterality Date    CARPAL TUNNEL RELEASE       SECTION      CHOLECYSTECTOMY      COLONOSCOPY      ECTOPIC PREGNANCY SURGERY      HYSTERECTOMY      Total unilateral removal tube and ovary    LAPAROSCOPY      OOPHORECTOMY      has one ovary    ID EXCIS PRIMARY GANGLION WRIST Left 2020    Procedure: EXCISION GANGLION CYST;  Surgeon: Annemarie Riley MD;  Location:  MAIN OR;  Service: Plastics    ID REVISION OF LOWER EYELID Bilateral 2020    Procedure: BLEPHAROPLASTY LOWER;  Surgeon: Annemarie Riley MD;  Location:  MAIN OR;  Service: Plastics    PUBOVAGINAL SLING      REPAIR RECTOCELE      TONSILECTOMY AND ADNOIDECTOMY      TONSILLECTOMY         Family History   Problem Relation Age of Onset    Asthma Mother     Hypertension Father     Ulcers Brother     HIV Brother     Thyroid disease Other     Stroke Other     Hepatitis Sister         C and B    No Known Problems Daughter     No Known Problems Son     Other Maternal Grandmother         pre-diabetic    No Known Problems Maternal Grandfather     No Known Problems Paternal Grandmother     Diabetes Paternal Grandfather     No Known Problems Maternal Aunt     No Known Problems Maternal Aunt     No Known Problems Maternal Aunt     No Known Problems Maternal Aunt     No Known Problems Maternal Aunt     No Known Problems Maternal Aunt     No Known Problems Paternal Aunt     No Known Problems Paternal Aunt      I have reviewed and agree with the history as documented      E-Cigarette/Vaping    E-Cigarette Use Never User      E-Cigarette/Vaping Substances    Nicotine No     THC No     CBD No     Flavoring No     Other No     Unknown No      Social History Tobacco Use    Smoking status: Current Some Day Smoker     Packs/day: 0 25    Smokeless tobacco: Never Used    Tobacco comment: using patch and gum   Vaping Use    Vaping Use: Never used   Substance Use Topics    Alcohol use: No    Drug use: Never       Review of Systems   Constitutional: Positive for activity change, appetite change, fatigue and fever  Negative for chills  HENT: Positive for congestion, rhinorrhea and sore throat  Negative for postnasal drip, sinus pain and trouble swallowing  Eyes: Negative for redness and itching  Respiratory: Positive for cough  Negative for chest tightness, shortness of breath and wheezing  Cardiovascular: Negative for chest pain and leg swelling  Gastrointestinal: Positive for diarrhea  Negative for abdominal pain, constipation, nausea and vomiting  Endocrine: Negative  Genitourinary: Negative for difficulty urinating and dysuria  Musculoskeletal: Positive for arthralgias and myalgias  Negative for back pain  Skin: Negative for rash  Allergic/Immunologic: Negative  Neurological: Negative for dizziness, numbness and headaches  Hematological: Negative  Psychiatric/Behavioral: Negative  All other systems reviewed and are negative  Physical Exam  Physical Exam  Vitals and nursing note reviewed  Constitutional:       General: She is not in acute distress  Appearance: Normal appearance  She is well-developed  She is ill-appearing  She is not toxic-appearing or diaphoretic  HENT:      Head: Normocephalic and atraumatic  Right Ear: External ear normal       Left Ear: External ear normal       Nose: Congestion and rhinorrhea present  Mouth/Throat:      Mouth: Mucous membranes are moist       Pharynx: Oropharynx is clear  Posterior oropharyngeal erythema present  Eyes:      Conjunctiva/sclera: Conjunctivae normal       Pupils: Pupils are equal, round, and reactive to light     Cardiovascular:      Rate and Rhythm: Normal rate and regular rhythm  Heart sounds: Normal heart sounds  Pulmonary:      Effort: Pulmonary effort is normal  No respiratory distress  Breath sounds: Normal breath sounds  No wheezing  Abdominal:      General: Bowel sounds are normal       Palpations: Abdomen is soft  Tenderness: There is no abdominal tenderness  There is no guarding  Musculoskeletal:         General: No tenderness or deformity  Cervical back: Normal range of motion and neck supple  No rigidity  Skin:     General: Skin is warm and dry  Capillary Refill: Capillary refill takes less than 2 seconds  Findings: No rash  Neurological:      General: No focal deficit present  Mental Status: She is alert and oriented to person, place, and time     Psychiatric:         Mood and Affect: Mood normal          Behavior: Behavior normal          Vital Signs  ED Triage Vitals   Temperature Pulse Respirations Blood Pressure SpO2   09/17/21 1217 09/17/21 1217 09/17/21 1217 09/17/21 1217 09/17/21 1217   98 4 °F (36 9 °C) 87 18 142/91 99 %      Temp Source Heart Rate Source Patient Position - Orthostatic VS BP Location FiO2 (%)   09/17/21 1217 09/17/21 1217 09/17/21 1217 09/17/21 1217 --   Oral Monitor Lying Left arm       Pain Score       09/17/21 1259       5           Vitals:    09/17/21 1217   BP: 142/91   Pulse: 87   Patient Position - Orthostatic VS: Lying         Visual Acuity      ED Medications  Medications   sodium chloride 0 9 % bolus 1,000 mL (1,000 mL Intravenous New Bag 9/17/21 1250)   ketorolac (TORADOL) injection 15 mg (15 mg Intravenous Given 9/17/21 1259)   benzonatate (TESSALON PERLES) capsule 100 mg (100 mg Oral Given 9/17/21 1258)   ipratropium-albuterol (DUO-NEB) 0 5-2 5 mg/3 mL inhalation solution 3 mL (3 mL Nebulization Given 9/17/21 1300)       Diagnostic Studies  Results Reviewed     Procedure Component Value Units Date/Time    COVID19, Influenza A/B, RSV PCR, [facility] [[id number]]  (Normal) Collected: 09/17/21 1238    Lab Status: Final result Specimen: Nares from Nasopharyngeal Swab Updated: 09/17/21 1353     SARS-CoV-2 Negative     INFLUENZA A PCR Negative     INFLUENZA B PCR Negative     RSV PCR Negative    Narrative: This test has been authorized by FDA under an EUA (Emergency Use Assay) for use by authorized laboratories  Clinical caution and judgement should be used with the interpretation of these results with consideration of the clinical impression and other laboratory testing  Testing reported as "Positive" or "Negative" has been proven to be accurate according to standard laboratory validation requirements  All testing is performed with control materials showing appropriate reactivity at standard intervals  UA (URINE) with reflex to Scope [[id number]] Collected: 09/17/21 1347    Lab Status: No result Specimen: Urine, Clean Catch     Strep A PCR [[id number]]  (Normal) Collected: 09/17/21 1238    Lab Status: Final result Specimen: Throat Updated: 09/17/21 1343     STREP A PCR None Detected    CBC and differential [[id number]]  (Abnormal) Collected: 09/17/21 1250    Lab Status: Final result Specimen: Blood from Arm, Left Updated: 09/17/21 1339     WBC 5 90 Thousand/uL      RBC 4 69 Million/uL      Hemoglobin 14 0 g/dL      Hematocrit 43 3 %      MCV 92 fL      MCH 29 9 pg      MCHC 32 4 g/dL      RDW 14 2 %      MPV 8 7 fL      Platelets 641 Thousands/uL      Neutrophils Relative 67 %      Lymphocytes Relative 24 %      Monocytes Relative 7 %      Eosinophils Relative 1 %      Basophils Relative 0 %      Neutrophils Absolute 3 90 Thousands/µL      Lymphocytes Absolute 1 40 Thousands/µL      Monocytes Absolute 0 40 Thousand/µL      Eosinophils Absolute 0 10 Thousand/µL      Basophils Absolute 0 00 Thousands/µL     Comprehensive metabolic panel [[id number]] Collected: 09/17/21 1250    Lab Status:  In process Specimen: Blood from Arm, Left Updated: 09/17/21 1322                 XR chest 1 view portable   Final Result by Mireille Cedillo MD (09/17 6016)   No acute cardiopulmonary disease  Findings are stable            Workstation performed: [id number]                    Procedures  Procedures         ED Course                                           MDM  Number of Diagnoses or Management Options  Flu-like symptoms  URI (upper respiratory infection)  Diagnosis management comments: 13-year-old female presents with URI type symptoms  After receiving medications here, symptoms began to improve slightly  Test were negative for flu, COVID, strep  Advised of the need for follow-up along with supportive care measures and expected clinical course  Amount and/or Complexity of Data Reviewed  Clinical lab tests: ordered and reviewed  Tests in the radiology section of CPT®: ordered and reviewed  Independent visualization of images, tracings, or specimens: yes        Disposition  Final diagnoses:   Flu-like symptoms   URI (upper respiratory infection)     Time reflects when diagnosis was documented in both MDM as applicable and the Disposition within this note     Time User Action Codes Description Comment    9/17/2021  2:19 PM Thana Pen Add [R68 89] Flu-like symptoms     9/17/2021  2:19 PM Thana Pen Add [J06 9] URI (upper respiratory infection)       ED Disposition     ED Disposition Condition Date/Time Comment    Discharge Stable Fri Sep 17, 2021  2:19 PM Jessica Kruse discharge to home/self care              Follow-up Information     Follow up With Specialties Details Daniel Mcintosh 69, [address] [facility] Nurse Practitioner   [address] [place] [address]  [phone]            Patient's Medications   Discharge Prescriptions    ALBUTEROL (PROVENTIL HFA,VENTOLIN HFA) 90 MCG/ACT INHALER    Inhale 2 puffs every 4 (four) hours as needed for wheezing       Start Date: 9/17/2021 End Date: --       Order Dose: 2 puffs       Quantity: 18 g    Refills: 0    BENZONATATE (TESSALON PERLES) 100 MG CAPSULE    Take 1 capsule (100 mg total) by mouth every 8 (eight) hours       Start Date: 9/17/2021 End Date: --       Order Dose: 100 mg       Quantity: 21 capsule    Refills: 0    FLUTICASONE (FLONASE) 50 MCG/ACT NASAL SPRAY    1 spray into each nostril daily       Start Date: 9/17/2021 End Date: --       Order Dose: 1 spray       Quantity: 16 g    Refills: 0    GUAIFENESIN (MUCINEX) 600 MG 12 HR TABLET    Take 2 tablets (1,200 mg total) by mouth every 12 (twelve) hours       Start Date: 9/17/2021 End Date: --       Order Dose: 1,200 mg       Quantity: 30 tablet    Refills: 0    IBUPROFEN (MOTRIN) 600 MG TABLET    Take 1 tablet (600 mg total) by mouth every 6 (six) hours as needed for mild pain or moderate pain       Start Date: 9/17/2021 End Date: --       Order Dose: 600 mg       Quantity: 30 tablet    Refills: 0    MENTHOL-CETYLPYRIDINIUM (CEPACOL) 3 MG LOZENGE    Take 1 lozenge (3 mg total) by mouth as needed for sore throat       Start Date: 9/17/2021 End Date: --       Order Dose: 3 mg       Quantity: 9 lozenge    Refills: 0     No discharge procedures on file      PDMP Review     None          ED Provider  Electronically Signed by           Briana Barnard DO  09/17/21 9355

## 2021-09-28 ENCOUNTER — HOSPITAL ENCOUNTER (OUTPATIENT)
Dept: MAMMOGRAPHY | Facility: CLINIC | Age: 56
Discharge: HOME | End: 2021-09-28
Payer: COMMERCIAL

## 2021-09-28 DIAGNOSIS — Z12.31 ENCOUNTER FOR SCREENING MAMMOGRAM FOR MALIGNANT NEOPLASM OF BREAST: ICD-10-CM

## 2021-09-28 PROCEDURE — 77067 SCR MAMMO BI INCL CAD: CPT

## 2021-09-28 PROCEDURE — 77063 BREAST TOMOSYNTHESIS BI: CPT

## 2021-09-30 DIAGNOSIS — K21.9 GASTROESOPHAGEAL REFLUX DISEASE: ICD-10-CM

## 2021-09-30 RX ORDER — PANTOPRAZOLE SODIUM 40 MG/1
40 TABLET, DELAYED RELEASE ORAL DAILY
Qty: 90 TABLET | Refills: 3 | Status: SHIPPED | OUTPATIENT
Start: 2021-09-30

## 2021-10-12 ENCOUNTER — TELEPHONE (OUTPATIENT)
Dept: ADMINISTRATIVE | Facility: OTHER | Age: 56
End: 2021-10-12

## 2021-10-12 ENCOUNTER — OFFICE VISIT (OUTPATIENT)
Dept: FAMILY MEDICINE CLINIC | Facility: CLINIC | Age: 56
End: 2021-10-12
Payer: COMMERCIAL

## 2021-10-12 VITALS
HEART RATE: 84 BPM | BODY MASS INDEX: 20.4 KG/M2 | TEMPERATURE: 96.7 F | RESPIRATION RATE: 17 BRPM | HEIGHT: 59 IN | WEIGHT: 101.2 LBS | OXYGEN SATURATION: 98 % | DIASTOLIC BLOOD PRESSURE: 70 MMHG | SYSTOLIC BLOOD PRESSURE: 100 MMHG

## 2021-10-12 DIAGNOSIS — Z23 IMMUNIZATION DUE: ICD-10-CM

## 2021-10-12 DIAGNOSIS — Z12.11 SCREENING FOR COLORECTAL CANCER: Primary | ICD-10-CM

## 2021-10-12 DIAGNOSIS — Z12.12 SCREENING FOR COLORECTAL CANCER: Primary | ICD-10-CM

## 2021-10-12 PROCEDURE — 99214 OFFICE O/P EST MOD 30 MIN: CPT | Performed by: NURSE PRACTITIONER

## 2021-10-12 PROCEDURE — 90471 IMMUNIZATION ADMIN: CPT

## 2021-10-12 PROCEDURE — 90682 RIV4 VACC RECOMBINANT DNA IM: CPT

## 2021-10-30 ENCOUNTER — HOSPITAL ENCOUNTER (EMERGENCY)
Facility: HOSPITAL | Age: 56
Discharge: HOME | End: 2021-10-30
Attending: EMERGENCY MEDICINE
Payer: COMMERCIAL

## 2021-10-30 VITALS
HEART RATE: 89 BPM | SYSTOLIC BLOOD PRESSURE: 131 MMHG | BODY MASS INDEX: 20.04 KG/M2 | WEIGHT: 99.21 LBS | TEMPERATURE: 98 F | OXYGEN SATURATION: 100 % | DIASTOLIC BLOOD PRESSURE: 87 MMHG | RESPIRATION RATE: 16 BRPM

## 2021-10-30 DIAGNOSIS — Z20.822 ENCOUNTER FOR LABORATORY TESTING FOR COVID-19 VIRUS: ICD-10-CM

## 2021-10-30 DIAGNOSIS — J06.9 URI (UPPER RESPIRATORY INFECTION): Primary | ICD-10-CM

## 2021-10-30 LAB
FLUAV RNA RESP QL NAA+PROBE: NEGATIVE
FLUBV RNA RESP QL NAA+PROBE: NEGATIVE
RSV RNA RESP QL NAA+PROBE: NEGATIVE
SARS-COV-2 RNA RESP QL NAA+PROBE: NEGATIVE

## 2021-10-30 PROCEDURE — 99282 EMERGENCY DEPT VISIT SF MDM: CPT | Performed by: EMERGENCY MEDICINE

## 2021-10-30 PROCEDURE — 99283 EMERGENCY DEPT VISIT LOW MDM: CPT

## 2021-10-30 PROCEDURE — 0241U HB NFCT DS VIR RESP RNA 4 TRGT: CPT | Performed by: EMERGENCY MEDICINE

## 2021-11-03 ENCOUNTER — OFFICE VISIT (OUTPATIENT)
Dept: OBGYN CLINIC | Facility: CLINIC | Age: 56
End: 2021-11-03
Payer: COMMERCIAL

## 2021-11-03 VITALS — BODY MASS INDEX: 19.83 KG/M2 | DIASTOLIC BLOOD PRESSURE: 78 MMHG | SYSTOLIC BLOOD PRESSURE: 120 MMHG | WEIGHT: 98.2 LBS

## 2021-11-03 DIAGNOSIS — Z11.3 SCREEN FOR STD (SEXUALLY TRANSMITTED DISEASE): ICD-10-CM

## 2021-11-03 DIAGNOSIS — N76.0 BV (BACTERIAL VAGINOSIS): ICD-10-CM

## 2021-11-03 DIAGNOSIS — B96.89 BV (BACTERIAL VAGINOSIS): ICD-10-CM

## 2021-11-03 PROBLEM — Z12.31 ENCOUNTER FOR SCREENING MAMMOGRAM FOR BREAST CANCER: Status: RESOLVED | Noted: 2018-08-16 | Resolved: 2021-11-03

## 2021-11-03 PROBLEM — Z01.419 ENCNTR FOR GYN EXAM (GENERAL) (ROUTINE) W/O ABN FINDINGS: Status: RESOLVED | Noted: 2019-08-28 | Resolved: 2021-11-03

## 2021-11-03 PROBLEM — Z41.1 ENCOUNTER FOR COSMETIC SURGERY: Status: RESOLVED | Noted: 2020-02-26 | Resolved: 2021-11-03

## 2021-11-03 PROBLEM — N94.10 DYSPAREUNIA, FEMALE: Status: RESOLVED | Noted: 2018-08-16 | Resolved: 2021-11-03

## 2021-11-03 PROBLEM — Z01.411 ENCNTR FOR GYN EXAM (GENERAL) (ROUTINE) W ABNORMAL FINDINGS: Status: RESOLVED | Noted: 2018-08-16 | Resolved: 2021-11-03

## 2021-11-03 PROCEDURE — 99213 OFFICE O/P EST LOW 20 MIN: CPT | Performed by: PHYSICIAN ASSISTANT

## 2021-11-03 PROCEDURE — 87491 CHLMYD TRACH DNA AMP PROBE: CPT | Performed by: PHYSICIAN ASSISTANT

## 2021-11-03 PROCEDURE — 87591 N.GONORRHOEAE DNA AMP PROB: CPT | Performed by: PHYSICIAN ASSISTANT

## 2021-11-03 RX ORDER — CLINDAMYCIN PHOSPHATE 20 MG/G
1 CREAM VAGINAL
Qty: 40 G | Refills: 0 | Status: SHIPPED | OUTPATIENT
Start: 2021-11-03 | End: 2022-01-12 | Stop reason: ALTCHOICE

## 2021-11-06 LAB
C TRACH DNA SPEC QL NAA+PROBE: NEGATIVE
N GONORRHOEA DNA SPEC QL NAA+PROBE: NEGATIVE

## 2021-12-15 ENCOUNTER — ANNUAL EXAM (OUTPATIENT)
Dept: OBGYN CLINIC | Facility: CLINIC | Age: 56
End: 2021-12-15
Payer: COMMERCIAL

## 2021-12-15 VITALS
BODY MASS INDEX: 16.6 KG/M2 | HEIGHT: 65 IN | SYSTOLIC BLOOD PRESSURE: 110 MMHG | DIASTOLIC BLOOD PRESSURE: 78 MMHG | WEIGHT: 99.6 LBS

## 2021-12-15 DIAGNOSIS — Z01.419 ENCNTR FOR GYN EXAM (GENERAL) (ROUTINE) W/O ABN FINDINGS: Primary | ICD-10-CM

## 2021-12-15 DIAGNOSIS — Z12.31 ENCOUNTER FOR SCREENING MAMMOGRAM FOR MALIGNANT NEOPLASM OF BREAST: ICD-10-CM

## 2021-12-15 PROCEDURE — 99396 PREV VISIT EST AGE 40-64: CPT | Performed by: PHYSICIAN ASSISTANT

## 2022-01-11 ENCOUNTER — TELEPHONE (OUTPATIENT)
Dept: NEUROLOGY | Facility: CLINIC | Age: 57
End: 2022-01-11

## 2022-01-12 ENCOUNTER — OFFICE VISIT (OUTPATIENT)
Dept: FAMILY MEDICINE CLINIC | Facility: CLINIC | Age: 57
End: 2022-01-12
Payer: COMMERCIAL

## 2022-01-12 VITALS
DIASTOLIC BLOOD PRESSURE: 70 MMHG | HEART RATE: 80 BPM | HEIGHT: 59 IN | OXYGEN SATURATION: 99 % | SYSTOLIC BLOOD PRESSURE: 110 MMHG | TEMPERATURE: 96 F | BODY MASS INDEX: 20.04 KG/M2 | WEIGHT: 99.4 LBS | RESPIRATION RATE: 18 BRPM

## 2022-01-12 DIAGNOSIS — Z00.00 ANNUAL PHYSICAL EXAM: Primary | ICD-10-CM

## 2022-01-12 DIAGNOSIS — R42 DIZZINESS AND GIDDINESS: ICD-10-CM

## 2022-01-12 PROCEDURE — 99396 PREV VISIT EST AGE 40-64: CPT | Performed by: NURSE PRACTITIONER

## 2022-01-12 NOTE — PROGRESS NOTES
[address]    NAME: John Tipton  AGE: 61 y o  SEX: female  : 1965     DATE: 2022     Assessment and Plan:     Problem List Items Addressed This Visit        Other    Dizziness and giddiness    Relevant Orders    CBC and differential    HEMOGLOBIN A1C W/ EAG ESTIMATION    Lipid Panel with Direct LDL reflex    Comprehensive metabolic panel      Other Visit Diagnoses     Annual physical exam    -  Primary    Relevant Orders    CBC and differential    HEMOGLOBIN A1C W/ EAG ESTIMATION    Lipid Panel with Direct LDL reflex    Comprehensive metabolic panel          Immunizations and preventive care screenings were discussed with patient today  Appropriate education was printed on patient's after visit summary  Counseling:  Alcohol/drug use: discussed moderation in alcohol intake, the recommendations for healthy alcohol use, and avoidance of illicit drug use  Dental Health: discussed importance of regular tooth brushing, flossing, and dental visits  Injury prevention: discussed safety/seat belts, safety helmets, smoke detectors, carbon dioxide detectors, and smoking near bedding or upholstery  Sexual health: discussed sexually transmitted diseases, partner selection, use of condoms, avoidance of unintended pregnancy, and contraceptive alternatives  · Exercise: the importance of regular exercise/physical activity was discussed  Recommend exercise 3-5 times per week for at least 30 minutes  Return in about 1 year (around 2023) for Annual physical      Chief Complaint:     Chief Complaint   Patient presents with    Physical Exam      History of Present Illness:     Adult Annual Physical   Patient here for a comprehensive physical exam  The patient reports problems - still with vertigo    Seeing neurology on Friday   Getting more since the vaccine   Having ringing in ear     Still present   Saw ENt and found nothing     Diet and Physical Activity  · Diet/Nutrition: well balanced diet and consuming 3-5 servings of fruits/vegetables daily  · Exercise: no formal exercise  Depression Screening  PHQ-2/9 Depression Screening         General Health  · Sleep: sleeps well  · Hearing: normal - bilateral   · Vision: wears glasses  · Dental: regular dental visits  /GYN Health  · Patient is: postmenopausal  · Last menstrual period: had hysterectomy at 44   ·      Review of Systems:     Review of Systems   Constitutional: Negative for activity change, appetite change, chills and fever  HENT: Negative for congestion, ear pain, sinus pressure and sore throat  Eyes: Negative for pain and visual disturbance  Respiratory: Negative for cough, chest tightness and shortness of breath  Cardiovascular: Negative for chest pain and palpitations  Gastrointestinal: Negative for abdominal pain and vomiting  Genitourinary: Negative for dysuria, hematuria and urgency  Musculoskeletal: Negative for arthralgias and back pain  Skin: Negative for color change and rash  Neurological: Positive for dizziness  Negative for seizures, syncope, weakness, light-headedness, numbness and headaches  Psychiatric/Behavioral: The patient is not nervous/anxious  All other systems reviewed and are negative       Past Medical History:     Past Medical History:   Diagnosis Date    Anxiety     Cervical radiculopathy     Cervical spinal stenosis     Depression     GERD (gastroesophageal reflux disease)     Hiatal hernia     History of transfusion 1985    Myofascial pain syndrome     Polyarthralgia     TIA (transient ischemic attack)     Vertigo       Past Surgical History:     Past Surgical History:   Procedure Laterality Date    CARPAL TUNNEL RELEASE       SECTION      CHOLECYSTECTOMY      COLONOSCOPY      ECTOPIC PREGNANCY SURGERY      HYSTERECTOMY      Total unilateral removal tube and ovary  LAPAROSCOPY      OOPHORECTOMY      has one ovary    IL EXCIS PRIMARY GANGLION WRIST Left 9/22/2020    Procedure: EXCISION GANGLION CYST;  Surgeon: Jerone Pallas, MD;  Location:  MAIN OR;  Service: Plastics    IL REVISION OF LOWER EYELID Bilateral 2/26/2020    Procedure: BLEPHAROPLASTY LOWER;  Surgeon: Jerone Pallas, MD;  Location: [address] [place] [address] MAIN OR;  Service: Plastics    PUBOVAGINAL SLING      REPAIR RECTOCELE      TONSILECTOMY AND ADNOIDECTOMY      TONSILLECTOMY        Social History:     Social History     Socioeconomic History    Marital status: /Civil Union     Spouse name: None    Number of children: None    Years of education: None    Highest education level: None   Occupational History    None   Tobacco Use    Smoking status: Current Some Day Smoker     Packs/day: 0 25    Smokeless tobacco: Never Used    Tobacco comment: using patch and gum   Vaping Use    Vaping Use: Never used   Substance and Sexual Activity    Alcohol use: No    Drug use: Never    Sexual activity: Yes     Partners: Male     Comment:  x 17 years   Other Topics Concern    None   Social History Narrative    None     Social Determinants of Health     Financial Resource Strain: Not on file   Food Insecurity: Not on file   Transportation Needs: Not on file   Physical Activity: Not on file   Stress: Not on file   Social Connections: Not on file   Intimate Partner Violence: Not on file   Housing Stability: Not on file      Family History:     Family History   Problem Relation Age of Onset    Asthma Mother     Hypertension Father     Ulcers Brother     HIV Brother     Thyroid disease Other     Stroke Other     Hepatitis Sister         C and B    No Known Problems Daughter     No Known Problems Son     Other Maternal Grandmother         pre-diabetic    No Known Problems Maternal Grandfather     No Known Problems Paternal Grandmother     Diabetes Paternal Grandfather     No Known Problems Maternal Aunt     No Known Problems Maternal Aunt     No Known Problems Maternal Aunt     No Known Problems Maternal Aunt     No Known Problems Maternal Aunt     No Known Problems Maternal Aunt     No Known Problems Paternal Aunt     No Known Problems Paternal Aunt       Current Medications:     Current Outpatient Medications   Medication Sig Dispense Refill    busPIRone (BUSPAR) 5 mg tablet Currently 1 tab daily   May bump up if needed to twice a day 60 tablet 5    fluticasone (FLONASE) 50 mcg/act nasal spray 1 spray into each nostril daily 16 g 0    hydrOXYzine HCL (ATARAX) 25 mg tablet Take 1 tablet (25 mg total) by mouth every 8 (eight) hours as needed for itching 20 tablet 0    ibuprofen (MOTRIN) 600 mg tablet Take 1 tablet (600 mg total) by mouth every 6 (six) hours as needed for mild pain or moderate pain 30 tablet 0    loratadine (CLARITIN) 10 mg tablet Take 10 mg by mouth daily      meclizine (ANTIVERT) 25 mg tablet 1 tablet every 8 (eight) hours as needed       nicotine (NICODERM CQ) 21 mg/24 hr TD 24 hr patch Place 1 patch on the skin every 24 hours 28 patch 0    nicotine polacrilex (NICORETTE) 2 mg gum Chew 1 each (2 mg total) as needed for smoking cessation 100 each 0    pantoprazole (PROTONIX) 40 mg tablet Take 1 tablet (40 mg total) by mouth daily 90 tablet 3     No current facility-administered medications for this visit  Allergies: Allergies   Allergen Reactions    Gabapentin Hives    Escitalopram Drowsiness    Fluoxetine      Ringing in ears    Latex     Sertraline GI Intolerance    Varenicline GI Intolerance      Physical Exam:     /70 (BP Location: Left arm, Patient Position: Sitting, Cuff Size: Standard)   Pulse 80   Temp (!) 96 °F (35 6 °C) (Temporal)   Resp 18   Ht 4' 11" (1 499 m)   Wt 45 1 kg (99 lb 6 4 oz)   LMP  (LMP Unknown)   SpO2 99%   BMI 20 08 kg/m²     Physical Exam  Vitals and nursing note reviewed     Constitutional:       General: She is not in acute distress  Appearance: Normal appearance  She is well-developed  HENT:      Head: Normocephalic and atraumatic  Right Ear: Tympanic membrane, ear canal and external ear normal       Left Ear: Tympanic membrane, ear canal and external ear normal    Eyes:      Conjunctiva/sclera: Conjunctivae normal       Pupils: Pupils are equal, round, and reactive to light  Cardiovascular:      Rate and Rhythm: Normal rate and regular rhythm  Pulses: Normal pulses  Heart sounds: Normal heart sounds  No murmur heard  Pulmonary:      Effort: Pulmonary effort is normal  No respiratory distress  Breath sounds: Normal breath sounds  Abdominal:      General: Bowel sounds are normal       Palpations: Abdomen is soft  Tenderness: There is no abdominal tenderness  Musculoskeletal:         General: Normal range of motion  Cervical back: Normal range of motion and neck supple  Skin:     General: Skin is warm and dry  Neurological:      Mental Status: She is alert and oriented to person, place, and time  Psychiatric:         Mood and Affect: Mood normal          Behavior: Behavior normal          Thought Content:  Thought content normal          Judgment: Judgment normal           THOM Real  [address]

## 2022-01-14 ENCOUNTER — TELEMEDICINE (OUTPATIENT)
Dept: NEUROLOGY | Facility: CLINIC | Age: 57
End: 2022-01-14
Payer: COMMERCIAL

## 2022-01-14 ENCOUNTER — TELEPHONE (OUTPATIENT)
Dept: NEUROLOGY | Facility: CLINIC | Age: 57
End: 2022-01-14

## 2022-01-14 DIAGNOSIS — R42 PERSISTENT POSTURAL-PERCEPTUAL DIZZINESS: Primary | ICD-10-CM

## 2022-01-14 DIAGNOSIS — M54.12 CERVICAL RADICULOPATHY: ICD-10-CM

## 2022-01-14 DIAGNOSIS — R42 DIZZINESS AND GIDDINESS: ICD-10-CM

## 2022-01-14 DIAGNOSIS — F41.9 ANXIETY: ICD-10-CM

## 2022-01-14 DIAGNOSIS — G45.9 TIA (TRANSIENT ISCHEMIC ATTACK): ICD-10-CM

## 2022-01-14 DIAGNOSIS — R42 EPISODIC LIGHTHEADEDNESS: ICD-10-CM

## 2022-01-14 DIAGNOSIS — M48.02 CERVICAL SPINAL STENOSIS: ICD-10-CM

## 2022-01-14 PROBLEM — H81.8X9 PERSISTENT POSTURAL-PERCEPTUAL DIZZINESS: Status: ACTIVE | Noted: 2022-01-14

## 2022-01-14 PROCEDURE — 99204 OFFICE O/P NEW MOD 45 MIN: CPT | Performed by: PSYCHIATRY & NEUROLOGY

## 2022-01-14 RX ORDER — VENLAFAXINE HYDROCHLORIDE 37.5 MG/1
37.5 TABLET, EXTENDED RELEASE ORAL
Qty: 60 TABLET | Refills: 3 | Status: SHIPPED | OUTPATIENT
Start: 2022-01-14

## 2022-01-14 NOTE — PROGRESS NOTES
[facility]  PATIENT:  Cristian Vigil  MRN:  [id number]  :  1965  DATE OF SERVICE:  2022  Virtual Regular Visit    Verification of patient location:    Patient is located in the following state in which I hold an active license [place]      Assessment/Plan:    Problem List Items Addressed This Visit        Cardiovascular and Mediastinum    TIA (transient ischemic attack)    Relevant Orders    VAS carotid complete study       Nervous and Auditory    Cervical radiculopathy    Relevant Orders    CT spine cervical wo contrast       Other    Cervical spinal stenosis    Relevant Orders    Ambulatory Referral to Physical Therapy    Ambulatory Referral to Occupational Therapy    CT spine cervical wo contrast    Anxiety    Relevant Medications    venlafaxine 37 5 mg 24 hr tablet    Other Relevant Orders    Ambulatory Referral to Physical Therapy    Ambulatory Referral to Occupational Therapy    Dizziness and giddiness    Relevant Orders    Ambulatory Referral to Physical Therapy    Ambulatory Referral to Occupational Therapy    CT spine cervical wo contrast    Persistent postural-perceptual dizziness - Primary    Relevant Medications    venlafaxine 37 5 mg 24 hr tablet    Other Relevant Orders    Ambulatory Referral to Physical Therapy    Ambulatory Referral to Occupational Therapy      Other Visit Diagnoses     Episodic lightheadedness        Relevant Medications    venlafaxine 37 5 mg 24 hr tablet    Other Relevant Orders    VAS carotid complete study               Reason for visit is a NEW patient visit   Chief Complaint   Patient presents with    Virtual Regular Visit    Dizziness    Tinnitus        Encounter provider Rock Hurley MD    Provider located at [address] [place] [id number]      Recent Visits  Date Type Provider Dept   22 Office Visit Maude Monsalve, [address] 01/11/22 Telephone Hawa Quintana [facility]   Showing recent visits within past 7 days and meeting all other requirements  Today's Visits  Date Type Provider Dept   01/14/22 Telephone Steve Mohinder [facility]   01/14/22 Telemedicine Shari Whelan MD [facility]   Showing today's visits and meeting all other requirements  Future Appointments  No visits were found meeting these conditions  Showing future appointments within next 150 days and meeting all other requirements       The patient was identified by name and date of birth  Claudell Ice was informed that this is a telemedicine visit and that the visit is being conducted through telephone and patient was informed this is a secure, HIPAA-complaint platform  She agrees to proceed     My office door was closed  No one else was in the room  She acknowledged consent and understanding of privacy and security of the video platform  The patient has agreed to participate and understands they can discontinue the visit at any time  Patient is aware this is a billable service  Subjective  Claudell Ice is a 61 y o  female with dizzinss    HPI   Mrs Wood Avendaño is a 62 yo female who was referred to  [address] [place] [address] for evaluation of dizziness  Patient has known history of vertigo, cervical spinal stenosis and TIA, history of transfusion  MRI brain was completed on 2/18/2020 for dizziness and is unremarkable with no pathology in posterior fossa or IAC described;     Patient described her symptoms started in 2012 at that time she developed benign positional vertigo requiring hospitalization treatment  Since that time patient experiencing 2 different types of dizziness including episodic lightheadedness and bouts of vertigo    Patient stated  that turning her head quickly can make her feeling dizzy especially when she turns her head to the right side, with dizziness last for 30 seconds  Patient also described right-sided tinnitus with difficulty sleeping on the right side due to dizziness  Patient did have evaluation by ENT team on several times with no consideration of inner ear issues described, mixed sensorineural hearing loss has been reported  Patient also have you starting tube dysfunction requiring methylprednisolone treatment at that time  Patient has established care with [facility] with last office visit wa in September 2018  Patient was previously evaluated by Dr Buzz Rosales, evaluation was provided for peripheral nerve dysfunction as several regimens has been tried including nortriptyline, gabapentin, duloxetine and Topamax  Due to different degree of intolerance medication were discontinued patient was advised to follow with pain management  Patient described event which took place in May of 2016, she was hospitalized in [facility] when she developed right upper extremity pain with bilateral lower extremity weakness she believes it was due to panic attack about transient ischemic attack has been considered requiring evaluation with brain MRI and CT angiogram head  Patient had completed evaluation by in nose and throat specialist multiple physical therapies has been provided with no significant relief in her episodic lightheadedness and bouts of vertigo has been noted  Once patient developed vertigo patient developed nausea and balance dysfunction for the whole 24 hours with last bout was 2 weeks ago  Laurence Mauro  Mrs Luis Enrique García was referred to [facility] multiple [address] for evaluation of episodic lightheadedness and bouts of vertigo, with initial event took place in 2012    Patient had tried multiple regimens of physical therapy and she had multiple evaluation by ENT team with no significant improvement has been described and no significant pathology other than mixed hearing loss/tinnitus/is start him tube dysfunction has been reported  Patient has Ceola Lathe stepping test on the left which is the only findings reported by ENT team several years ago  Patient has longstanding history of anxiety/panic attacks/depression with multiple SSRIs has been tried for the years:  - vestibular therapy with vision therapy will be advised in addition to initiating venlafaxine to help increase level of serotonin in brain parenchyma consideration for perceptual positional dizziness in the light of remote history of vertigo and ongoing anxiety and depression  Patient had tried multiple regimens of SSRI for the years, with panic attack described in May 2016 when patient likely had TIAs versus cervical radiculopathy versus panic attack; Venl;afaxine 37 5 mg advised;   - brain MRI and CT angiogram was personally reviewed, no intracranial pathology has been noted  - patient has known history of right arm sensory dysfunction with pain, cervical radiculopathy will be considered in the light of epidural injections provided several years ago  Cervicogenic dizziness also high on differential as some of her episodes of lightheadedness triggered by turning her head to the side; patient will be advised carotid Doppler ultrasound from this perspective as well question vertebrobasilar insufficiency in the light of longstanding smoking history  - patient is to follow with Neurology advanced practitioner team within 2 months  MRI brain and CTA was also completed in May 2016 in outside hospital;        Past Medical History:   Diagnosis Date    Anxiety     Cervical radiculopathy     Cervical spinal stenosis     Depression     GERD (gastroesophageal reflux disease)     Hiatal hernia     History of transfusion     Myofascial pain syndrome     Polyarthralgia     TIA (transient ischemic attack)     Vertigo        Past Surgical History:   Procedure Laterality Date    CARPAL TUNNEL RELEASE       SECTION      CHOLECYSTECTOMY      COLONOSCOPY  ECTOPIC PREGNANCY SURGERY      HYSTERECTOMY      Total unilateral removal tube and ovary    LAPAROSCOPY      OOPHORECTOMY      has one ovary    AZ EXCIS PRIMARY GANGLION WRIST Left 9/22/2020    Procedure: EXCISION GANGLION CYST;  Surgeon: Na Langley MD;  Location: [facility] MAIN OR;  Service: Plastics    AZ REVISION OF LOWER EYELID Bilateral 2/26/2020    Procedure: BLEPHAROPLASTY LOWER;  Surgeon: Na Langley MD;  Location: [facility] MAIN OR;  Service: Plastics    PUBOVAGINAL SLING      REPAIR RECTOCELE      TONSILECTOMY AND ADNOIDECTOMY      TONSILLECTOMY         Current Outpatient Medications   Medication Sig Dispense Refill    busPIRone (BUSPAR) 5 mg tablet Currently 1 tab daily   May bump up if needed to twice a day 60 tablet 5    fluticasone (FLONASE) 50 mcg/act nasal spray 1 spray into each nostril daily 16 g 0    hydrOXYzine HCL (ATARAX) 25 mg tablet Take 1 tablet (25 mg total) by mouth every 8 (eight) hours as needed for itching 20 tablet 0    ibuprofen (MOTRIN) 600 mg tablet Take 1 tablet (600 mg total) by mouth every 6 (six) hours as needed for mild pain or moderate pain 30 tablet 0    loratadine (CLARITIN) 10 mg tablet Take 10 mg by mouth daily      meclizine (ANTIVERT) 25 mg tablet 1 tablet every 8 (eight) hours as needed       nicotine (NICODERM CQ) 21 mg/24 hr TD 24 hr patch Place 1 patch on the skin every 24 hours 28 patch 0    nicotine polacrilex (NICORETTE) 2 mg gum Chew 1 each (2 mg total) as needed for smoking cessation 100 each 0    pantoprazole (PROTONIX) 40 mg tablet Take 1 tablet (40 mg total) by mouth daily 90 tablet 3    venlafaxine 37 5 mg 24 hr tablet Take 1 tablet (37 5 mg total) by mouth daily with breakfast For 1 week, then 2 tabs a day 60 tablet 3     No current facility-administered medications for this visit          Allergies   Allergen Reactions    Gabapentin Hives    Escitalopram Drowsiness    Fluoxetine      Ringing in ears    Latex     Sertraline GI Intolerance    Varenicline GI Intolerance       Review of Systems   Constitutional: Negative  HENT: Negative  Respiratory: Negative  Cardiovascular: Negative  Gastrointestinal: Negative  Endocrine: Negative  Genitourinary: Negative  Musculoskeletal: Negative  Skin: Negative  Allergic/Immunologic: Negative  Neurological: Positive for dizziness, light-headedness and numbness  Hematological: Negative  Psychiatric/Behavioral: Negative  All other systems reviewed and are negative  Video Exam    There were no vitals filed for this visit  Physical Exam     I spent 40 minutes with patient today in which greater than 50% of the time was spent in counseling/coordination of care regarding Pathophysiology of underlying neurological dysfunction    VIRTUAL VISIT DISCLAIMER      Siomara Menard verbally agrees to participate in [employer]  Pt is aware that [employer] could be limited without vital signs or the ability to perform a full hands-on physical Alida Macias understands she or the provider may request at any time to terminate the video visit and request the patient to seek care or treatment in person

## 2022-01-14 NOTE — TELEPHONE ENCOUNTER
Patient called in because unable to start virtual   Informed MA and doctor will send link via cell phone  Patient had hung up at this point  Called patient back and got voicemail  Left detailed voicemail that doctor would send link and to click and call back with any questions

## 2022-01-14 NOTE — PROGRESS NOTES
Patient ID: John Tipton is a 61 y o  female  Assessment/Plan:    No problem-specific Assessment & Plan notes found for this encounter  {Assess/PlanSmartLinks:08636}       Subjective:    HPI    {[facility] HPI texts:08388}    {Common ambulatory SmartLinks:88687}         Objective:    not currently breastfeeding  Physical Exam    Neurological Exam      ROS:    Review of Systems   Constitutional: Negative  Negative for appetite change and fever  HENT: Positive for tinnitus (RT ear)  Negative for hearing loss, trouble swallowing and voice change  Eyes: Negative  Negative for photophobia and pain  Respiratory: Negative  Negative for shortness of breath  Cardiovascular: Negative  Negative for palpitations  Gastrointestinal: Negative  Negative for nausea and vomiting  Endocrine: Negative  Negative for cold intolerance  Genitourinary: Negative  Negative for dysuria, frequency and urgency  Musculoskeletal: Negative  Negative for myalgias and neck pain  Skin: Negative  Negative for rash  Neurological: Positive for dizziness  Negative for tremors, seizures, syncope, facial asymmetry, speech difficulty, weakness, light-headedness, numbness and headaches  Hematological: Negative  Does not bruise/bleed easily  Psychiatric/Behavioral: Negative  Negative for confusion, hallucinations and sleep disturbance

## 2022-02-04 ENCOUNTER — APPOINTMENT (OUTPATIENT)
Dept: LAB | Facility: HOSPITAL | Age: 57
End: 2022-02-04
Payer: COMMERCIAL

## 2022-02-04 DIAGNOSIS — R42 DIZZINESS AND GIDDINESS: ICD-10-CM

## 2022-02-04 DIAGNOSIS — Z00.00 ANNUAL PHYSICAL EXAM: ICD-10-CM

## 2022-02-04 LAB
ALBUMIN SERPL BCP-MCNC: 4.4 G/DL (ref 3–5.2)
ALP SERPL-CCNC: 87 U/L (ref 43–122)
ALT SERPL W P-5'-P-CCNC: 20 U/L
ANION GAP SERPL CALCULATED.3IONS-SCNC: 5 MMOL/L (ref 5–14)
AST SERPL W P-5'-P-CCNC: 30 U/L (ref 14–36)
BASOPHILS # BLD AUTO: 0 THOUSANDS/ΜL (ref 0–0.1)
BASOPHILS NFR BLD AUTO: 0 % (ref 0–1)
BILIRUB SERPL-MCNC: 0.29 MG/DL
BUN SERPL-MCNC: 12 MG/DL (ref 5–25)
CALCIUM SERPL-MCNC: 9.2 MG/DL (ref 8.4–10.2)
CHLORIDE SERPL-SCNC: 105 MMOL/L (ref 97–108)
CHOLEST SERPL-MCNC: 192 MG/DL
CO2 SERPL-SCNC: 29 MMOL/L (ref 22–30)
CREAT SERPL-MCNC: 0.64 MG/DL (ref 0.6–1.2)
EOSINOPHIL # BLD AUTO: 0.1 THOUSAND/ΜL (ref 0–0.4)
EOSINOPHIL NFR BLD AUTO: 1 % (ref 0–6)
ERYTHROCYTE [DISTWIDTH] IN BLOOD BY AUTOMATED COUNT: 13.5 %
EST. AVERAGE GLUCOSE BLD GHB EST-MCNC: 117 MG/DL
GFR SERPL CREATININE-BSD FRML MDRD: 99 ML/MIN/1.73SQ M
GLUCOSE P FAST SERPL-MCNC: 90 MG/DL (ref 70–99)
HBA1C MFR BLD: 5.7 %
HCT VFR BLD AUTO: 42 % (ref 36–46)
HDLC SERPL-MCNC: 57 MG/DL
HGB BLD-MCNC: 13.8 G/DL (ref 12–16)
LDLC SERPL CALC-MCNC: 116 MG/DL
LYMPHOCYTES # BLD AUTO: 1.7 THOUSANDS/ΜL (ref 0.5–4)
LYMPHOCYTES NFR BLD AUTO: 27 % (ref 25–45)
MCH RBC QN AUTO: 30 PG (ref 26–34)
MCHC RBC AUTO-ENTMCNC: 32.9 G/DL (ref 31–36)
MCV RBC AUTO: 91 FL (ref 80–100)
MONOCYTES # BLD AUTO: 0.5 THOUSAND/ΜL (ref 0.2–0.9)
MONOCYTES NFR BLD AUTO: 7 % (ref 1–10)
NEUTROPHILS # BLD AUTO: 4.2 THOUSANDS/ΜL (ref 1.8–7.8)
NEUTS SEG NFR BLD AUTO: 65 % (ref 45–65)
PLATELET # BLD AUTO: 191 THOUSANDS/UL (ref 150–450)
PMV BLD AUTO: 8.4 FL (ref 8.9–12.7)
POTASSIUM SERPL-SCNC: 4 MMOL/L (ref 3.6–5)
PROT SERPL-MCNC: 7.9 G/DL (ref 5.9–8.4)
RBC # BLD AUTO: 4.6 MILLION/UL (ref 4–5.2)
SODIUM SERPL-SCNC: 139 MMOL/L (ref 137–147)
TRIGL SERPL-MCNC: 93 MG/DL
WBC # BLD AUTO: 6.5 THOUSAND/UL (ref 4.5–11)

## 2022-02-04 PROCEDURE — 85025 COMPLETE CBC W/AUTO DIFF WBC: CPT

## 2022-02-04 PROCEDURE — 36415 COLL VENOUS BLD VENIPUNCTURE: CPT

## 2022-02-04 PROCEDURE — 80061 LIPID PANEL: CPT

## 2022-02-04 PROCEDURE — 80053 COMPREHEN METABOLIC PANEL: CPT

## 2022-02-04 PROCEDURE — 83036 HEMOGLOBIN GLYCOSYLATED A1C: CPT

## 2022-02-07 ENCOUNTER — TELEPHONE (OUTPATIENT)
Dept: FAMILY MEDICINE CLINIC | Facility: CLINIC | Age: 57
End: 2022-02-07

## 2022-02-07 NOTE — TELEPHONE ENCOUNTER
----- Message from Fiorella Marion, [address] sent at 2/5/2022  2:37 PM EST -----  A1C up just a little to 5 7   Rest of labs ok

## 2022-09-30 ENCOUNTER — HOSPITAL ENCOUNTER (OUTPATIENT)
Dept: MAMMOGRAPHY | Facility: CLINIC | Age: 57
End: 2022-09-30
Payer: COMMERCIAL

## 2022-09-30 VITALS — HEIGHT: 59 IN | WEIGHT: 102 LBS | BODY MASS INDEX: 20.56 KG/M2

## 2022-09-30 DIAGNOSIS — Z12.31 ENCOUNTER FOR SCREENING MAMMOGRAM FOR MALIGNANT NEOPLASM OF BREAST: ICD-10-CM

## 2022-09-30 PROCEDURE — 77067 SCR MAMMO BI INCL CAD: CPT

## 2022-09-30 PROCEDURE — 77063 BREAST TOMOSYNTHESIS BI: CPT

## 2022-10-10 ENCOUNTER — OFFICE VISIT (OUTPATIENT)
Dept: FAMILY MEDICINE CLINIC | Facility: CLINIC | Age: 57
End: 2022-10-10
Payer: COMMERCIAL

## 2022-10-10 VITALS
HEART RATE: 67 BPM | BODY MASS INDEX: 20.6 KG/M2 | DIASTOLIC BLOOD PRESSURE: 70 MMHG | OXYGEN SATURATION: 98 % | TEMPERATURE: 97.6 F | WEIGHT: 102.2 LBS | HEIGHT: 59 IN | SYSTOLIC BLOOD PRESSURE: 118 MMHG

## 2022-10-10 DIAGNOSIS — F41.9 ANXIETY: ICD-10-CM

## 2022-10-10 DIAGNOSIS — R42 PERSISTENT POSTURAL-PERCEPTUAL DIZZINESS: ICD-10-CM

## 2022-10-10 DIAGNOSIS — Z23 ENCOUNTER FOR IMMUNIZATION: ICD-10-CM

## 2022-10-10 DIAGNOSIS — H81.10 BENIGN PAROXYSMAL POSITIONAL VERTIGO, UNSPECIFIED LATERALITY: Primary | ICD-10-CM

## 2022-10-10 PROCEDURE — 99213 OFFICE O/P EST LOW 20 MIN: CPT

## 2022-10-10 PROCEDURE — 90471 IMMUNIZATION ADMIN: CPT

## 2022-10-10 PROCEDURE — 90682 RIV4 VACC RECOMBINANT DNA IM: CPT

## 2022-10-10 RX ORDER — HYDROXYZINE HYDROCHLORIDE 25 MG/1
25 TABLET, FILM COATED ORAL EVERY 8 HOURS PRN
Qty: 20 TABLET | Refills: 0 | Status: SHIPPED | OUTPATIENT
Start: 2022-10-10

## 2022-10-10 NOTE — PROGRESS NOTES
Name: Tolu Wyatt      : 1965      MRN: [id number]  Encounter Provider: THOM Andrade  Encounter Date: 10/10/2022   Encounter department: [address] [place] [address]    Assessment & Plan     1  Benign paroxysmal positional vertigo, unspecified laterality  Assessment & Plan:  Currently follows with neurology   Well controlled on medications   Still has frequent flares when changes positions too quickly and looking at the light for extended periods   Denies recent falls but can become off balance         2  Persistent postural-perceptual dizziness  Assessment & Plan:  Associated with vertigo   Change positions slowly       3  Anxiety  Assessment & Plan:  Reordered hydroxyzine     Orders:  -     hydrOXYzine HCL (ATARAX) 25 mg tablet; Take 1 tablet (25 mg total) by mouth every 8 (eight) hours as needed for anxiety    4  Encounter for immunization  -     influenza vaccine, quadrivalent, recombinant, PF, 0 5 mL, for patients 18 yr+ (FLUBLOK)         Subjective      Here for follow up  FMLA paperwork to be renewed for vertigo  Still seeing neurology  Is compliant with medication regimen but still impacts daily life  Dizziness  This is a chronic problem  The current episode started more than 1 year ago  The problem occurs constantly  The problem has been waxing and waning  Associated symptoms include abdominal pain (During acute flares ) and nausea  Pertinent negatives include no arthralgias, chest pain, chills, coughing, fever, headaches, rash, sore throat or vomiting  Exacerbated by: looking up to the ceiling or quick movements  She has tried position changes and lying down (On medication regimen ) for the symptoms  Review of Systems   Constitutional: Negative for chills and fever  HENT: Positive for tinnitus  Negative for ear pain and sore throat  Eyes: Positive for photophobia  Negative for pain and visual disturbance     Respiratory: Negative for cough and shortness of breath  Cardiovascular: Negative for chest pain and palpitations  Gastrointestinal: Positive for abdominal pain (During acute flares ) and nausea  Negative for vomiting  Genitourinary: Negative for dysuria and hematuria  Musculoskeletal: Negative for arthralgias and back pain  Skin: Negative for color change and rash  Neurological: Positive for dizziness and light-headedness  Negative for seizures, syncope and headaches  Psychiatric/Behavioral: Negative for dysphoric mood  The patient is not nervous/anxious  All other systems reviewed and are negative  Current Outpatient Medications on File Prior to Visit   Medication Sig   • busPIRone (BUSPAR) 5 mg tablet Currently 1 tab daily   May bump up if needed to twice a day   • fluticasone (FLONASE) 50 mcg/act nasal spray 1 spray into each nostril daily   • ibuprofen (MOTRIN) 600 mg tablet Take 1 tablet (600 mg total) by mouth every 6 (six) hours as needed for mild pain or moderate pain   • loratadine (CLARITIN) 10 mg tablet Take 10 mg by mouth daily   • meclizine (ANTIVERT) 25 mg tablet 1 tablet every 8 (eight) hours as needed    • nicotine (NICODERM CQ) 21 mg/24 hr TD 24 hr patch Place 1 patch on the skin every 24 hours   • nicotine polacrilex (NICORETTE) 2 mg gum Chew 1 each (2 mg total) as needed for smoking cessation   • pantoprazole (PROTONIX) 40 mg tablet Take 1 tablet (40 mg total) by mouth daily   • venlafaxine 37 5 mg 24 hr tablet Take 1 tablet (37 5 mg total) by mouth daily with breakfast For 1 week, then 2 tabs a day   • [DISCONTINUED] hydrOXYzine HCL (ATARAX) 25 mg tablet Take 1 tablet (25 mg total) by mouth every 8 (eight) hours as needed for itching       Objective     /70 (BP Location: Left arm, Patient Position: Sitting, Cuff Size: Adult)   Pulse 67   Temp 97 6 °F (36 4 °C)   Ht 4' 11" (1 499 m)   Wt 46 4 kg (102 lb 3 2 oz)   LMP  (LMP Unknown)   SpO2 98%   BMI 20 64 kg/m²     Physical Exam  Vitals reviewed  Constitutional:       Appearance: Normal appearance  HENT:      Head: Normocephalic  Right Ear: Tympanic membrane, ear canal and external ear normal  There is no impacted cerumen  Left Ear: Tympanic membrane, ear canal and external ear normal  There is no impacted cerumen  Mouth/Throat:      Mouth: Mucous membranes are moist       Pharynx: Oropharynx is clear  Eyes:      Pupils: Pupils are equal, round, and reactive to light  Cardiovascular:      Rate and Rhythm: Normal rate and regular rhythm  Pulses: Normal pulses  Heart sounds: Normal heart sounds  Pulmonary:      Effort: Pulmonary effort is normal       Breath sounds: Normal breath sounds  Abdominal:      General: Abdomen is flat  There is no distension  Tenderness: There is no abdominal tenderness  Musculoskeletal:         General: Normal range of motion  Cervical back: Normal range of motion  Skin:     General: Skin is warm  Capillary Refill: Capillary refill takes less than 2 seconds  Neurological:      Mental Status: She is alert and oriented to person, place, and time  Mental status is at baseline  Psychiatric:         Mood and Affect: Mood normal          Thought Content:  Thought content normal        JanTHOM Morrison

## 2022-10-10 NOTE — ASSESSMENT & PLAN NOTE
Currently follows with neurology   Well controlled on medications   Still has frequent flares when changes positions too quickly and looking at the light for extended periods   Denies recent falls but can become off balance

## 2022-12-02 ENCOUNTER — HOSPITAL ENCOUNTER (EMERGENCY)
Facility: HOSPITAL | Age: 57
Discharge: HOME | End: 2022-12-02
Attending: EMERGENCY MEDICINE

## 2022-12-02 ENCOUNTER — APPOINTMENT (EMERGENCY)
Dept: NON INVASIVE DIAGNOSTICS | Facility: HOSPITAL | Age: 57
End: 2022-12-02

## 2022-12-02 VITALS
BODY MASS INDEX: 20.6 KG/M2 | SYSTOLIC BLOOD PRESSURE: 132 MMHG | WEIGHT: 102 LBS | RESPIRATION RATE: 18 BRPM | TEMPERATURE: 98.1 F | OXYGEN SATURATION: 100 % | HEART RATE: 98 BPM | DIASTOLIC BLOOD PRESSURE: 80 MMHG

## 2022-12-02 DIAGNOSIS — M79.605 LEG PAIN, INFERIOR, LEFT: Primary | ICD-10-CM

## 2022-12-02 RX ORDER — LIDOCAINE 50 MG/G
1 PATCH TOPICAL ONCE
Status: DISCONTINUED | OUTPATIENT
Start: 2022-12-02 | End: 2022-12-02 | Stop reason: HOSPADM

## 2022-12-02 RX ORDER — ACETAMINOPHEN 325 MG/1
650 TABLET ORAL ONCE
Status: COMPLETED | OUTPATIENT
Start: 2022-12-02 | End: 2022-12-02

## 2022-12-02 RX ADMIN — ACETAMINOPHEN 650 MG: 325 TABLET ORAL at 15:06

## 2022-12-02 RX ADMIN — LIDOCAINE 1 PATCH: 50 PATCH TOPICAL at 15:06

## 2022-12-02 NOTE — ED NOTES
Patty From [facility] is calling in Vascular tech to do the duplex study        Tori Freeman, HUMBERTO  12/02/22 7129

## 2022-12-02 NOTE — Clinical Note
Rosette Kayser was seen and treated in our emergency department on 12/2/2022  Diagnosis:     Jose Roberto White  may return to work on return date  She may return on this date: 12/03/2022         If you have any questions or concerns, please don't hesitate to call        Oziel Silver PA-C    ______________________________           _______________          _______________  Hospital Representative                              Date                                Time

## 2022-12-02 NOTE — ED PROVIDER NOTES
History  Chief Complaint   Patient presents with   • Leg Pain     L calf pain and tingling since yesterday     71-year-old female current everyday smoker presenting for evaluation of left lower leg swelling and pain  Patient reports yesterday at work she had a paresthesias beginning and reports she has the same sensation continuing today  Patient denies history of blood clots, long distance travel, recent surgeries, hemoptysis, chest pain, shortness of breath, exogenous estrogen  History provided by:  Patient   used: No    Leg Pain  Location:  Leg  Leg location:  L lower leg  Pain details:     Quality:  Tingling    Severity:  Moderate    Onset quality:  Gradual    Timing:  Constant    Progression:  Unchanged  Chronicity:  New  Dislocation: no    Prior injury to area:  No  Relieved by:  None tried  Worsened by:  Nothing  Ineffective treatments:  None tried  Associated symptoms: no fatigue and no fever        Prior to Admission Medications   Prescriptions Last Dose Informant Patient Reported?  Taking?   busPIRone (BUSPAR) 5 mg tablet   No No   Sig: Currently 1 tab daily   May bump up if needed to twice a day   fluticasone (FLONASE) 50 mcg/act nasal spray   No No   Si spray into each nostril daily   hydrOXYzine HCL (ATARAX) 25 mg tablet   No No   Sig: Take 1 tablet (25 mg total) by mouth every 8 (eight) hours as needed for anxiety   ibuprofen (MOTRIN) 600 mg tablet   No No   Sig: Take 1 tablet (600 mg total) by mouth every 6 (six) hours as needed for mild pain or moderate pain   loratadine (CLARITIN) 10 mg tablet   Yes No   Sig: Take 10 mg by mouth daily   meclizine (ANTIVERT) 25 mg tablet   Yes No   Si tablet every 8 (eight) hours as needed    nicotine (NICODERM CQ) 21 mg/24 hr TD 24 hr patch   No No   Sig: Place 1 patch on the skin every 24 hours   nicotine polacrilex (NICORETTE) 2 mg gum   No No   Sig: Chew 1 each (2 mg total) as needed for smoking cessation   pantoprazole (PROTONIX) 40 mg tablet   No No   Sig: Take 1 tablet (40 mg total) by mouth daily   venlafaxine 37 5 mg 24 hr tablet   No No   Sig: Take 1 tablet (37 5 mg total) by mouth daily with breakfast For 1 week, then 2 tabs a day      Facility-Administered Medications: None       Past Medical History:   Diagnosis Date   • Anxiety    • Cervical radiculopathy    • Cervical spinal stenosis    • Depression    • GERD (gastroesophageal reflux disease)    • Hiatal hernia    • History of transfusion    • Myofascial pain syndrome    • Polyarthralgia    • TIA (transient ischemic attack)    • Vertigo        Past Surgical History:   Procedure Laterality Date   • CARPAL TUNNEL RELEASE     •  SECTION     • CHOLECYSTECTOMY     • COLONOSCOPY     • ECTOPIC PREGNANCY SURGERY     • HYSTERECTOMY      Total unilateral removal tube and ovary   • LAPAROSCOPY     • OOPHORECTOMY      has one ovary   • TX EXCIS PRIMARY GANGLION WRIST Left 2020    Procedure: EXCISION GANGLION CYST;  Surgeon: Chela Cardoso MD;  Location: [facility] MAIN OR;  Service: Plastics   • TX REVISION OF LOWER EYELID Bilateral 2020    Procedure: BLEPHAROPLASTY LOWER;  Surgeon: Chela Cardoso MD;  Location: [facility] MAIN OR;  Service: Plastics   • PUBOVAGINAL SLING     • REPAIR RECTOCELE     • TONSILECTOMY AND ADNOIDECTOMY     • TONSILLECTOMY         Family History   Problem Relation Age of Onset   • Asthma Mother    • Hypertension Father    • Ulcers Brother    • HIV Brother    • Thyroid disease Other    • Stroke Other    • Hepatitis Sister         C and B   • No Known Problems Daughter    • No Known Problems Son    • Other Maternal Grandmother         pre-diabetic   • No Known Problems Maternal Grandfather    • No Known Problems Paternal Grandmother    • Diabetes Paternal Grandfather    • No Known Problems Maternal Aunt    • No Known Problems Maternal Aunt    • No Known Problems Maternal Aunt    • No Known Problems Maternal Aunt    • No Known Problems Maternal Aunt • No Known Problems Maternal Aunt    • No Known Problems Paternal Aunt    • No Known Problems Paternal Aunt      I have reviewed and agree with the history as documented  E-Cigarette/Vaping   • E-Cigarette Use Never User      E-Cigarette/Vaping Substances   • Nicotine No    • THC No    • CBD No    • Flavoring No    • Other No    • Unknown No      Social History     Tobacco Use   • Smoking status: Some Days     Packs/day: 0 25     Types: Cigarettes   • Smokeless tobacco: Never   • Tobacco comments:     using patch and gum   Vaping Use   • Vaping Use: Never used   Substance Use Topics   • Alcohol use: No   • Drug use: Never       Review of Systems   Constitutional: Negative for chills, fatigue and fever  HENT: Negative for congestion, ear pain, rhinorrhea and sore throat  Eyes: Negative for redness  Respiratory: Negative for chest tightness and shortness of breath  Cardiovascular: Negative for chest pain and palpitations  Gastrointestinal: Negative for abdominal pain, nausea and vomiting  Genitourinary: Negative for dysuria and hematuria  Musculoskeletal: Negative  Skin: Negative for rash  Neurological: Negative for dizziness, syncope, light-headedness and numbness  Physical Exam  Physical Exam  Vitals and nursing note reviewed  Constitutional:       Appearance: She is well-developed and well-nourished  HENT:      Head: Normocephalic  Eyes:      General: No scleral icterus  Cardiovascular:      Rate and Rhythm: Normal rate and regular rhythm  Pulmonary:      Effort: Pulmonary effort is normal       Breath sounds: Normal breath sounds  No stridor  Abdominal:      General: There is no distension  Palpations: Abdomen is soft  Tenderness: There is no abdominal tenderness  Musculoskeletal:         General: Normal range of motion  Legs:       Comments: 2+ dorsalis pedis pulse  Normal range of motion of foot  No relying skin changes or redness     Skin: General: Skin is warm and dry  Capillary Refill: Capillary refill takes less than 2 seconds  Neurological:      Mental Status: She is alert and oriented to person, place, and time  Psychiatric:         Mood and Affect: Mood and affect normal          Vital Signs  ED Triage Vitals   Temperature Pulse Respirations Blood Pressure SpO2   12/02/22 1213 12/02/22 1213 12/02/22 1213 12/02/22 1213 12/02/22 1213   98 1 °F (36 7 °C) 98 18 132/80 100 %      Temp Source Heart Rate Source Patient Position - Orthostatic VS BP Location FiO2 (%)   12/02/22 1213 12/02/22 1213 12/02/22 1213 12/02/22 1213 --   Oral Monitor Sitting Left arm       Pain Score       12/02/22 1506       6           Vitals:    12/02/22 1213   BP: 132/80   Pulse: 98   Patient Position - Orthostatic VS: Sitting         Visual Acuity      ED Medications  Medications   lidocaine (LIDODERM) 5 % patch 1 patch (1 patch Topical Medication Applied 12/2/22 1506)   acetaminophen (TYLENOL) tablet 650 mg (650 mg Oral Given 12/2/22 1506)       Diagnostic Studies  Results Reviewed     None                 VAS lower limb venous duplex study, unilateral/limited    (Results Pending)              Procedures  Procedures         ED Course  ED Course as of 12/02/22 1513   Fri Dec 02, 2022   1508 Patient was reexamined at this time and informed of laboratory and/or imaging results and was found to be stable for discharge  Return to emergency department criteria was reviewed with the patient who verbalized understanding and was agreeable to discharge and the treatment plan at this time  MDM  Number of Diagnoses or Management Options  Leg pain, inferior, left  Diagnosis management comments: All imaging and/or lab testing discussed with patient, preliminary report by tech reported to be negative for dvt  strict return to ED precautions discussed   Patient recommended to follow up promptly with appropriate outpatient provider  Patient and/or family members verbalizes understanding and agrees with plan  Patient is stable for discharge      Portions of the record may have been created with voice recognition software  Occasional wrong word or "sound a like" substitutions may have occurred due to the inherent limitations of voice recognition software  Read the chart carefully and recognize, using context, where substitutions have occurred  Disposition  Final diagnoses:   Leg pain, inferior, left     Time reflects when diagnosis was documented in both MDM as applicable and the Disposition within this note     Time User Action Codes Description Comment    12/2/2022  3:03 PM Carlos Gan Add [M79 [id number]] Leg pain, inferior, left       ED Disposition     ED Disposition   Discharge    Condition   Stable    Date/Time   Fri Dec 2, 2022  3:01 PM    Comment   Philip Alonzo discharge to home/self care                 Follow-up Information     Follow up With Specialties Details Why Contact Info Additional Information    [facility] Schedule an appointment as soon as possible for a visit in 2 days for follow up regarding orthopedics [address]  [id number]  [address] [place] [address], [address] [place] [address], [id number] [phone]          Discharge Medication List as of 12/2/2022  3:03 PM      CONTINUE these medications which have NOT CHANGED    Details   busPIRone (BUSPAR) 5 mg tablet Currently 1 tab daily   May bump up if needed to twice a day, No Print      fluticasone (FLONASE) 50 mcg/act nasal spray 1 spray into each nostril daily, Starting Fri 9/17/2021, Print      hydrOXYzine HCL (ATARAX) 25 mg tablet Take 1 tablet (25 mg total) by mouth every 8 (eight) hours as needed for anxiety, Starting Mon 10/10/2022, Normal      ibuprofen (MOTRIN) 600 mg tablet Take 1 tablet (600 mg total) by mouth every 6 (six) hours as needed for mild pain or moderate pain, Starting Fri 9/17/2021, Print      loratadine (CLARITIN) 10 mg tablet Take 10 mg by mouth daily, Historical Med      meclizine (ANTIVERT) 25 mg tablet 1 tablet every 8 (eight) hours as needed , Starting Tue 2/14/2017, Historical Med      nicotine (NICODERM CQ) 21 mg/24 hr TD 24 hr patch Place 1 patch on the skin every 24 hours, Starting Wed 8/12/2020, Normal      nicotine polacrilex (NICORETTE) 2 mg gum Chew 1 each (2 mg total) as needed for smoking cessation, Starting Wed 8/12/2020, Normal      pantoprazole (PROTONIX) 40 mg tablet Take 1 tablet (40 mg total) by mouth daily, Starting Thu 9/30/2021, Normal      venlafaxine 37 5 mg 24 hr tablet Take 1 tablet (37 5 mg total) by mouth daily with breakfast For 1 week, then 2 tabs a day, Starting Fri 1/14/2022, Normal             No discharge procedures on file      PDMP Review     None          ED Provider  Electronically Signed by           Daisy Zamorano PA-C  12/02/22 0794

## 2023-01-03 DIAGNOSIS — K21.9 GASTROESOPHAGEAL REFLUX DISEASE: ICD-10-CM

## 2023-01-03 RX ORDER — PANTOPRAZOLE SODIUM 40 MG/1
40 TABLET, DELAYED RELEASE ORAL DAILY
Qty: 90 TABLET | Refills: 3 | Status: SHIPPED | OUTPATIENT
Start: 2023-01-03

## 2023-02-06 ENCOUNTER — OFFICE VISIT (OUTPATIENT)
Dept: FAMILY MEDICINE CLINIC | Facility: CLINIC | Age: 58
End: 2023-02-06

## 2023-02-06 VITALS
TEMPERATURE: 97.5 F | BODY MASS INDEX: 20.53 KG/M2 | DIASTOLIC BLOOD PRESSURE: 70 MMHG | SYSTOLIC BLOOD PRESSURE: 106 MMHG | WEIGHT: 97.8 LBS | HEART RATE: 72 BPM | HEIGHT: 58 IN | OXYGEN SATURATION: 99 %

## 2023-02-06 DIAGNOSIS — K21.9 GASTROESOPHAGEAL REFLUX DISEASE, UNSPECIFIED WHETHER ESOPHAGITIS PRESENT: ICD-10-CM

## 2023-02-06 DIAGNOSIS — R73.03 PREDIABETES: ICD-10-CM

## 2023-02-06 DIAGNOSIS — Z12.31 ENCOUNTER FOR SCREENING MAMMOGRAM FOR BREAST CANCER: ICD-10-CM

## 2023-02-06 DIAGNOSIS — F17.200 TOBACCO DEPENDENCE: ICD-10-CM

## 2023-02-06 DIAGNOSIS — Z00.00 ANNUAL PHYSICAL EXAM: Primary | ICD-10-CM

## 2023-02-06 DIAGNOSIS — Z71.85 IMMUNIZATION COUNSELING: ICD-10-CM

## 2023-02-06 DIAGNOSIS — Z12.11 SCREENING FOR MALIGNANT NEOPLASM OF COLON: ICD-10-CM

## 2023-02-06 DIAGNOSIS — Z28.21 IMMUNIZATION DECLINED: ICD-10-CM

## 2023-02-06 RX ORDER — FAMOTIDINE 40 MG/1
20 TABLET, FILM COATED ORAL 2 TIMES DAILY
Qty: 60 TABLET | Refills: 0 | Status: SHIPPED | OUTPATIENT
Start: 2023-02-06

## 2023-02-06 RX ORDER — BUPROPION HYDROCHLORIDE 150 MG/1
150 TABLET, EXTENDED RELEASE ORAL 2 TIMES DAILY
Qty: 60 TABLET | Refills: 0 | Status: SHIPPED | OUTPATIENT
Start: 2023-02-06

## 2023-02-06 NOTE — ASSESSMENT & PLAN NOTE
Nicotine use has significantly decreased but during increased times of stress she finds herself turning to the use of cigarettes as a coping mechanism   Had used nicotine patches in the past without effectiveness   Bupropion ordered

## 2023-02-06 NOTE — ASSESSMENT & PLAN NOTE
Was previously on pantoprazole 40mg    Is having symptoms of reoccurring reflux   · Trial famotidine 40mg to see if symptoms improve with new medication   GERD diet education provided

## 2023-02-06 NOTE — PATIENT INSTRUCTIONS
Wellness Visit for Adults   AMBULATORY CARE:   A wellness visit  is when you see your healthcare provider to get screened for health problems  Your healthcare provider will also give you advice on how to stay healthy  Write down your questions so you remember to ask them  Ask your healthcare provider how often you should have a wellness visit  What happens at a wellness visit:  Your healthcare provider will ask about your health, and your family history of health problems  This includes high blood pressure, heart disease, and cancer  He or she will ask if you have symptoms that concern you, if you smoke, and about your mood  You may also be asked about your intake of medicines, supplements, food, and alcohol  Any of the following may be done:  · Your weight  will be checked  Your height may also be checked so your body mass index (BMI) can be calculated  Your BMI shows if you are at a healthy weight  · Your blood pressure  and heart rate will be checked  Your temperature may also be checked  · Blood and urine tests  may be done  Blood tests may be done to check your cholesterol levels  Abnormal cholesterol levels increase your risk for heart disease and stroke  You may also need a blood or urine test to check for diabetes if you are at increased risk  Urine tests may be done to look for signs of an infection or kidney disease  · A physical exam  includes checking your heartbeat and lungs with a stethoscope  Your healthcare provider may also check your skin to look for sun damage  · Screening tests  may be recommended  A screening test is done to check for diseases that may not cause symptoms  The screening tests you may need depend on your age, gender, family history, and lifestyle habits  For example, colorectal screening may be recommended if you are 48years old or older  Screening tests you need if you are a woman:   · A Pap smear  is used to screen for cervical cancer   Pap smears are usually done every 3 to 5 years depending on your age  You may need them more often if you have had abnormal Pap smear test results in the past  Ask your healthcare provider how often you should have a Pap smear  · A mammogram  is an x-ray of your breasts to screen for breast cancer  Experts recommend mammograms every 2 years starting at age 48 years  You may need a mammogram at age 52 years or younger if you have an increased risk for breast cancer  Talk to your healthcare provider about when you should start having mammograms and how often you need them  Vaccines you may need:   · Get an influenza vaccine  every year  The influenza vaccine protects you from the flu  Several types of viruses cause the flu  The viruses change over time, so new vaccines are made each year  · Get a tetanus-diphtheria (Td) booster vaccine  every 10 years  This vaccine protects you against tetanus and diphtheria  Tetanus is a severe infection that may cause painful muscle spasms and lockjaw  Diphtheria is a severe bacterial infection that causes a thick covering in the back of your mouth and throat  · Get a human papillomavirus (HPV) vaccine  if you are female and aged 23 to 32 or male 23 to 24 and never received it  This vaccine protects you from HPV infection  HPV is the most common infection spread by sexual contact  HPV may also cause vaginal, penile, and anal cancers  · Get a pneumococcal vaccine  if you are aged 72 years or older  The pneumococcal vaccine is an injection given to protect you from pneumococcal disease  Pneumococcal disease is an infection caused by pneumococcal bacteria  The infection may cause pneumonia, meningitis, or an ear infection  · Get a shingles vaccine  if you are 60 or older, even if you have had shingles before  The shingles vaccine is an injection to protect you from the varicella-zoster virus  This is the same virus that causes chickenpox   Shingles is a painful rash that develops in people who had chickenpox or have been exposed to the virus  How to eat healthy:  My Plate is a model for planning healthy meals  It shows the types and amounts of foods that should go on your plate  Fruits and vegetables make up about half of your plate, and grains and protein make up the other half  A serving of dairy is included on the side of your plate  The amount of calories and serving sizes you need depends on your age, gender, weight, and height  Examples of healthy foods are listed below:  · Eat a variety of vegetables  such as dark green, red, and orange vegetables  You can also include canned vegetables low in sodium (salt) and frozen vegetables without added butter or sauces  · Eat a variety of fresh fruits , canned fruit in 100% juice, frozen fruit, and dried fruit  · Include whole grains  At least half of the grains you eat should be whole grains  Examples include whole-wheat bread, wheat pasta, brown rice, and whole-grain cereals such as oatmeal     · Eat a variety of protein foods such as seafood (fish and shellfish), lean meat, and poultry without skin (turkey and chicken)  Examples of lean meats include pork leg, shoulder, or tenderloin, and beef round, sirloin, tenderloin, and extra lean ground beef  Other protein foods include eggs and egg substitutes, beans, peas, soy products, nuts, and seeds  · Choose low-fat dairy products such as skim or 1% milk or low-fat yogurt, cheese, and cottage cheese  · Limit unhealthy fats  such as butter, hard margarine, and shortening  Exercise:  Exercise at least 30 minutes per day on most days of the week  Some examples of exercise include walking, biking, dancing, and swimming  You can also fit in more physical activity by taking the stairs instead of the elevator or parking farther away from stores  Include muscle strengthening activities 2 days each week  Regular exercise provides many health benefits   It helps you manage your weight, and decreases your risk for type 2 diabetes, heart disease, stroke, and high blood pressure  Exercise can also help improve your mood  Ask your healthcare provider about the best exercise plan for you  General health and safety guidelines:   · Do not smoke  Nicotine and other chemicals in cigarettes and cigars can cause lung damage  Ask your healthcare provider for information if you currently smoke and need help to quit  E-cigarettes or smokeless tobacco still contain nicotine  Talk to your healthcare provider before you use these products  · Limit alcohol  A drink of alcohol is 12 ounces of beer, 5 ounces of wine, or 1½ ounces of liquor  · Lose weight, if needed  Being overweight increases your risk of certain health conditions  These include heart disease, high blood pressure, type 2 diabetes, and certain types of cancer  · Protect your skin  Do not sunbathe or use tanning beds  Use sunscreen with a SPF 15 or higher  Apply sunscreen at least 15 minutes before you go outside  Reapply sunscreen every 2 hours  Wear protective clothing, hats, and sunglasses when you are outside  · Drive safely  Always wear your seatbelt  Make sure everyone in your car wears a seatbelt  A seatbelt can save your life if you are in an accident  Do not use your cell phone when you are driving  This could distract you and cause an accident  Pull over if you need to make a call or send a text message  · Practice safe sex  Use latex condoms if are sexually active and have more than one partner  Your healthcare provider may recommend screening tests for sexually transmitted infections (STIs)  · Wear helmets, lifejackets, and protective gear  Always wear a helmet when you ride a bike or motorcycle, go skiing, or play sports that could cause a head injury  Wear protective equipment when you play sports  Wear a lifejacket when you are on a boat or doing water sports      © Copyright [employer] 2022 Information is for End User's use only and may not be sold, redistributed or otherwise used for commercial purposes  All illustrations and images included in CareNotes® are the copyrighted property of [employer] D A M [employer]  or Lianna Corley  The above information is an  only  It is not intended as medical advice for individual conditions or treatments  Talk to your doctor, nurse or pharmacist before following any medical regimen to see if it is safe and effective for you  Cigarette Smoking and Your Health   AMBULATORY CARE:   Risks to your health if you smoke:  Nicotine and other chemicals found in tobacco and e-cigarettes can damage every cell in your body  Even if you are a light smoker, you have an increased risk for cancer, heart disease, and lung disease  If you are pregnant or have diabetes, smoking increases your risk for complications  Nicotine can affect an adolescent's developing brain  This can lead to trouble thinking, learning, or paying attention  Benefits to your health if you stop smoking:   · You decrease respiratory symptoms such as coughing, wheezing, and shortness of breath  · You reduce your risk for cancers of the lung, mouth, throat, kidney, bladder, pancreas, stomach, and cervix  If you already have cancer, you increase the benefits of chemotherapy  You also reduce your risk for cancer returning or a second cancer from developing  · You reduce your risk for heart disease, blood clots, heart attack, and stroke  · You reduce your risk for lung infections, and diseases such as pneumonia, asthma, chronic bronchitis, and emphysema  · Your circulation improves  More oxygen can be delivered to your body  If you have diabetes, you lower your risk for complications, such as kidney, artery, and eye diseases  You also lower your risk for nerve damage  Nerve damage can lead to amputations, poor vision, and blindness      · You improve your body's ability to heal and to fight infections  · An adolescent can help his or her brain and body develop in a healthy way  Talk to your adolescent about all the health risks of nicotine  If you can, start talking about nicotine when your child is younger than 12 years  This may make it easier for him or her not to start using nicotine as a teenager or adult  Explain to him or her that it is best never to start  It can be hard to try to quit later  Benefits to the health of others if you stop smoking:  Tobacco is harmful to nonsmokers who breathe in your secondhand smoke  The following are ways the health of others around you may improve when you stop smoking:  · You lower the risks for lung cancer and heart disease in nonsmoking adults  · If you are pregnant, you lower the risk for miscarriage, early delivery, low birth weight, and stillbirth  You also lower your baby's risk for SIDS, obesity, developmental delay, and neurobehavioral problems, such as ADHD  · If you have children, you lower their risk for ear infections, colds, pneumonia, bronchitis, and asthma  Follow up with your doctor as directed:  Write down your questions so you remember to ask them during your visits  For support and more information:   · [employer]  [address] [facility]  [address]  Phone: [facility] [address]  Phone: 6- 550 - 434-8267  Web Address: Karen Pollack  [employer]    · Smokefree  gov  Phone: 4- 169 - 055-5816  Web Address: www smokefree  [facility] 21 2022 Information is for End User's use only and may not be sold, redistributed or otherwise used for commercial purposes  All illustrations and images included in CareNotes® are the copyrighted property of [employer]  or [address] [place], [place] [address]raul   The above information is an  only  It is not intended as medical advice for individual conditions or treatments   Talk to your doctor, nurse or pharmacist before following any medical regimen to see if it is safe and effective for you

## 2023-02-06 NOTE — PROGRESS NOTES
[address]    NAME: Arlette Cardoso  AGE: 61 y o  SEX: female  : 1965     DATE: 2023     Assessment and Plan:     Problem List Items Addressed This Visit        Digestive    Gastroesophageal reflux disease     Was previously on pantoprazole 40mg    Is having symptoms of reoccurring reflux   · Trial famotidine 40mg to see if symptoms improve with new medication   GERD diet education provided         Relevant Medications    famotidine (PEPCID) 40 MG tablet       Other    Prediabetes      Last A1C 5 7   Repeat A1C ordered   Follow diabetic diet which includes limiting complex carbohydrates (Breads, pasta, rice), plenty of fruits and vegetables and limit sugar intake          Relevant Orders    Hemoglobin A1C    Tobacco dependence     Nicotine use has significantly decreased but during increased times of stress she finds herself turning to the use of cigarettes as a coping mechanism   Had used nicotine patches in the past without effectiveness   Bupropion ordered          Relevant Medications    buPROPion (ZYBAN) 150 MG 12 hr tablet   Other Visit Diagnoses     Annual physical exam    -  Primary    Relevant Orders    Lipid panel    Hemoglobin A1C    Comprehensive metabolic panel    Screening for malignant neoplasm of colon        Relevant Orders    Ambulatory referral for Colonoscopy    Encounter for screening mammogram for breast cancer        Relevant Orders    Mammo screening bilateral w 3d & cad    Immunization declined        Tdap declined  Risks and benefits discussed  Counseling provided  Immunization counseling        Tdap declined  Risks and benefits discussed  Counseling provided  Immunizations and preventive care screenings were discussed with patient today  Appropriate education was printed on patient's after visit summary      Counseling:  Alcohol/drug use: discussed moderation in alcohol intake, the recommendations for healthy alcohol use, and avoidance of illicit drug use  Dental Health: discussed importance of regular tooth brushing, flossing, and dental visits  Injury prevention: discussed safety/seat belts, safety helmets, smoke detectors, carbon dioxide detectors, and smoking near bedding or upholstery  Sexual health: discussed sexually transmitted diseases, partner selection, use of condoms, avoidance of unintended pregnancy, and contraceptive alternatives  · Exercise: the importance of regular exercise/physical activity was discussed  Recommend exercise 3-5 times per week for at least 30 minutes  Return in 1 year (on 2/6/2024) for Annual physical      Chief Complaint:     Chief Complaint   Patient presents with   • Annual Exam      History of Present Illness:     Adult Annual Physical   Patient here for a comprehensive physical exam  The patient reports problems - Stomach complaints  Diet and Physical Activity  · Diet/Nutrition: well balanced diet  · Exercise: no formal exercise  Depression Screening  PHQ-2/9 Depression Screening         General Health  · Sleep: sleeps well  · Hearing: normal - none   · Vision: wears contacts  · Dental: regular dental visits  /GYN Health  · Patient is: postmenopausal       Review of Systems:     Review of Systems   Constitutional: Negative for activity change, chills, fatigue and fever  HENT: Negative for congestion, ear pain, rhinorrhea, sore throat and trouble swallowing  Eyes: Negative for pain and visual disturbance  Respiratory: Negative for cough, chest tightness and shortness of breath  Cardiovascular: Negative for chest pain, palpitations and leg swelling  Gastrointestinal: Negative for abdominal pain, constipation, diarrhea, nausea and vomiting  Genitourinary: Negative for difficulty urinating, dysuria, hematuria and urgency  Musculoskeletal: Negative for arthralgias and back pain     Skin: Negative for color change and rash  Neurological: Negative for dizziness, seizures, syncope and headaches  Psychiatric/Behavioral: Negative for dysphoric mood  The patient is not nervous/anxious  All other systems reviewed and are negative       Past Medical History:     Past Medical History:   Diagnosis Date   • Anxiety    • Cervical radiculopathy    • Cervical spinal stenosis    • Depression    • GERD (gastroesophageal reflux disease)    • Hiatal hernia    • History of transfusion    • Myofascial pain syndrome    • Polyarthralgia    • TIA (transient ischemic attack)    • Vertigo       Past Surgical History:     Past Surgical History:   Procedure Laterality Date   • CARPAL TUNNEL RELEASE     •  SECTION     • CHOLECYSTECTOMY     • COLONOSCOPY     • ECTOPIC PREGNANCY SURGERY     • HYSTERECTOMY      Total unilateral removal tube and ovary   • LAPAROSCOPY     • OOPHORECTOMY      has one ovary   • GA BLEPHAROPLASTY LOWER EYELID Bilateral 2020    Procedure: BLEPHAROPLASTY LOWER;  Surgeon: Roman Britt MD;  Location:  MAIN OR;  Service: Plastics   • GA EXCISION GANGLION WRIST DORSAL/VOLAR PRIMARY Left 2020    Procedure: EXCISION GANGLION CYST;  Surgeon: Roman Britt MD;  Location: [address] [place] [address] MAIN OR;  Service: Plastics   • PUBOVAGINAL SLING     • REPAIR RECTOCELE     • TONSILECTOMY AND ADNOIDECTOMY     • TONSILLECTOMY        Social History:     Social History     Socioeconomic History   • Marital status: /Civil Union     Spouse name: None   • Number of children: None   • Years of education: None   • Highest education level: None   Occupational History   • None   Tobacco Use   • Smoking status: Some Days     Packs/day: 0 25     Types: Cigarettes   • Smokeless tobacco: Never   • Tobacco comments:     using patch and gum   Vaping Use   • Vaping Use: Never used   Substance and Sexual Activity   • Alcohol use: No   • Drug use: Never   • Sexual activity: Yes     Partners: Male     Comment:  x 17 years   Other Topics Concern   • None   Social History Narrative   • None     Social Determinants of Health     Financial Resource Strain: Not on file   Food Insecurity: Not on file   Transportation Needs: Not on file   Physical Activity: Not on file   Stress: Not on file   Social Connections: Not on file   Intimate Partner Violence: Not on file   Housing Stability: Not on file      Family History:     Family History   Problem Relation Age of Onset   • Asthma Mother    • Hypertension Father    • Ulcers Brother    • HIV Brother    • Thyroid disease Other    • Stroke Other    • Hepatitis Sister         C and B   • No Known Problems Daughter    • No Known Problems Son    • Other Maternal Grandmother         pre-diabetic   • No Known Problems Maternal Grandfather    • No Known Problems Paternal Grandmother    • Diabetes Paternal Grandfather    • No Known Problems Maternal Aunt    • No Known Problems Maternal Aunt    • No Known Problems Maternal Aunt    • No Known Problems Maternal Aunt    • No Known Problems Maternal Aunt    • No Known Problems Maternal Aunt    • No Known Problems Paternal Aunt    • No Known Problems Paternal Aunt       Current Medications:     Current Outpatient Medications   Medication Sig Dispense Refill   • buPROPion (ZYBAN) 150 MG 12 hr tablet Take 1 tablet (150 mg total) by mouth 2 (two) times a day 60 tablet 0   • busPIRone (BUSPAR) 5 mg tablet Currently 1 tab daily   May bump up if needed to twice a day 60 tablet 5   • famotidine (PEPCID) 40 MG tablet Take 0 5 tablets (20 mg total) by mouth 2 (two) times a day 60 tablet 0   • fluticasone (FLONASE) 50 mcg/act nasal spray 1 spray into each nostril daily 16 g 0   • hydrOXYzine HCL (ATARAX) 25 mg tablet Take 1 tablet (25 mg total) by mouth every 8 (eight) hours as needed for anxiety 20 tablet 0   • ibuprofen (MOTRIN) 600 mg tablet Take 1 tablet (600 mg total) by mouth every 6 (six) hours as needed for mild pain or moderate pain 30 tablet 0   • loratadine (CLARITIN) 10 mg tablet Take 10 mg by mouth daily     • meclizine (ANTIVERT) 25 mg tablet 1 tablet every 8 (eight) hours as needed        No current facility-administered medications for this visit  Allergies: Allergies   Allergen Reactions   • Gabapentin Hives   • Escitalopram Drowsiness   • Fluoxetine      Ringing in ears   • Sertraline GI Intolerance   • Varenicline GI Intolerance      Physical Exam:     /70 (BP Location: Left arm, Patient Position: Sitting, Cuff Size: Adult)   Pulse 72   Temp 97 5 °F (36 4 °C)   Ht 4' 9 75" (1 467 m)   Wt 44 4 kg (97 lb 12 8 oz)   LMP  (LMP Unknown)   SpO2 99%   BMI 20 62 kg/m²     Physical Exam  Vitals and nursing note reviewed  Constitutional:       General: She is not in acute distress  Appearance: Normal appearance  She is well-developed  HENT:      Head: Normocephalic and atraumatic  Right Ear: Tympanic membrane, ear canal and external ear normal  There is no impacted cerumen  Left Ear: Tympanic membrane, ear canal and external ear normal  There is no impacted cerumen  Nose: Nose normal       Mouth/Throat:      Mouth: Mucous membranes are moist    Eyes:      Conjunctiva/sclera: Conjunctivae normal       Pupils: Pupils are equal, round, and reactive to light  Cardiovascular:      Rate and Rhythm: Normal rate and regular rhythm  Heart sounds: No murmur heard  Pulmonary:      Effort: Pulmonary effort is normal  No respiratory distress  Breath sounds: Normal breath sounds  Abdominal:      Palpations: Abdomen is soft  Tenderness: There is no abdominal tenderness  Musculoskeletal:         General: Normal range of motion  Cervical back: Neck supple  Right lower leg: No edema  Left lower leg: No edema  Skin:     General: Skin is warm and dry  Capillary Refill: Capillary refill takes less than 2 seconds  Neurological:      General: No focal deficit present        Mental Status: She is alert and oriented to person, place, and time  Mental status is at baseline  Psychiatric:         Mood and Affect: Mood normal          Behavior: Behavior normal         Patient Instructions       Wellness Visit for Adults   AMBULATORY CARE:   A wellness visit  is when you see your healthcare provider to get screened for health problems  Your healthcare provider will also give you advice on how to stay healthy  Write down your questions so you remember to ask them  Ask your healthcare provider how often you should have a wellness visit  What happens at a wellness visit:  Your healthcare provider will ask about your health, and your family history of health problems  This includes high blood pressure, heart disease, and cancer  He or she will ask if you have symptoms that concern you, if you smoke, and about your mood  You may also be asked about your intake of medicines, supplements, food, and alcohol  Any of the following may be done:  · Your weight  will be checked  Your height may also be checked so your body mass index (BMI) can be calculated  Your BMI shows if you are at a healthy weight  · Your blood pressure  and heart rate will be checked  Your temperature may also be checked  · Blood and urine tests  may be done  Blood tests may be done to check your cholesterol levels  Abnormal cholesterol levels increase your risk for heart disease and stroke  You may also need a blood or urine test to check for diabetes if you are at increased risk  Urine tests may be done to look for signs of an infection or kidney disease  · A physical exam  includes checking your heartbeat and lungs with a stethoscope  Your healthcare provider may also check your skin to look for sun damage  · Screening tests  may be recommended  A screening test is done to check for diseases that may not cause symptoms  The screening tests you may need depend on your age, gender, family history, and lifestyle habits   For example, colorectal screening may be recommended if you are 48years old or older  Screening tests you need if you are a woman:   · A Pap smear  is used to screen for cervical cancer  Pap smears are usually done every 3 to 5 years depending on your age  You may need them more often if you have had abnormal Pap smear test results in the past  Ask your healthcare provider how often you should have a Pap smear  · A mammogram  is an x-ray of your breasts to screen for breast cancer  Experts recommend mammograms every 2 years starting at age 48 years  You may need a mammogram at age 52 years or younger if you have an increased risk for breast cancer  Talk to your healthcare provider about when you should start having mammograms and how often you need them  Vaccines you may need:   · Get an influenza vaccine  every year  The influenza vaccine protects you from the flu  Several types of viruses cause the flu  The viruses change over time, so new vaccines are made each year  · Get a tetanus-diphtheria (Td) booster vaccine  every 10 years  This vaccine protects you against tetanus and diphtheria  Tetanus is a severe infection that may cause painful muscle spasms and lockjaw  Diphtheria is a severe bacterial infection that causes a thick covering in the back of your mouth and throat  · Get a human papillomavirus (HPV) vaccine  if you are female and aged 23 to 32 or male 23 to 24 and never received it  This vaccine protects you from HPV infection  HPV is the most common infection spread by sexual contact  HPV may also cause vaginal, penile, and anal cancers  · Get a pneumococcal vaccine  if you are aged 72 years or older  The pneumococcal vaccine is an injection given to protect you from pneumococcal disease  Pneumococcal disease is an infection caused by pneumococcal bacteria  The infection may cause pneumonia, meningitis, or an ear infection      · Get a shingles vaccine  if you are 60 or older, even if you have had shingles before  The shingles vaccine is an injection to protect you from the varicella-zoster virus  This is the same virus that causes chickenpox  Shingles is a painful rash that develops in people who had chickenpox or have been exposed to the virus  How to eat healthy:  My Plate is a model for planning healthy meals  It shows the types and amounts of foods that should go on your plate  Fruits and vegetables make up about half of your plate, and grains and protein make up the other half  A serving of dairy is included on the side of your plate  The amount of calories and serving sizes you need depends on your age, gender, weight, and height  Examples of healthy foods are listed below:  · Eat a variety of vegetables  such as dark green, red, and orange vegetables  You can also include canned vegetables low in sodium (salt) and frozen vegetables without added butter or sauces  · Eat a variety of fresh fruits , canned fruit in 100% juice, frozen fruit, and dried fruit  · Include whole grains  At least half of the grains you eat should be whole grains  Examples include whole-wheat bread, wheat pasta, brown rice, and whole-grain cereals such as oatmeal     · Eat a variety of protein foods such as seafood (fish and shellfish), lean meat, and poultry without skin (turkey and chicken)  Examples of lean meats include pork leg, shoulder, or tenderloin, and beef round, sirloin, tenderloin, and extra lean ground beef  Other protein foods include eggs and egg substitutes, beans, peas, soy products, nuts, and seeds  · Choose low-fat dairy products such as skim or 1% milk or low-fat yogurt, cheese, and cottage cheese  · Limit unhealthy fats  such as butter, hard margarine, and shortening  Exercise:  Exercise at least 30 minutes per day on most days of the week  Some examples of exercise include walking, biking, dancing, and swimming   You can also fit in more physical activity by taking the stairs instead of the elevator or parking farther away from stores  Include muscle strengthening activities 2 days each week  Regular exercise provides many health benefits  It helps you manage your weight, and decreases your risk for type 2 diabetes, heart disease, stroke, and high blood pressure  Exercise can also help improve your mood  Ask your healthcare provider about the best exercise plan for you  General health and safety guidelines:   · Do not smoke  Nicotine and other chemicals in cigarettes and cigars can cause lung damage  Ask your healthcare provider for information if you currently smoke and need help to quit  E-cigarettes or smokeless tobacco still contain nicotine  Talk to your healthcare provider before you use these products  · Limit alcohol  A drink of alcohol is 12 ounces of beer, 5 ounces of wine, or 1½ ounces of liquor  · Lose weight, if needed  Being overweight increases your risk of certain health conditions  These include heart disease, high blood pressure, type 2 diabetes, and certain types of cancer  · Protect your skin  Do not sunbathe or use tanning beds  Use sunscreen with a SPF 15 or higher  Apply sunscreen at least 15 minutes before you go outside  Reapply sunscreen every 2 hours  Wear protective clothing, hats, and sunglasses when you are outside  · Drive safely  Always wear your seatbelt  Make sure everyone in your car wears a seatbelt  A seatbelt can save your life if you are in an accident  Do not use your cell phone when you are driving  This could distract you and cause an accident  Pull over if you need to make a call or send a text message  · Practice safe sex  Use latex condoms if are sexually active and have more than one partner  Your healthcare provider may recommend screening tests for sexually transmitted infections (STIs)  · Wear helmets, lifejackets, and protective gear    Always wear a helmet when you ride a bike or motorcycle, go skiing, or play sports that could cause a head injury  Wear protective equipment when you play sports  Wear a lifejacket when you are on a boat or doing water sports  © Copyright [employer] 2022 Information is for End User's use only and may not be sold, redistributed or otherwise used for commercial purposes  All illustrations and images included in CareNotes® are the copyrighted property of [employer]  or Lianna Gordon   The above information is an  only  It is not intended as medical advice for individual conditions or treatments  Talk to your doctor, nurse or pharmacist before following any medical regimen to see if it is safe and effective for you  Cigarette Smoking and Your Health   AMBULATORY CARE:   Risks to your health if you smoke:  Nicotine and other chemicals found in tobacco and e-cigarettes can damage every cell in your body  Even if you are a light smoker, you have an increased risk for cancer, heart disease, and lung disease  If you are pregnant or have diabetes, smoking increases your risk for complications  Nicotine can affect an adolescent's developing brain  This can lead to trouble thinking, learning, or paying attention  Benefits to your health if you stop smoking:   · You decrease respiratory symptoms such as coughing, wheezing, and shortness of breath  · You reduce your risk for cancers of the lung, mouth, throat, kidney, bladder, pancreas, stomach, and cervix  If you already have cancer, you increase the benefits of chemotherapy  You also reduce your risk for cancer returning or a second cancer from developing  · You reduce your risk for heart disease, blood clots, heart attack, and stroke  · You reduce your risk for lung infections, and diseases such as pneumonia, asthma, chronic bronchitis, and emphysema  · Your circulation improves  More oxygen can be delivered to your body   If you have diabetes, you lower your risk for complications, such as kidney, artery, and eye diseases  You also lower your risk for nerve damage  Nerve damage can lead to amputations, poor vision, and blindness  · You improve your body's ability to heal and to fight infections  · An adolescent can help his or her brain and body develop in a healthy way  Talk to your adolescent about all the health risks of nicotine  If you can, start talking about nicotine when your child is younger than 12 years  This may make it easier for him or her not to start using nicotine as a teenager or adult  Explain to him or her that it is best never to start  It can be hard to try to quit later  Benefits to the health of others if you stop smoking:  Tobacco is harmful to nonsmokers who breathe in your secondhand smoke  The following are ways the health of others around you may improve when you stop smoking:  · You lower the risks for lung cancer and heart disease in nonsmoking adults  · If you are pregnant, you lower the risk for miscarriage, early delivery, low birth weight, and stillbirth  You also lower your baby's risk for SIDS, obesity, developmental delay, and neurobehavioral problems, such as ADHD  · If you have children, you lower their risk for ear infections, colds, pneumonia, bronchitis, and asthma  Follow up with your doctor as directed:  Write down your questions so you remember to ask them during your visits  For support and more information:   · American [employer] Association  [address] [facility]  [address]  Phone: [facility] [address]  Phone: 4- 463 - 276-0077  Web Address: Expert[employer]    · Smokefree  gov  Phone: 2- 714 - 446-5997  Web Address: www smokefree  [facility] 2022 Information is for End User's use only and may not be sold, redistributed or otherwise used for commercial purposes  All illustrations and images included in CareNotes® are the copyrighted property of [employer]  or [facility] Florian Gordon   The above information is an  only   It is not intended as medical advice for individual conditions or treatments  Talk to your doctor, nurse or pharmacist before following any medical regimen to see if it is safe and effective for you          THOM Salas  [address]

## 2023-02-06 NOTE — ASSESSMENT & PLAN NOTE
2/22 Last A1C 5 7   Repeat A1C ordered   Follow diabetic diet which includes limiting complex carbohydrates (Breads, pasta, rice), plenty of fruits and vegetables and limit sugar intake

## 2023-02-07 ENCOUNTER — TELEPHONE (OUTPATIENT)
Dept: GASTROENTEROLOGY | Facility: CLINIC | Age: 58
End: 2023-02-07

## 2023-02-07 ENCOUNTER — PREP FOR PROCEDURE (OUTPATIENT)
Dept: GASTROENTEROLOGY | Facility: CLINIC | Age: 58
End: 2023-02-07

## 2023-02-07 DIAGNOSIS — Z12.11 SCREENING FOR COLON CANCER: Primary | ICD-10-CM

## 2023-02-07 NOTE — TELEPHONE ENCOUNTER
Scheduled date of colonoscopy (as of today):3/20/23  Physician performing colonoscopy:DR RAMIREZ  Location of colonoscopy:  Clearances: NA

## 2023-02-08 ENCOUNTER — TELEPHONE (OUTPATIENT)
Dept: GASTROENTEROLOGY | Facility: CLINIC | Age: 58
End: 2023-02-08

## 2023-02-08 DIAGNOSIS — Z12.11 SCREENING FOR MALIGNANT NEOPLASM OF COLON: Primary | ICD-10-CM

## 2023-02-08 NOTE — TELEPHONE ENCOUNTER
Patients GI provider:      Number to return call: [phone]    Reason for call: Pt calling because she wants to add an EGD to her colonoscopy  I advised she would need a consultation  She states she never had to before   She would like a call back    Scheduled procedure/appointment date if applicable: Procedure 4/31/52

## 2023-02-09 NOTE — TELEPHONE ENCOUNTER
Pt called back stating  she had her last EGD at [facility]  She is having stomach pain after eating   She doesn't want to come in for an office visit, states she shouldn't have to since she has a colon scheduled already

## 2023-02-10 NOTE — TELEPHONE ENCOUNTER
Called patient, reached voicemail, left message to call back  In regards to needing an OV to add EGD

## 2023-03-01 ENCOUNTER — PREP FOR PROCEDURE (OUTPATIENT)
Dept: GASTROENTEROLOGY | Facility: CLINIC | Age: 58
End: 2023-03-01

## 2023-03-01 ENCOUNTER — OFFICE VISIT (OUTPATIENT)
Dept: GASTROENTEROLOGY | Facility: CLINIC | Age: 58
End: 2023-03-01

## 2023-03-01 VITALS
DIASTOLIC BLOOD PRESSURE: 68 MMHG | WEIGHT: 98.4 LBS | HEIGHT: 58 IN | SYSTOLIC BLOOD PRESSURE: 118 MMHG | BODY MASS INDEX: 20.65 KG/M2 | TEMPERATURE: 98.2 F

## 2023-03-01 DIAGNOSIS — K21.9 GASTROESOPHAGEAL REFLUX DISEASE, UNSPECIFIED WHETHER ESOPHAGITIS PRESENT: Primary | ICD-10-CM

## 2023-03-01 DIAGNOSIS — R10.9 ABDOMINAL PAIN, UNSPECIFIED ABDOMINAL LOCATION: ICD-10-CM

## 2023-03-01 DIAGNOSIS — Z12.11 COLON CANCER SCREENING: ICD-10-CM

## 2023-03-01 DIAGNOSIS — R68.81 EARLY SATIETY: ICD-10-CM

## 2023-03-01 DIAGNOSIS — R11.0 NAUSEA: ICD-10-CM

## 2023-03-01 RX ORDER — FAMOTIDINE 40 MG/1
40 TABLET, FILM COATED ORAL DAILY PRN
Qty: 60 TABLET | Refills: 0 | Status: SHIPPED | OUTPATIENT
Start: 2023-03-01

## 2023-03-01 RX ORDER — POLYETHYLENE GLYCOL 3350, SODIUM SULFATE ANHYDROUS, SODIUM BICARBONATE, SODIUM CHLORIDE, POTASSIUM CHLORIDE 236; 22.74; 6.74; 5.86; 2.97 G/4L; G/4L; G/4L; G/4L; G/4L
4000 POWDER, FOR SOLUTION ORAL ONCE
Qty: 4000 ML | Refills: 0 | Status: SHIPPED | OUTPATIENT
Start: 2023-03-01 | End: 2023-03-01

## 2023-03-01 RX ORDER — OMEPRAZOLE 40 MG/1
40 CAPSULE, DELAYED RELEASE ORAL DAILY
Qty: 30 CAPSULE | Refills: 0 | Status: SHIPPED | OUTPATIENT
Start: 2023-03-01

## 2023-03-01 NOTE — PROGRESS NOTES
Consultation -  [facility]  Daja Moore 61 y o  female MRN: [id number]  @ Encounter: [id number]    ASSESSMENT AND PLAN:      59-year-old female with past medical history of GERD, TIA, BPPV, tobacco use, prediabetes who being seen for epigastric pain, GERD  1  Epigastric pain  2  Nausea  3  Early satiety  4  GERD  5  History of hiatal hernia  6  History of H  pylori  Worsening symptoms  Currently on Pepcid  Labs currently normal   Suspect worsening GERD  The differentials include gastroparesis versus functional pain  • We will plan for EGD at same time as colonoscopy  We will get H  pylori and celiac biopsies  • Continue Pepcid 40 mg as needed  • Start Prilosec 40 mg daily given severity of symptoms  Patient is unwilling to hold medication prior to endoscopy  • Obtain gastric emptying study  • Follow-up in 3 months after procedure  7  Colon cancer screening  Average risk  · Patient is already scheduled for colonoscopy  · GoLytely/Dulcolax prep ordered  · Follow-up after procedure  Follow up in 3 months  Thank you for this consultation  ______________________________________________________________________    HPI:  59-year-old female with past medical history of GERD, TIA, BPPV, tobacco use, prediabetes who being seen for epigastric pain, GERD  States she has had worsening abdominal pain over the past few months  Epigastric in nature and worse after eating  Pain last for approximately an hour  She has been using Tums, Protonix (which is not currently prescribed), Pepcid to minimal relief  Also admits to early satiety, nausea but no vomiting  Denies any dysphagia, constipation, diarrhea, blood in stool  Denies any significant family history for cancer  Smokes cigarettes occasionally  Drinks twice a month  Denies any other drug use    Has extensive surgical history which includes cholecystectomy, C-sections, etc  poorly had history of H  pylori which was treated twice with antibiotics  Subsequent biopsies were negative  HEALTHCARE MAINTENANCE:   Hepatitis C screening negative in   Last EGD: EGD 2016, negative for H  pylori  Last Colonoscopy: Colonoscopy in , reportedly normal   No report available to review  REVIEW OF SYSTEMS:    Review of Systems   Constitutional: Negative for chills and fever  HENT: Negative for congestion and sinus pressure  Respiratory: Negative for cough and shortness of breath  Cardiovascular: Negative for chest pain, palpitations and leg swelling  Gastrointestinal: Positive for abdominal pain and nausea  Negative for constipation, diarrhea and vomiting  Genitourinary: Negative for dysuria, frequency and hematuria  Musculoskeletal: Negative for arthralgias, back pain and myalgias  Skin: Negative for color change and rash  Neurological: Negative for dizziness and headaches  Psychiatric/Behavioral: Negative for agitation and confusion  All other systems reviewed and are negative         Historical Information   Past Medical History:   Diagnosis Date   • Anxiety    • Cervical radiculopathy    • Cervical spinal stenosis    • Depression    • GERD (gastroesophageal reflux disease)    • Hiatal hernia    • History of transfusion    • Myofascial pain syndrome    • Polyarthralgia    • TIA (transient ischemic attack)    • Vertigo      Past Surgical History:   Procedure Laterality Date   • CARPAL TUNNEL RELEASE     •  SECTION     • CHOLECYSTECTOMY     • COLONOSCOPY     • ECTOPIC PREGNANCY SURGERY     • HYSTERECTOMY      Total unilateral removal tube and ovary   • LAPAROSCOPY     • OOPHORECTOMY      has one ovary   • [place] BLEPHAROPLASTY LOWER EYELID Bilateral 2020    Procedure: BLEPHAROPLASTY LOWER;  Surgeon: Romel Moraes MD;  Location: [address] [place] [address];  Service: Plastics   • [place] EXCISION GANGLION WRIST DORSAL/VOLAR PRIMARY Left 2020    Procedure: EXCISION GANGLION CYST;  Surgeon: Romel Moraes MD;  Location:  MAIN OR;  Service: Plastics   • PUBOVAGINAL SLING     • REPAIR RECTOCELE     • TONSILECTOMY AND ADNOIDECTOMY     • TONSILLECTOMY       Social History   Social History     Substance and Sexual Activity   Alcohol Use No     Social History     Substance and Sexual Activity   Drug Use Never     Social History     Tobacco Use   Smoking Status Some Days   • Packs/day: 0 25   • Types: Cigarettes   Smokeless Tobacco Never   Tobacco Comments    using patch and gum     Family History   Problem Relation Age of Onset   • Asthma Mother    • Hypertension Father    • Ulcers Brother    • HIV Brother    • Thyroid disease Other    • Stroke Other    • Hepatitis Sister         C and B   • No Known Problems Daughter    • No Known Problems Son    • Other Maternal Grandmother         pre-diabetic   • No Known Problems Maternal Grandfather    • No Known Problems Paternal Grandmother    • Diabetes Paternal Grandfather    • No Known Problems Maternal Aunt    • No Known Problems Maternal Aunt    • No Known Problems Maternal Aunt    • No Known Problems Maternal Aunt    • No Known Problems Maternal Aunt    • No Known Problems Maternal Aunt    • No Known Problems Paternal Aunt    • No Known Problems Paternal Aunt        Meds/Allergies     (Not in a hospital admission)    No current facility-administered medications for this visit  Allergies   Allergen Reactions   • Gabapentin Hives   • Escitalopram Drowsiness   • Fluoxetine      Ringing in ears   • Sertraline GI Intolerance   • Varenicline GI Intolerance       Objective     not currently breastfeeding  There is no height or weight on file to calculate BMI  [unfilled]      PHYSICAL EXAM:      Physical Exam    Lab Results:   No visits with results within 1 Day(s) from this visit     Latest known visit with results is:   Appointment on 02/04/2022   Component Date Value   • WBC 02/04/2022 6 50    • RBC 02/04/2022 4 60    • Hemoglobin 02/04/2022 13 8    • Hematocrit 02/04/2022 42 0    • MCV 02/04/2022 91    • MCH 02/04/2022 30 0    • MCHC 02/04/2022 32 9    • RDW 02/04/2022 13 5    • MPV 02/04/2022 8 4 (L)    • Platelets 57/35/0675 191    • Neutrophils Relative 02/04/2022 65    • Lymphocytes Relative 02/04/2022 27    • Monocytes Relative 02/04/2022 7    • Eosinophils Relative 02/04/2022 1    • Basophils Relative 02/04/2022 0    • Neutrophils Absolute 02/04/2022 4 20    • Lymphocytes Absolute 02/04/2022 1 70    • Monocytes Absolute 02/04/2022 0 50    • Eosinophils Absolute 02/04/2022 0 10    • Basophils Absolute 02/04/2022 0 00    • Hemoglobin A1C 02/04/2022 5 7 (H)    • EAG 02/04/2022 117    • Cholesterol 02/04/2022 192    • Triglycerides 02/04/2022 93    • HDL, Direct 02/04/2022 57    • LDL Calculated 02/04/2022 116    • Sodium 02/04/2022 139    • Potassium 02/04/2022 4 0    • Chloride 02/04/2022 105    • CO2 02/04/2022 29    • ANION GAP 02/04/2022 5    • BUN 02/04/2022 12    • Creatinine 02/04/2022 0 64    • Glucose, Fasting 02/04/2022 90    • Calcium 02/04/2022 9 2    • AST 02/04/2022 30    • ALT 02/04/2022 20    • Alkaline Phosphatase 02/04/2022 87    • Total Protein 02/04/2022 7 9    • Albumin 02/04/2022 4 4    • Total Bilirubin 02/04/2022 0 29    • eGFR 02/04/2022 99        Imaging Studies: I have personally reviewed pertinent imaging studies  [facility]    Gastroenterology Fellow  PGY-4  Available via [employer]  3/1/2023 3:20 PM

## 2023-03-01 NOTE — PATIENT INSTRUCTIONS
Scheduled date of colonoscopy/egd (as of today): 4/3/23  Physician performing colonoscopy:  Dr Tabitha Robledo  Location of colonoscopy: [facility]  Bowel prep reviewed with patient: zoraida/dulcolax  Instructions reviewed with patient by: Francisco Javier Good   Clearances:   n/a      **colon from 3/20 had to be reschedule since not enough time to add egd**

## 2023-03-15 ENCOUNTER — APPOINTMENT (OUTPATIENT)
Dept: LAB | Facility: CLINIC | Age: 58
End: 2023-03-15

## 2023-03-15 DIAGNOSIS — R73.03 PREDIABETES: ICD-10-CM

## 2023-03-15 DIAGNOSIS — Z00.00 ANNUAL PHYSICAL EXAM: ICD-10-CM

## 2023-03-15 LAB
ALBUMIN SERPL BCP-MCNC: 4 G/DL (ref 3.5–5)
ALP SERPL-CCNC: 90 U/L (ref 46–116)
ALT SERPL W P-5'-P-CCNC: 18 U/L (ref 12–78)
ANION GAP SERPL CALCULATED.3IONS-SCNC: -1 MMOL/L (ref 4–13)
AST SERPL W P-5'-P-CCNC: 17 U/L (ref 5–45)
BILIRUB SERPL-MCNC: 0.36 MG/DL (ref 0.2–1)
BUN SERPL-MCNC: 11 MG/DL (ref 5–25)
CALCIUM SERPL-MCNC: 10 MG/DL (ref 8.3–10.1)
CHLORIDE SERPL-SCNC: 110 MMOL/L (ref 96–108)
CHOLEST SERPL-MCNC: 178 MG/DL
CO2 SERPL-SCNC: 32 MMOL/L (ref 21–32)
CREAT SERPL-MCNC: 0.77 MG/DL (ref 0.6–1.3)
EST. AVERAGE GLUCOSE BLD GHB EST-MCNC: 120 MG/DL
GFR SERPL CREATININE-BSD FRML MDRD: 85 ML/MIN/1.73SQ M
GLUCOSE P FAST SERPL-MCNC: 93 MG/DL (ref 65–99)
HBA1C MFR BLD: 5.8 %
HDLC SERPL-MCNC: 60 MG/DL
LDLC SERPL CALC-MCNC: 91 MG/DL (ref 0–100)
NONHDLC SERPL-MCNC: 118 MG/DL
POTASSIUM SERPL-SCNC: 4.1 MMOL/L (ref 3.5–5.3)
PROT SERPL-MCNC: 7.2 G/DL (ref 6.4–8.4)
SODIUM SERPL-SCNC: 141 MMOL/L (ref 135–147)
TRIGL SERPL-MCNC: 136 MG/DL

## 2023-03-28 DIAGNOSIS — F41.9 ANXIETY: ICD-10-CM

## 2023-03-28 RX ORDER — HYDROXYZINE HYDROCHLORIDE 25 MG/1
25 TABLET, FILM COATED ORAL EVERY 8 HOURS PRN
Qty: 20 TABLET | Refills: 0 | Status: SHIPPED | OUTPATIENT
Start: 2023-03-28

## 2023-03-31 ENCOUNTER — TELEPHONE (OUTPATIENT)
Dept: GASTROENTEROLOGY | Facility: MEDICAL CENTER | Age: 58
End: 2023-03-31

## 2023-03-31 NOTE — TELEPHONE ENCOUNTER
Left voice message for patient confirming upcoming procedure  Patient was instructed to call [phone] if they have any questions or concerns about the prep instructions or if they need to change or cancel the procedure

## 2023-04-02 RX ORDER — SODIUM CHLORIDE, SODIUM LACTATE, POTASSIUM CHLORIDE, CALCIUM CHLORIDE 600; 310; 30; 20 MG/100ML; MG/100ML; MG/100ML; MG/100ML
50 INJECTION, SOLUTION INTRAVENOUS CONTINUOUS
Status: CANCELLED | OUTPATIENT
Start: 2023-04-02

## 2023-04-03 ENCOUNTER — HOSPITAL ENCOUNTER (OUTPATIENT)
Dept: GASTROENTEROLOGY | Facility: HOSPITAL | Age: 58
Setting detail: OUTPATIENT SURGERY
Discharge: HOME | End: 2023-04-03
Attending: INTERNAL MEDICINE | Admitting: INTERNAL MEDICINE

## 2023-04-03 ENCOUNTER — ANESTHESIA EVENT (OUTPATIENT)
Dept: GASTROENTEROLOGY | Facility: HOSPITAL | Age: 58
End: 2023-04-03

## 2023-04-03 ENCOUNTER — ANESTHESIA (OUTPATIENT)
Dept: GASTROENTEROLOGY | Facility: HOSPITAL | Age: 58
End: 2023-04-03

## 2023-04-03 VITALS
RESPIRATION RATE: 18 BRPM | BODY MASS INDEX: 20.64 KG/M2 | WEIGHT: 98.33 LBS | OXYGEN SATURATION: 98 % | HEIGHT: 58 IN | HEART RATE: 73 BPM | SYSTOLIC BLOOD PRESSURE: 120 MMHG | DIASTOLIC BLOOD PRESSURE: 72 MMHG | TEMPERATURE: 97 F

## 2023-04-03 DIAGNOSIS — K21.9 GASTROESOPHAGEAL REFLUX DISEASE, UNSPECIFIED WHETHER ESOPHAGITIS PRESENT: ICD-10-CM

## 2023-04-03 DIAGNOSIS — Z12.11 SCREENING FOR COLON CANCER: ICD-10-CM

## 2023-04-03 RX ORDER — LIDOCAINE HYDROCHLORIDE 10 MG/ML
INJECTION, SOLUTION EPIDURAL; INFILTRATION; INTRACAUDAL; PERINEURAL AS NEEDED
Status: DISCONTINUED | OUTPATIENT
Start: 2023-04-03 | End: 2023-04-03

## 2023-04-03 RX ORDER — PROPOFOL 10 MG/ML
INJECTION, EMULSION INTRAVENOUS CONTINUOUS PRN
Status: DISCONTINUED | OUTPATIENT
Start: 2023-04-03 | End: 2023-04-03

## 2023-04-03 RX ORDER — PROPOFOL 10 MG/ML
INJECTION, EMULSION INTRAVENOUS AS NEEDED
Status: DISCONTINUED | OUTPATIENT
Start: 2023-04-03 | End: 2023-04-03

## 2023-04-03 RX ORDER — SODIUM CHLORIDE, SODIUM LACTATE, POTASSIUM CHLORIDE, CALCIUM CHLORIDE 600; 310; 30; 20 MG/100ML; MG/100ML; MG/100ML; MG/100ML
50 INJECTION, SOLUTION INTRAVENOUS CONTINUOUS
Status: DISCONTINUED | OUTPATIENT
Start: 2023-04-03 | End: 2023-04-07 | Stop reason: HOSPADM

## 2023-04-03 RX ADMIN — PROPOFOL 100 MCG/KG/MIN: 10 INJECTION, EMULSION INTRAVENOUS at 12:08

## 2023-04-03 RX ADMIN — SIMETHICONE 40 MG: 20 EMULSION ORAL at 12:16

## 2023-04-03 RX ADMIN — PROPOFOL 30 MG: 10 INJECTION, EMULSION INTRAVENOUS at 12:02

## 2023-04-03 RX ADMIN — PROPOFOL 50 MG: 10 INJECTION, EMULSION INTRAVENOUS at 12:05

## 2023-04-03 RX ADMIN — LIDOCAINE HYDROCHLORIDE 50 MG: 10 INJECTION, SOLUTION EPIDURAL; INFILTRATION; INTRACAUDAL at 12:00

## 2023-04-03 RX ADMIN — PROPOFOL 120 MG: 10 INJECTION, EMULSION INTRAVENOUS at 12:00

## 2023-04-03 RX ADMIN — PROPOFOL 50 MG: 10 INJECTION, EMULSION INTRAVENOUS at 12:03

## 2023-04-03 RX ADMIN — SODIUM CHLORIDE, SODIUM LACTATE, POTASSIUM CHLORIDE, AND CALCIUM CHLORIDE 50 ML/HR: .6; .31; .03; .02 INJECTION, SOLUTION INTRAVENOUS at 10:44

## 2023-04-03 RX ADMIN — PROPOFOL 50 MG: 10 INJECTION, EMULSION INTRAVENOUS at 12:10

## 2023-04-03 NOTE — ANESTHESIA PREPROCEDURE EVALUATION
Procedure:  COLONOSCOPY  EGD    Relevant Problems   GI/HEPATIC   (+) Gastroesophageal reflux disease      MUSCULOSKELETAL   (+) Chronic bilateral low back pain without sciatica   (+) Myofascial pain syndrome      NEURO/PSYCH   (+) Anxiety   (+) Chronic bilateral low back pain without sciatica   (+) Depression   (+) Myofascial pain syndrome   (+) Paresthesia of lower extremity   (+) TIA (transient ischemic attack)      PULMONARY   (+) Current smoker      Nervous and Auditory   (+) Benign paroxysmal positional vertigo      Other   (+) Tobacco dependence        Physical Exam    Airway    Mallampati score: II         Dental   upper dentures,     Cardiovascular  Cardiovascular exam normal    Pulmonary  Pulmonary exam normal     Other Findings        Anesthesia Plan  ASA Score- 2     Anesthesia Type- IV sedation with anesthesia with ASA Monitors  Additional Monitors:   Airway Plan:           Plan Factors-Exercise tolerance (METS): >4 METS  Chart reviewed  EKG reviewed  Existing labs reviewed  Patient summary reviewed  Patient is a current smoker  Patient instructed to abstain from smoking on day of procedure  Patient did not smoke on day of surgery  Induction-     Postoperative Plan-     Informed Consent- Anesthetic plan and risks discussed with patient  I personally reviewed this patient with the CRNA  Discussed and agreed on the Anesthesia Plan with the CRNA             Lab Results   Component Value Date    HGBA1C 5 8 (H) 03/15/2023       Lab Results   Component Value Date     04/10/2018    K 4 1 03/15/2023     (H) 03/15/2023    CO2 32 03/15/2023    ANIONGAP 13 04/10/2018    BUN 11 03/15/2023    CREATININE 0 77 03/15/2023    GLUCOSE 87 04/10/2018    GLUF 93 03/15/2023    CALCIUM 10 0 03/15/2023    AST 17 03/15/2023    ALT 18 03/15/2023    ALKPHOS 90 03/15/2023    PROT 7 9 04/10/2018    BILITOT 0 3 04/10/2018    EGFR 85 03/15/2023       Lab Results   Component Value Date    WBC 6 50 02/04/2022    HGB 13 8 02/04/2022    HCT 42 0 02/04/2022    MCV 91 02/04/2022     02/04/2022

## 2023-04-03 NOTE — H&P
History and Physical - [facility]  Karon Melissa 61 y o  female MRN: [id number]    HPI: Karon Melissa is a 61y o  year old female w hx of GERD, TIA, BPPV, hx of H pylori (treated twice w abx, cleared on subsequent biopsies, epigastric pain, GERD, cholecystectomy presenting for EGD and colonoscopy  Last EGD , biopsies negative for H  Pylori   Last colonoscopy , reportedly normal      Last Hg   Lab Results   Component Value Date    HGB 13 8 2022     Last INR   Lab Results   Component Value Date    INR 1 07 2020       Review of Systems    Historical Information   Past Medical History:   Diagnosis Date   • Anxiety    • Cervical radiculopathy    • Cervical spinal stenosis    • Depression    • GERD (gastroesophageal reflux disease)    • Hiatal hernia    • History of transfusion    • Myofascial pain syndrome    • Polyarthralgia    • TIA (transient ischemic attack)    • Vertigo      Past Surgical History:   Procedure Laterality Date   • CARPAL TUNNEL RELEASE     •  SECTION     • CHOLECYSTECTOMY     • COLONOSCOPY     • ECTOPIC PREGNANCY SURGERY     • HYSTERECTOMY      Total unilateral removal tube and ovary   • LAPAROSCOPY     • OOPHORECTOMY      has one ovary   • [place] BLEPHAROPLASTY LOWER EYELID Bilateral 2020    Procedure: BLEPHAROPLASTY LOWER;  Surgeon: Lady Clinton MD;  Location: [address] [place] [address];  Service: Plastics   • [place] EXCISION GANGLION WRIST DORSAL/VOLAR PRIMARY Left 2020    Procedure: EXCISION GANGLION CYST;  Surgeon: Lady Clinton MD;  Location: [address] [place] [address];  Service: Plastics   • PUBOVAGINAL SLING     • REPAIR RECTOCELE     • TONSILECTOMY AND ADNOIDECTOMY     • TONSILLECTOMY     • UPPER GASTROINTESTINAL ENDOSCOPY       Social History   Social History     Substance and Sexual Activity   Alcohol Use No     Social History     Substance and Sexual Activity   Drug Use Never     Social History     Tobacco Use   Smoking Status Some Days   • Packs/day: "0 25   • Types: Cigarettes   Smokeless Tobacco Never   Tobacco Comments    using patch and gum     Family History   Problem Relation Age of Onset   • Asthma Mother    • Hypertension Father    • Ulcers Brother    • HIV Brother    • Thyroid disease Other    • Stroke Other    • Hepatitis Sister         C and B   • No Known Problems Daughter    • No Known Problems Son    • Other Maternal Grandmother         pre-diabetic   • No Known Problems Maternal Grandfather    • No Known Problems Paternal Grandmother    • Diabetes Paternal Grandfather    • No Known Problems Maternal Aunt    • No Known Problems Maternal Aunt    • No Known Problems Maternal Aunt    • No Known Problems Maternal Aunt    • No Known Problems Maternal Aunt    • No Known Problems Maternal Aunt    • No Known Problems Paternal Aunt    • No Known Problems Paternal Aunt        Meds/Allergies     (Not in a hospital admission)      Allergies   Allergen Reactions   • Gabapentin Hives   • Escitalopram Drowsiness   • Fluoxetine      Ringing in ears   • Sertraline GI Intolerance   • Varenicline GI Intolerance       Objective     /68 (BP Location: Left arm)   Pulse 78   Temp (!) 97 °F (36 1 °C) (Temporal)   Resp 18   Ht 4' 9 75\" (1 467 m)   Wt 44 6 kg (98 lb 5 2 oz)   LMP  (LMP Unknown)   SpO2 99%   BMI 20 73 kg/m²     PHYSICAL EXAM    Gen: NAD  CV: RRR  CHEST: Clear  ABD: soft, NT/ND  EXT: no edema  Neuro: AAO    ASSESSMENT/PLAN:  This is a 61y o  year old female here for EGD, colonoscopy    Plan/Procedure: EGD, colonoscopy      "

## 2023-04-03 NOTE — ANESTHESIA POSTPROCEDURE EVALUATION
Post-Op Assessment Note    CV Status:  Stable    Pain management: adequate     Mental Status:  Sleepy and awake   Hydration Status:  Euvolemic   PONV Controlled:  Controlled   Airway Patency:  Patent      Post Op Vitals Reviewed: Yes      Staff: CRNA, Anesthesiologist         No notable events documented      /72 (04/03/23 1253)    Temp (!) 97 °F (36 1 °C) (04/03/23 1253)    Pulse 73 (04/03/23 1253)   Resp 18 (04/03/23 1253)    SpO2 98 % (04/03/23 1253)

## 2023-04-04 ENCOUNTER — NURSE TRIAGE (OUTPATIENT)
Dept: OTHER | Facility: OTHER | Age: 58
End: 2023-04-04

## 2023-04-04 DIAGNOSIS — K21.9 GASTROESOPHAGEAL REFLUX DISEASE, UNSPECIFIED WHETHER ESOPHAGITIS PRESENT: ICD-10-CM

## 2023-04-04 PROBLEM — K63.5 POLYP OF SIGMOID COLON: Status: ACTIVE | Noted: 2023-04-04

## 2023-04-04 RX ORDER — OMEPRAZOLE 40 MG/1
40 CAPSULE, DELAYED RELEASE ORAL DAILY
Qty: 30 CAPSULE | Refills: 0 | Status: SHIPPED | OUTPATIENT
Start: 2023-04-04

## 2023-04-04 RX ORDER — FAMOTIDINE 40 MG/1
40 TABLET, FILM COATED ORAL DAILY PRN
Qty: 90 TABLET | Refills: 0 | Status: SHIPPED | OUTPATIENT
Start: 2023-04-04

## 2023-04-04 NOTE — TELEPHONE ENCOUNTER
"Med request sent to pharmacy  Reason for Disposition  • [1] Caller requesting a prescription renewal (no refills left), no triage required, AND [2] triager able to renew prescription per department policy    Answer Assessment - Initial Assessment Questions  1  DRUG NAME: \"What medicine do you need to have refilled? \"      Famotidine, Omeprazole    Protocols used: MEDICATION REFILL AND RENEWAL CALL-ADULT-    "

## 2023-04-04 NOTE — TELEPHONE ENCOUNTER
Regarding: Med refill  ----- Message from Mikala Gee sent at 4/4/2023  1:47 PM EDT -----  Medication Refill Request     Name Famotidine  Dose/Frequency 40mg daily PRN  Quantity 90  Verified pharmacy   YES  Verified ordering Provider   YES  Does patient have enough for the next 3 days? NO    Medication Refill Request     Name Omeprazole  Dose/Frequency 40mg daily  Quantity 90  Verified pharmacy   YES  Verified ordering Provider   YES  Does patient have enough for the next 3 days?  NO     [facility] [address]   Phone:  [phone]  Fax:  [phone]

## 2023-04-05 ENCOUNTER — TELEPHONE (OUTPATIENT)
Dept: GASTROENTEROLOGY | Facility: CLINIC | Age: 58
End: 2023-04-05

## 2023-04-05 NOTE — TELEPHONE ENCOUNTER
Called and lvm for the patient to schedule a f/u visit 6-8 weeks out in our [place] location

## 2023-04-05 NOTE — RESULT ENCOUNTER NOTE
Results relayed via Mychart    Multiple small tubular adenomas    Repeat colonoscopy in 3 years  Otherwise unremarkable biopsies    Schedule office follow-up at the fellows clinic in [facility] in 6 to 8 weeks

## 2023-04-05 NOTE — TELEPHONE ENCOUNTER
----- Message from Tere Shepherd sent at 4/5/2023 12:44 PM EDT -----    ----- Message -----  From: Dorcas Mckinley MD  Sent: 4/5/2023  11:58 AM EDT  To: Yeison Natarajan MD, #    Results relayed via [employer]    Multiple small tubular adenomas    Repeat colonoscopy in 3 years  Otherwise unremarkable biopsies    Schedule office follow-up at the [facility] in [place] in 6 to 8 weeks

## 2023-05-09 ENCOUNTER — NURSE TRIAGE (OUTPATIENT)
Dept: OTHER | Facility: OTHER | Age: 58
End: 2023-05-09

## 2023-05-09 DIAGNOSIS — K21.9 GASTROESOPHAGEAL REFLUX DISEASE, UNSPECIFIED WHETHER ESOPHAGITIS PRESENT: ICD-10-CM

## 2023-05-09 RX ORDER — OMEPRAZOLE 40 MG/1
40 CAPSULE, DELAYED RELEASE ORAL DAILY
Qty: 90 CAPSULE | Refills: 0 | Status: SHIPPED | OUTPATIENT
Start: 2023-05-09

## 2023-05-09 NOTE — TELEPHONE ENCOUNTER
Medication sent through [employer] protocols  Passed all protocols; med approved  Verified confirmation receipt by pharmacy

## 2023-05-09 NOTE — TELEPHONE ENCOUNTER
Reason for Disposition  • Caller requesting a prescription renewal (no refills left), no triage required, and triager able to refill per department policy    Protocols used: MEDICATION QUESTION CALL-ADULT-[place]

## 2023-05-09 NOTE — TELEPHONE ENCOUNTER
Regarding: Med refill  ----- Message from Robin Weinberg sent at 5/9/2023  1:11 PM EDT -----  Medication Refill Request     Name Prilosec   Dose/Frequency 40mg daily  Quantity 90  Verified pharmacy   YES  Verified ordering Provider   YES  Does patient have enough for the next 3 days?  NO    [facility] [address]   Phone:  [phone]  Fax:  [phone]

## 2023-06-14 ENCOUNTER — APPOINTMENT (EMERGENCY)
Dept: CT IMAGING | Facility: HOSPITAL | Age: 58
End: 2023-06-14
Payer: COMMERCIAL

## 2023-06-14 ENCOUNTER — APPOINTMENT (EMERGENCY)
Dept: RADIOLOGY | Facility: HOSPITAL | Age: 58
End: 2023-06-14
Payer: COMMERCIAL

## 2023-06-14 ENCOUNTER — HOSPITAL ENCOUNTER (EMERGENCY)
Facility: HOSPITAL | Age: 58
Discharge: HOME | End: 2023-06-14
Attending: INTERNAL MEDICINE
Payer: COMMERCIAL

## 2023-06-14 VITALS
OXYGEN SATURATION: 99 % | SYSTOLIC BLOOD PRESSURE: 137 MMHG | RESPIRATION RATE: 16 BRPM | DIASTOLIC BLOOD PRESSURE: 84 MMHG | TEMPERATURE: 97.8 F | HEART RATE: 67 BPM

## 2023-06-14 DIAGNOSIS — R53.1 WEAKNESS: ICD-10-CM

## 2023-06-14 DIAGNOSIS — R42 DIZZINESS: Primary | ICD-10-CM

## 2023-06-14 LAB
ALBUMIN SERPL BCP-MCNC: 5 G/DL (ref 3.5–5)
ALP SERPL-CCNC: 101 U/L (ref 34–104)
ALT SERPL W P-5'-P-CCNC: 10 U/L (ref 7–52)
ANION GAP SERPL CALCULATED.3IONS-SCNC: 8 MMOL/L (ref 4–13)
AST SERPL W P-5'-P-CCNC: 18 U/L (ref 13–39)
ATRIAL RATE: 82 BPM
BASOPHILS # BLD AUTO: 0.01 THOUSANDS/ÂΜL (ref 0–0.1)
BASOPHILS NFR BLD AUTO: 0 % (ref 0–1)
BILIRUB SERPL-MCNC: 0.37 MG/DL (ref 0.2–1)
BUN SERPL-MCNC: 11 MG/DL (ref 5–25)
CALCIUM SERPL-MCNC: 10.1 MG/DL (ref 8.4–10.2)
CARDIAC TROPONIN I PNL SERPL HS: 4 NG/L
CHLORIDE SERPL-SCNC: 105 MMOL/L (ref 96–108)
CO2 SERPL-SCNC: 29 MMOL/L (ref 21–32)
CREAT SERPL-MCNC: 0.72 MG/DL (ref 0.6–1.3)
EOSINOPHIL # BLD AUTO: 0.07 THOUSAND/ÂΜL (ref 0–0.61)
EOSINOPHIL NFR BLD AUTO: 1 % (ref 0–6)
ERYTHROCYTE [DISTWIDTH] IN BLOOD BY AUTOMATED COUNT: 13.6 % (ref 11.6–15.1)
GFR SERPL CREATININE-BSD FRML MDRD: 92 ML/MIN/1.73SQ M
GLUCOSE SERPL-MCNC: 82 MG/DL (ref 65–140)
GLUCOSE SERPL-MCNC: 97 MG/DL (ref 65–140)
HCT VFR BLD AUTO: 44.1 % (ref 34.8–46.1)
HGB BLD-MCNC: 14.2 G/DL (ref 11.5–15.4)
IMM GRANULOCYTES # BLD AUTO: 0.01 THOUSAND/UL (ref 0–0.2)
IMM GRANULOCYTES NFR BLD AUTO: 0 % (ref 0–2)
LYMPHOCYTES # BLD AUTO: 1.96 THOUSANDS/ÂΜL (ref 0.6–4.47)
LYMPHOCYTES NFR BLD AUTO: 26 % (ref 14–44)
MCH RBC QN AUTO: 29.7 PG (ref 26.8–34.3)
MCHC RBC AUTO-ENTMCNC: 32.2 G/DL (ref 31.4–37.4)
MCV RBC AUTO: 92 FL (ref 82–98)
MONOCYTES # BLD AUTO: 0.31 THOUSAND/ÂΜL (ref 0.17–1.22)
MONOCYTES NFR BLD AUTO: 4 % (ref 4–12)
NEUTROPHILS # BLD AUTO: 5.26 THOUSANDS/ÂΜL (ref 1.85–7.62)
NEUTS SEG NFR BLD AUTO: 69 % (ref 43–75)
NRBC BLD AUTO-RTO: 0 /100 WBCS
P AXIS: 63 DEGREES
PLATELET # BLD AUTO: 177 THOUSANDS/UL (ref 149–390)
PMV BLD AUTO: 9.6 FL (ref 8.9–12.7)
POTASSIUM SERPL-SCNC: 3.8 MMOL/L (ref 3.5–5.3)
PR INTERVAL: 172 MS
PROT SERPL-MCNC: 8 G/DL (ref 6.4–8.4)
QRS AXIS: 58 DEGREES
QRSD INTERVAL: 68 MS
QT INTERVAL: 372 MS
QTC INTERVAL: 434 MS
RBC # BLD AUTO: 4.78 MILLION/UL (ref 3.81–5.12)
SODIUM SERPL-SCNC: 142 MMOL/L (ref 135–147)
T WAVE AXIS: 60 DEGREES
TSH SERPL DL<=0.05 MIU/L-ACNC: 0.91 UIU/ML (ref 0.45–4.5)
VENTRICULAR RATE: 82 BPM
WBC # BLD AUTO: 7.62 THOUSAND/UL (ref 4.31–10.16)

## 2023-06-14 PROCEDURE — 84484 ASSAY OF TROPONIN QUANT: CPT | Performed by: INTERNAL MEDICINE

## 2023-06-14 PROCEDURE — 93010 ELECTROCARDIOGRAM REPORT: CPT

## 2023-06-14 PROCEDURE — 80053 COMPREHEN METABOLIC PANEL: CPT | Performed by: INTERNAL MEDICINE

## 2023-06-14 PROCEDURE — 85025 COMPLETE CBC W/AUTO DIFF WBC: CPT | Performed by: INTERNAL MEDICINE

## 2023-06-14 PROCEDURE — 70496 CT ANGIOGRAPHY HEAD: CPT

## 2023-06-14 PROCEDURE — 93005 ELECTROCARDIOGRAM TRACING: CPT

## 2023-06-14 PROCEDURE — 70498 CT ANGIOGRAPHY NECK: CPT

## 2023-06-14 PROCEDURE — 71045 X-RAY EXAM CHEST 1 VIEW: CPT

## 2023-06-14 PROCEDURE — 84443 ASSAY THYROID STIM HORMONE: CPT | Performed by: INTERNAL MEDICINE

## 2023-06-14 PROCEDURE — G1004 CDSM NDSC: HCPCS

## 2023-06-14 PROCEDURE — 36415 COLL VENOUS BLD VENIPUNCTURE: CPT | Performed by: INTERNAL MEDICINE

## 2023-06-14 PROCEDURE — 82948 REAGENT STRIP/BLOOD GLUCOSE: CPT

## 2023-06-14 RX ORDER — DIAZEPAM 2 MG/1
2 TABLET ORAL ONCE
Status: COMPLETED | OUTPATIENT
Start: 2023-06-14 | End: 2023-06-14

## 2023-06-14 RX ORDER — MECLIZINE HYDROCHLORIDE 25 MG/1
25 TABLET ORAL 3 TIMES DAILY PRN
Qty: 30 TABLET | Refills: 0 | Status: SHIPPED | OUTPATIENT
Start: 2023-06-14

## 2023-06-14 RX ORDER — KETOROLAC TROMETHAMINE 30 MG/ML
15 INJECTION, SOLUTION INTRAMUSCULAR; INTRAVENOUS ONCE
Status: COMPLETED | OUTPATIENT
Start: 2023-06-14 | End: 2023-06-14

## 2023-06-14 RX ORDER — MECLIZINE HCL 12.5 MG/1
25 TABLET ORAL ONCE
Status: COMPLETED | OUTPATIENT
Start: 2023-06-14 | End: 2023-06-14

## 2023-06-14 RX ORDER — DIAZEPAM 5 MG/1
5 TABLET ORAL ONCE
Status: DISCONTINUED | OUTPATIENT
Start: 2023-06-14 | End: 2023-06-14

## 2023-06-14 RX ADMIN — DIAZEPAM 2 MG: 2 TABLET ORAL at 17:38

## 2023-06-14 RX ADMIN — MECLIZINE 25 MG: 12.5 TABLET ORAL at 16:11

## 2023-06-14 RX ADMIN — IOHEXOL 100 ML: 350 INJECTION, SOLUTION INTRAVENOUS at 16:27

## 2023-06-14 RX ADMIN — SODIUM CHLORIDE 1000 ML: 0.9 INJECTION, SOLUTION INTRAVENOUS at 16:07

## 2023-06-14 RX ADMIN — KETOROLAC TROMETHAMINE 15 MG: 30 INJECTION, SOLUTION INTRAMUSCULAR at 17:14

## 2023-06-14 NOTE — Clinical Note
Jim Danyel was seen and treated in our emergency department on 6/14/2023  Diagnosis:     Karma Leigh  is off the rest of the shift today  She may return on this date: 06/15/2023         If you have any questions or concerns, please don't hesitate to call        Nish Lovelace MD    ______________________________           _______________          _______________  [facility] Representative                              Date                                Time

## 2023-06-14 NOTE — ED PROVIDER NOTES
History  Chief Complaint   Patient presents with   • Dizziness     Pt reports feeling dizzy and anxious, sudden onset approximately 15 minutes ago     HPI  48-year-old woman presents to ED for evaluation of dizziness, anxiety, dyspnea and generalized weakness  Patient reports she was at work when she all of a sudden only felt the room spinning  It was sudden onset  She reports continued symptoms  She states she has had vertigo in the past but this feels more severe and different  She reports associated dyspnea and anxiety  She states she felt weak all over especially in her knees  Patient denies headache, visual changes, fevers, chills, chest pain, palpitations, nausea, vomiting abdominal pain, diarrhea, dysuria, new skin rashes or numbness or tingling of the extremities  She denies any changes in medications  She denies any sick contacts  Prior to Admission Medications   Prescriptions Last Dose Informant Patient Reported? Taking?    buPROPion (ZYBAN) 150 MG 12 hr tablet  Self No No   Sig: Take 1 tablet (150 mg total) by mouth 2 (two) times a day   busPIRone (BUSPAR) 5 mg tablet  Self No No   Sig: Currently 1 tab daily   May bump up if needed to twice a day   famotidine (PEPCID) 40 MG tablet   No No   Sig: Take 1 tablet (40 mg total) by mouth daily as needed for heartburn   fluticasone (FLONASE) 50 mcg/act nasal spray  Self No No   Si spray into each nostril daily   hydrOXYzine HCL (ATARAX) 25 mg tablet   No No   Sig: Take 1 tablet (25 mg total) by mouth every 8 (eight) hours as needed for anxiety   ibuprofen (MOTRIN) 600 mg tablet  Self No No   Sig: Take 1 tablet (600 mg total) by mouth every 6 (six) hours as needed for mild pain or moderate pain   loratadine (CLARITIN) 10 mg tablet  Self Yes No   Sig: Take 10 mg by mouth daily   meclizine (ANTIVERT) 25 mg tablet  Self Yes No   Si tablet every 8 (eight) hours as needed    omeprazole (PriLOSEC) 40 MG capsule   No No   Sig: Take 1 capsule (40 mg total) by mouth daily      Facility-Administered Medications: None       Past Medical History:   Diagnosis Date   • Anxiety    • Cervical radiculopathy    • Cervical spinal stenosis    • Depression    • GERD (gastroesophageal reflux disease)    • Hiatal hernia    • History of transfusion    • Myofascial pain syndrome    • Polyarthralgia    • TIA (transient ischemic attack)    • Vertigo        Past Surgical History:   Procedure Laterality Date   • CARPAL TUNNEL RELEASE     •  SECTION     • CHOLECYSTECTOMY     • COLONOSCOPY     • ECTOPIC PREGNANCY SURGERY     • HYSTERECTOMY      Total unilateral removal tube and ovary   • LAPAROSCOPY     • OOPHORECTOMY      has one ovary   • ME BLEPHAROPLASTY LOWER EYELID Bilateral 2020    Procedure: BLEPHAROPLASTY LOWER;  Surgeon: Alvaro Mace MD;  Location:  MAIN OR;  Service: Plastics   • ME EXCISION GANGLION WRIST DORSAL/VOLAR PRIMARY Left 2020    Procedure: EXCISION GANGLION CYST;  Surgeon: Alvaro Mace MD;  Location: [address] [place] [address] MAIN OR;  Service: Plastics   • PUBOVAGINAL SLING     • REPAIR RECTOCELE     • TONSILECTOMY AND ADNOIDECTOMY     • TONSILLECTOMY     • UPPER GASTROINTESTINAL ENDOSCOPY         Family History   Problem Relation Age of Onset   • Asthma Mother    • Hypertension Father    • Ulcers Brother    • HIV Brother    • Thyroid disease Other    • Stroke Other    • Hepatitis Sister         C and B   • No Known Problems Daughter    • No Known Problems Son    • Other Maternal Grandmother         pre-diabetic   • No Known Problems Maternal Grandfather    • No Known Problems Paternal Grandmother    • Diabetes Paternal Grandfather    • No Known Problems Maternal Aunt    • No Known Problems Maternal Aunt    • No Known Problems Maternal Aunt    • No Known Problems Maternal Aunt    • No Known Problems Maternal Aunt    • No Known Problems Maternal Aunt    • No Known Problems Paternal Aunt    • No Known Problems Paternal Aunt      I have reviewed and agree with the history as documented  E-Cigarette/Vaping   • E-Cigarette Use Never User      E-Cigarette/Vaping Substances   • Nicotine No    • THC No    • CBD No    • Flavoring No    • Other No    • Unknown No      Social History     Tobacco Use   • Smoking status: Some Days     Packs/day: 0 25     Types: Cigarettes   • Smokeless tobacco: Never   • Tobacco comments:     using patch and gum   Vaping Use   • Vaping Use: Never used   Substance Use Topics   • Alcohol use: No   • Drug use: Never       Review of Systems   All other systems reviewed and are negative  Physical Exam  Physical Exam   Constitutional:  Well developed, well nourished, no acute distress, non-toxic appearance    HEENT:  Conjunctiva normal  Oropharynx moist  Respiratory:  No respiratory distress, normal breath sounds  Cardiovascular:  Normal rate, normal rhythm, no murmurs  GI:  Soft, nondistended, normal bowel sounds, nontender  :  No costovertebral angle tenderness   Musculoskeletal:  No edema, no tenderness, no deformities     Integument:  Well hydrated, no rash   Lymphatic:  No lymphadenopathy noted   Neurologic:  Alert & oriented, normal motor function, normal sensory function, no focal deficits noted, normal finger-to-nose, normal heel-to-shin  Psychiatric:  Speech and behavior appropriate       Vital Signs  ED Triage Vitals   Temperature Pulse Respirations Blood Pressure SpO2   06/14/23 1553 06/14/23 1539 06/14/23 1539 06/14/23 1539 06/14/23 1539   97 8 °F (36 6 °C) 84 17 155/93 100 %      Temp Source Heart Rate Source Patient Position - Orthostatic VS BP Location FiO2 (%)   06/14/23 1553 06/14/23 1539 06/14/23 1539 06/14/23 1539 --   Oral Monitor Lying Right arm       Pain Score       06/14/23 1714       6           Vitals:    06/14/23 1539 06/14/23 1614   BP: 155/93 137/84   Pulse: 84 67   Patient Position - Orthostatic VS: Lying          Visual Acuity      ED Medications  Medications   sodium chloride 0 9 % bolus 1,000 mL (0 mL Intravenous Stopped 6/14/23 1719)   meclizine (ANTIVERT) tablet 25 mg (25 mg Oral Given 6/14/23 1611)   iohexol (OMNIPAQUE) 350 MG/ML injection (SINGLE-DOSE) 100 mL (100 mL Intravenous Given 6/14/23 1627)   ketorolac (TORADOL) injection 15 mg (15 mg Intravenous Given 6/14/23 1714)   diazepam (VALIUM) tablet 2 mg (2 mg Oral Given 6/14/23 1738)       Diagnostic Studies  Results Reviewed     Procedure Component Value Units Date/Time    TSH [[id number]]  (Normal) Collected: 06/14/23 1604    Lab Status: Final result Specimen: Blood from Arm, Right Updated: 06/14/23 1647     TSH 3RD GENERATON 0 912 uIU/mL     HS Troponin 0hr (reflex protocol) [[id number]]  (Normal) Collected: 06/14/23 1604    Lab Status: Final result Specimen: Blood from Arm, Right Updated: 06/14/23 1639     hs TnI 0hr 4 ng/L     HS Troponin I 4hr [[id number]]     Lab Status: No result Specimen: Blood     HS Troponin I 2hr [[id number]]     Lab Status: No result Specimen: Blood     Comprehensive metabolic panel [[id number]] Collected: 06/14/23 1604    Lab Status: Final result Specimen: Blood from Arm, Right Updated: 06/14/23 1633     Sodium 142 mmol/L      Potassium 3 8 mmol/L      Chloride 105 mmol/L      CO2 29 mmol/L      ANION GAP 8 mmol/L      BUN 11 mg/dL      Creatinine 0 72 mg/dL      Glucose 82 mg/dL      Calcium 10 1 mg/dL      AST 18 U/L      ALT 10 U/L      Alkaline Phosphatase 101 U/L      Total Protein 8 0 g/dL      Albumin 5 0 g/dL      Total Bilirubin 0 37 mg/dL      eGFR 92 ml/min/1 73sq m     Narrative:      [place] guidelines for Chronic Kidney Disease (CKD):   •  Stage 1 with normal or high GFR (GFR > 90 mL/min/1 73 square meters)  •  Stage 2 Mild CKD (GFR = 60-89 mL/min/1 73 square meters)  •  Stage 3A Moderate CKD (GFR = 45-59 mL/min/1 73 square meters)  •  Stage 3B Moderate CKD (GFR = 30-44 mL/min/1 73 square meters)  •  Stage 4 Severe CKD (GFR = 15-29 mL/min/1 73 square meters)  •  Stage 5 End Stage CKD (GFR <15 mL/min/1 73 square meters)  Note: GFR calculation is accurate only with a steady state creatinine    CBC and differential [[id number]] Collected: 06/14/23 1604    Lab Status: Final result Specimen: Blood from Arm, Right Updated: 06/14/23 1617     WBC 7 62 Thousand/uL      RBC 4 78 Million/uL      Hemoglobin 14 2 g/dL      Hematocrit 44 1 %      MCV 92 fL      MCH 29 7 pg      MCHC 32 2 g/dL      RDW 13 6 %      MPV 9 6 fL      Platelets 640 Thousands/uL      nRBC 0 /100 WBCs      Neutrophils Relative 69 %      Immat GRANS % 0 %      Lymphocytes Relative 26 %      Monocytes Relative 4 %      Eosinophils Relative 1 %      Basophils Relative 0 %      Neutrophils Absolute 5 26 Thousands/µL      Immature Grans Absolute 0 01 Thousand/uL      Lymphocytes Absolute 1 96 Thousands/µL      Monocytes Absolute 0 31 Thousand/µL      Eosinophils Absolute 0 07 Thousand/µL      Basophils Absolute 0 01 Thousands/µL     Fingerstick Glucose (POCT) [[id number]]  (Normal) Collected: 06/14/23 1601    Lab Status: Final result Updated: 06/14/23 1610     POC Glucose 97 mg/dl                  XR chest 1 view   ED Interpretation by Juanita Larry MD (06/14 1659)   No acute cardiopulmonary disease      CTA head and neck with and without contrast   Final Result by NILDA Gillette MD (06/14 1652)      No acute intracranial pathology  No significant stenosis of the cervical carotid or vertebral arteries  No significant intracranial stenosis, large vessel occlusion or aneurysm  Workstation performed: [id number]                    Procedures  Procedures         ED Course  ED Course as of 06/14/23 1831   Wed Jun 14, 2023   1542 EKG Interpretation    Rate: 82 BPM  Rhythm: NSR  Axis: normal  Intervals: Normal, no blocks, QTc 434ms  Q waves: normal  T waves: normal  ST segments: normal    Impression: abnormal EKG  EKG for comparison: unavailable   EKG interpreted by me      1657 WBC: 7 62   1657 Hemoglobin: 14 2   1657 Creatinine: 0 72   1657 Glucose, Random: 82   1657 eGFR: 92   1657 hs TnI 0hr: 4   1658 CTA: No acute intracranial pathology  No significant stenosis of the cervical carotid or vertebral arteries  No significant intracranial stenosis, large vessel occlusion or aneurysm  SBIRT 20yo+    Flowsheet Row Most Recent Value   Initial Alcohol Screen: US AUDIT-C     1  How often do you have a drink containing alcohol? 0 Filed at: 06/14/2023 1543   2  How many drinks containing alcohol do you have on a typical day you are drinking? 0 Filed at: 06/14/2023 1543   3b  FEMALE Any Age, or MALE 65+: How often do you have 4 or more drinks on one occassion? 0 Filed at: 06/14/2023 1543   Audit-C Score 0 Filed at: 06/14/2023 1543   DARLEEN: How many times in the past year have you    Used an illegal drug or used a prescription medication for non-medical reasons? Never Filed at: 06/14/2023 1543                    Medical Decision Making  59-year-old woman presents to ED for evaluation of dizziness, anxiety, dyspnea and generalized weakness  Differential diagnosis includes metabolic disturbance, cardiac arrhythmia, ACS, pneumonia, pneumothorax, hemothorax, intracranial hemorrhage, intracranial abnormality, anemia, dehydration, orthostatic  Prior to discharge, patient reported improvement of her dizziness and weakness  However she continued to feel anxious and reported bilateral leg pain  We will give patient Valium and Toradol and reassess prior to discharge  Patient will not be driving home, she has her significant other picking her up  Discussed the importance of not operating vehicles when taking medications like    Dizziness: acute illness or injury  Weakness: acute illness or injury  Amount and/or Complexity of Data Reviewed  External Data Reviewed: labs, radiology and notes  Labs: ordered  Decision-making details documented in ED Course    Radiology: ordered and independent interpretation performed  Risk  Prescription drug management  Disposition  Final diagnoses:   Dizziness   Weakness     Time reflects when diagnosis was documented in both MDM as applicable and the Disposition within this note     Time User Action Codes Description Comment    6/14/2023  5:16 PM Truelinette Frances Add [R42] Dizziness     6/14/2023  5:16 PM Truelinette Frances Add [R53 1] Weakness       ED Disposition     ED Disposition   Discharge    Condition   Stable    Date/Time   Wed Jun 14, 2023  5:16 PM    Comment   Marisela Eric discharge to home/self care  Follow-up Information     Follow up With Specialties Details Why Contact Info    Linda James, [address] Nurse Practitioner Call  As needed [address]  [phone]            Discharge Medication List as of 6/14/2023  5:17 PM      START taking these medications    Details   !! meclizine (ANTIVERT) 25 mg tablet Take 1 tablet (25 mg total) by mouth 3 (three) times a day as needed for dizziness, Starting Wed 6/14/2023, Normal       !! - Potential duplicate medications found  Please discuss with provider        CONTINUE these medications which have NOT CHANGED    Details   buPROPion (ZYBAN) 150 MG 12 hr tablet Take 1 tablet (150 mg total) by mouth 2 (two) times a day, Starting Mon 2/6/2023, Normal      busPIRone (BUSPAR) 5 mg tablet Currently 1 tab daily   May bump up if needed to twice a day, No Print      famotidine (PEPCID) 40 MG tablet Take 1 tablet (40 mg total) by mouth daily as needed for heartburn, Starting Tue 4/4/2023, Normal      fluticasone (FLONASE) 50 mcg/act nasal spray 1 spray into each nostril daily, Starting Fri 9/17/2021, Print      hydrOXYzine HCL (ATARAX) 25 mg tablet Take 1 tablet (25 mg total) by mouth every 8 (eight) hours as needed for anxiety, Starting Tue 3/28/2023, Normal      ibuprofen (MOTRIN) 600 mg tablet Take 1 tablet (600 mg total) by mouth every 6 (six) hours as needed for mild pain or moderate pain, Starting Fri 9/17/2021, Print      loratadine (CLARITIN) 10 mg tablet Take 10 mg by mouth daily, Historical Med      !! meclizine (ANTIVERT) 25 mg tablet 1 tablet every 8 (eight) hours as needed , Starting Tue 2/14/2017, Historical Med      omeprazole (PriLOSEC) 40 MG capsule Take 1 capsule (40 mg total) by mouth daily, Starting Tue 5/9/2023, Normal       !! - Potential duplicate medications found  Please discuss with provider  No discharge procedures on file      PDMP Review     None          ED Provider  Electronically Signed by           Brielle Segura MD  06/14/23 0882

## 2023-06-14 NOTE — DISCHARGE INSTRUCTIONS
CTA head and neck with and without contrast    Result Date: 6/14/2023  Impression: No acute intracranial pathology  No significant stenosis of the cervical carotid or vertebral arteries  No significant intracranial stenosis, large vessel occlusion or aneurysm

## 2023-08-11 DIAGNOSIS — K21.9 GASTROESOPHAGEAL REFLUX DISEASE, UNSPECIFIED WHETHER ESOPHAGITIS PRESENT: ICD-10-CM

## 2023-08-11 RX ORDER — FAMOTIDINE 40 MG/1
40 TABLET, FILM COATED ORAL DAILY PRN
Qty: 90 TABLET | Refills: 0 | Status: SHIPPED | OUTPATIENT
Start: 2023-08-11

## 2023-08-11 RX ORDER — OMEPRAZOLE 40 MG/1
40 CAPSULE, DELAYED RELEASE ORAL DAILY
Qty: 90 CAPSULE | Refills: 0 | Status: SHIPPED | OUTPATIENT
Start: 2023-08-11

## 2023-08-22 ENCOUNTER — HOSPITAL ENCOUNTER (EMERGENCY)
Facility: HOSPITAL | Age: 58
Discharge: HOME | End: 2023-08-22
Attending: EMERGENCY MEDICINE | Admitting: EMERGENCY MEDICINE
Payer: OTHER MISCELLANEOUS

## 2023-08-22 VITALS
HEART RATE: 75 BPM | OXYGEN SATURATION: 100 % | RESPIRATION RATE: 20 BRPM | WEIGHT: 98 LBS | SYSTOLIC BLOOD PRESSURE: 142 MMHG | BODY MASS INDEX: 20.66 KG/M2 | DIASTOLIC BLOOD PRESSURE: 83 MMHG | TEMPERATURE: 98.6 F
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
## 2023-08-22 LAB
ALT SERPL W P-5'-P-CCNC: 11 U/L (ref 7–52)
HBV SURFACE AB SER-ACNC: 31 MIU/ML
HBV SURFACE AG SER QL: NORMAL
HCV AB SER QL: NORMAL
HIV 1+2 AB+HIV1 P24 AG SERPL QL IA: NORMAL
HIV 2 AB SERPL QL IA: NORMAL
HIV1 AB SERPL QL IA: NORMAL
HIV1 P24 AG SERPL QL IA: NORMAL

## 2023-08-22 PROCEDURE — 87389 HIV-1 AG W/HIV-1&-2 AB AG IA: CPT | Performed by: EMERGENCY MEDICINE

## 2023-08-22 PROCEDURE — 87340 HEPATITIS B SURFACE AG IA: CPT | Performed by: EMERGENCY MEDICINE

## 2023-08-22 PROCEDURE — 99283 EMERGENCY DEPT VISIT LOW MDM: CPT

## 2023-08-22 PROCEDURE — 86706 HEP B SURFACE ANTIBODY: CPT | Performed by: EMERGENCY MEDICINE

## 2023-08-22 PROCEDURE — 86803 HEPATITIS C AB TEST: CPT | Performed by: EMERGENCY MEDICINE

## 2023-08-22 PROCEDURE — 99284 EMERGENCY DEPT VISIT MOD MDM: CPT | Performed by: EMERGENCY MEDICINE

## 2023-08-22 PROCEDURE — 84460 ALANINE AMINO (ALT) (SGPT): CPT | Performed by: EMERGENCY MEDICINE

## 2023-08-22 PROCEDURE — 36415 COLL VENOUS BLD VENIPUNCTURE: CPT | Performed by: EMERGENCY MEDICINE

## 2023-08-22 NOTE — ED PROVIDER NOTES
Emergency Department Employee Health Note  Pete Arnold 61 y.o. 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 .States she was transferring blood to a collection tube when a need brushed the top of syringe with blood and needle then penetrated her index finger.  Her finger did bleed after the incident, but was controlled by direct pressure  Mechanism:               Review of Systems    Historical Information     Immunizations:   Immunization History   Administered Date(s) Administered   • COVID-19 MODERNA VACC 0.5 ML IM 01/21/2021, 02/17/2021   • INFLUENZA 10/10/2022   • Influenza, recombinant, quadrivalent,injectable, preservative free 10/12/2021, 10/10/2022   • TD (adult) Preservative Free 01/05/2010       Past Medical History:   Diagnosis Date   • Anxiety    • Cervical radiculopathy    • Cervical spinal stenosis    • Depression    • GERD (gastroesophageal reflux disease)    • Hiatal hernia    • History of transfusion 1985   • Myofascial pain syndrome    • Polyarthralgia    • TIA (transient ischemic attack)    • Vertigo        Family History   Problem Relation Age of Onset   • Asthma Mother    • Hypertension Father    • Ulcers Brother    • HIV Brother    • Thyroid disease Other    • Stroke Other    • Hepatitis Sister         C and B   • No Known Problems Daughter    • No Known Problems Son    • Other Maternal Grandmother         pre-diabetic   • No Known Problems Maternal Grandfather    • No Known Problems Paternal Grandmother    • Diabetes Paternal Grandfather    • No Known Problems Maternal Aunt    • No Known Problems Maternal Aunt • No Known Problems Maternal Aunt    • No Known Problems Maternal Aunt    • No Known Problems Maternal Aunt    • No Known Problems Maternal Aunt    • No Known Problems Paternal Aunt    • No Known Problems Paternal Aunt      Past Surgical History:   Procedure Laterality Date   • CARPAL TUNNEL RELEASE     •  SECTION     • CHOLECYSTECTOMY     • COLONOSCOPY     • ECTOPIC PREGNANCY SURGERY     • HYSTERECTOMY      Total unilateral removal tube and ovary   • LAPAROSCOPY     • OOPHORECTOMY      has one ovary   • HI BLEPHAROPLASTY LOWER EYELID Bilateral 2020    Procedure: BLEPHAROPLASTY LOWER;  Surgeon: Emperatriz Zaragoza MD;  Location: [address] [place] [address] OR;  Service: Plastics   • HI EXCISION GANGLION WRIST DORSAL/VOLAR PRIMARY Left 2020    Procedure: EXCISION GANGLION CYST;  Surgeon: Emperatriz Zaragoza MD;  Location: [address] [place] [address];  Service: Plastics   • PUBOVAGINAL SLING     • REPAIR RECTOCELE     • TONSILECTOMY AND ADNOIDECTOMY     • TONSILLECTOMY     • UPPER GASTROINTESTINAL ENDOSCOPY       Social History     Tobacco Use   • Smoking status: Former     Packs/day: 0.25     Types: Cigarettes   • Smokeless tobacco: Never   • Tobacco comments:     using patch and gum   Vaping Use   • Vaping Use: Never used   Substance Use Topics   • Alcohol use: No   • Drug use: Never     E-Cigarette/Vaping   • E-Cigarette Use Never User      E-Cigarette/Vaping Substances   • Nicotine No    • THC No    • CBD No    • Flavoring No    • Other No    • Unknown No          Meds/Allergies   Prior to Admission Medications   Prescriptions Last Dose Informant Patient Reported? Taking?    buPROPion (ZYBAN) 150 MG 12 hr tablet  Self No No   Sig: Take 1 tablet (150 mg total) by mouth 2 (two) times a day   busPIRone (BUSPAR) 5 mg tablet  Self No No   Sig: Currently 1 tab daily   May bump up if needed to twice a day   famotidine (PEPCID) 40 MG tablet   No No   Sig: Take 1 tablet (40 mg total) by mouth daily as needed for heartburn   fluticasone (FLONASE) 50 mcg/act nasal spray  Self No No   Si spray into each nostril daily   hydrOXYzine HCL (ATARAX) 25 mg tablet   No No   Sig: Take 1 tablet (25 mg total) by mouth every 8 (eight) hours as needed for anxiety   ibuprofen (MOTRIN) 600 mg tablet  Self No No   Sig: Take 1 tablet (600 mg total) by mouth every 6 (six) hours as needed for mild pain or moderate pain   loratadine (CLARITIN) 10 mg tablet  Self Yes No   Sig: Take 10 mg by mouth daily   meclizine (ANTIVERT) 25 mg tablet  Self Yes No   Si tablet every 8 (eight) hours as needed    meclizine (ANTIVERT) 25 mg tablet   No No   Sig: Take 1 tablet (25 mg total) by mouth 3 (three) times a day as needed for dizziness   omeprazole (PriLOSEC) 40 MG capsule   No No   Sig: Take 1 capsule (40 mg total) by mouth daily      Facility-Administered Medications: None       Allergies   Allergen Reactions   • Gabapentin Hives   • Escitalopram Drowsiness   • Fluoxetine      Ringing in ears   • Sertraline GI Intolerance   • Varenicline GI Intolerance       PHYSICAL EXAM  Physical Exam    Vital Signs  ED Triage Vitals   Temperature Pulse Respirations Blood Pressure SpO2   23 1454 23 1451 23 1451 23 1451 23 1451   98.6 °F (37 °C) 75 20 142/83 100 %      Temp Source Heart Rate Source Patient Position - Orthostatic VS BP Location FiO2 (%)   23 1454 23 1451 23 1451 23 1451 --   Tympanic Monitor Sitting Left arm       Pain Score       --                  Vitals:    23 1451   BP: 142/83   Pulse: 75   Patient Position - Orthostatic VS: Sitting         Certification of Exposure:    (link to [facility]: [facility] ([email or web address]): find link to BBP Exposure Instructions under important links on center of the page)    The patient is stable and has a history and physical exam consistent with an Blood Exposure, Body Fluid Exposure and Sharp Injury and based on my assessment this exposure is Significant     If “NonSignificant” nothing further needs to be filled out. If "Significant" please continue with the following questions    For Significant Exposures All of the following items must be completed:     Source Patient Name: Vika Avila   Source Patient MRN: [id number]  Was the Source Patient's Provider contacted No and Patient does not have PCP per available medical records   Was "Exposure Panel - Source" ordered (including Rapid HIV, HBsAg and anti-HCV) for Source Patient: No, called lab, confirmed patient discharged and no available blood tubes for needed testing. Attempted to call patient and patient contact several times without answer    Exposure Information    Type of Body Fluid Exposure: blood    Estimated volume of fluid: small up to 5cc     Exposure area: other: finger, needle stick    Skin Integrity: lacerated at time of injury, healed on my exam    ED provider should review source patient chart for known history of HIV, Hepatitis B and Hepatitis C infection    Source patient HIV positive: Unknown, medical records reviewed and no documented history of HIV noted  Was the On-call Infectious Disease Provider contacted: No  Discussed treatment options with/for employee: Yes    Immunization History   Administered Date(s) Administered   • COVID-19 MODERNA VACC 0.5 ML IM 01/21/2021, 02/17/2021   • INFLUENZA 10/10/2022   • Influenza, recombinant, quadrivalent,injectable, preservative free 10/12/2021, 10/10/2022   • TD (adult) Preservative Free 01/05/2010       Hepatitis B Vaccine History:    Immunization History   Administered Date(s) Administered   • COVID-19 MODERNA VACC 0.5 ML IM 01/21/2021, 02/17/2021   • INFLUENZA 10/10/2022   • Influenza, recombinant, quadrivalent,injectable, preservative free 10/12/2021, 10/10/2022   • TD (adult) Preservative Free 01/05/2010       The patient has Previously been vaccinated for Hepatitis B.      HEPATITIS B IMMUNE GLOBULIN:  Not Indicated    Tetanus Vaccine History:    TDAP vaccination date: 1/5/2010    The patient has Declined Tdap today    Employee/Patient post exposure testing Has been performed. "Exposure Panel - Employee Baseline"  (includes HBsAg, HBsAb, anti-HCV, ALT, HIV) with verbal consent and pretesting counseling for the patient Has been ordered. Post-exposure Prophylaxis treatment options were discussed today: Patient declined      Visual Acuity      ED Medications  Medications - No data to display    Diagnostic Studies  Results Reviewed     Procedure Component Value Units Date/Time    HIV 1/2 AG/AB w Reflex [facility] for 2 yr old and above [[id number]]  (Normal) Collected: 08/22/23 1532    Lab Status: Final result Specimen: Blood from Arm, Right Updated: 08/22/23 2151     HIV-1 p24 Antigen Non-Reactive     HIV-1 Antibody Non-Reactive     HIV-2 Antibody Non-Reactive     HIV Ag-Ab 5th Gen Non-Reactive    Narrative: This 5th generation HIV Ag-Ab assay is a multiplex flow immunoassay intended for qualitative detection and differentiation of HIV-1 p24 antigen, HIV-1 (groups M and O) antibodies, and HIV-2 antibodies in human serum. This assay is intended as an aid in the diagnosis of infection with HIV-1 and/or HIV-2, including acute (primary) HIV-1 infection. The test is validated for adult patients, including pregnant women, and in pediatric patients as young as two years of age. The performance of this assay has not been established for neonates.       Hepatitis B surface antibody [[id number]] Collected: 08/22/23 1532    Lab Status: Final result Specimen: Blood from Arm, Right Updated: 08/22/23 2120     Hep B S Ab 31.00 mIU/mL     Hepatitis C antibody [[id number]]  (Normal) Collected: 08/22/23 1532    Lab Status: Final result Specimen: Blood from Arm, Right Updated: 08/22/23 2120     Hepatitis C Ab Non-reactive    Hepatitis B surface antigen [[id number]]  (Normal) Collected: 08/22/23 1532    Lab Status: Final result Specimen: Blood from Arm, Right Updated: 08/22/23 2120     Hepatitis B Surface Ag Non-reactive    ALT [[id number]]  (Normal) Collected: 08/22/23 1532    Lab Status: Final result Specimen: Blood from Arm, Right Updated: 08/22/23 1557     ALT 11 U/L              No orders to display              Procedures  Procedures         MDM    Disposition  Final diagnoses:   Needlestick injury accident with exposure to body fluid     Time reflects when diagnosis was documented in both MDM as applicable and the Disposition within this note     Time User Action Codes Description Comment    8/22/2023  4:06 PM Kindra Brooks Add [X89. 1XXA] Needlestick injury accident with exposure to body fluid       ED Disposition     ED Disposition   Discharge    Condition   --    Date/Time   Tue Aug 22, 2023  3:51 PM    Comment   Conrado Shah discharge to home/self care.                Follow-up Information     Follow up With Specialties Details Why [address] [facility], [address] [facility] Nurse Practitioner   [address] [place] [address]  [phone]            Discharge Medication List as of 8/22/2023  3:51 PM      CONTINUE these medications which have NOT CHANGED    Details   buPROPion (ZYBAN) 150 MG 12 hr tablet Take 1 tablet (150 mg total) by mouth 2 (two) times a day, Starting Mon 2/6/2023, Normal      busPIRone (BUSPAR) 5 mg tablet Currently 1 tab daily   May bump up if needed to twice a day, No Print      famotidine (PEPCID) 40 MG tablet Take 1 tablet (40 mg total) by mouth daily as needed for heartburn, Starting Fri 8/11/2023, Normal      fluticasone (FLONASE) 50 mcg/act nasal spray 1 spray into each nostril daily, Starting Fri 9/17/2021, Print      hydrOXYzine HCL (ATARAX) 25 mg tablet Take 1 tablet (25 mg total) by mouth every 8 (eight) hours as needed for anxiety, Starting Tue 3/28/2023, Normal      ibuprofen (MOTRIN) 600 mg tablet Take 1 tablet (600 mg total) by mouth every 6 (six) hours as needed for mild pain or moderate pain, Starting Fri 9/17/2021, Print      loratadine (CLARITIN) 10 mg tablet Take 10 mg by mouth daily, Historical Med      !! meclizine (ANTIVERT) 25 mg tablet 1 tablet every 8 (eight) hours as needed , Starting Tue 2/14/2017, Historical Med      !! meclizine (ANTIVERT) 25 mg tablet Take 1 tablet (25 mg total) by mouth 3 (three) times a day as needed for dizziness, Starting Wed 6/14/2023, Normal      omeprazole (PriLOSEC) 40 MG capsule Take 1 capsule (40 mg total) by mouth daily, Starting Fri 8/11/2023, Normal       !! - Potential duplicate medications found. Please discuss with provider. No discharge procedures on file.     PDMP Review     None            ED Provider  Electronically Signed by             Eden Rodríguez DO  08/23/23 3293

## 2023-08-23 DIAGNOSIS — Z77.21 EXPOSURE TO BLOODBORNE PATHOGEN: Primary | ICD-10-CM

## 2023-10-02 ENCOUNTER — HOSPITAL ENCOUNTER (OUTPATIENT)
Dept: MAMMOGRAPHY | Facility: CLINIC | Age: 58
Discharge: HOME | End: 2023-10-02
Payer: COMMERCIAL

## 2023-10-02 VITALS — HEIGHT: 57 IN | BODY MASS INDEX: 21.14 KG/M2 | WEIGHT: 98 LBS

## 2023-10-02 DIAGNOSIS — Z12.31 ENCOUNTER FOR SCREENING MAMMOGRAM FOR BREAST CANCER: ICD-10-CM

## 2023-10-02 PROCEDURE — 77067 SCR MAMMO BI INCL CAD: CPT

## 2023-10-02 PROCEDURE — 77063 BREAST TOMOSYNTHESIS BI: CPT

## 2023-10-03 ENCOUNTER — APPOINTMENT (OUTPATIENT)
Dept: LAB | Facility: HOSPITAL | Age: 58
End: 2023-10-03

## 2023-10-03 DIAGNOSIS — Z77.21 EXPOSURE TO BLOODBORNE PATHOGEN: ICD-10-CM

## 2023-10-03 LAB
HIV 1+2 AB+HIV1 P24 AG SERPL QL IA: NORMAL
HIV 2 AB SERPL QL IA: NORMAL
HIV1 AB SERPL QL IA: NORMAL
HIV1 P24 AG SERPL QL IA: NORMAL

## 2023-10-03 PROCEDURE — 87389 HIV-1 AG W/HIV-1&-2 AB AG IA: CPT

## 2023-10-03 PROCEDURE — 36415 COLL VENOUS BLD VENIPUNCTURE: CPT

## 2023-11-13 ENCOUNTER — OFFICE VISIT (OUTPATIENT)
Dept: FAMILY MEDICINE CLINIC | Facility: CLINIC | Age: 58
End: 2023-11-13
Payer: COMMERCIAL

## 2023-11-13 VITALS
HEART RATE: 73 BPM | SYSTOLIC BLOOD PRESSURE: 120 MMHG | RESPIRATION RATE: 18 BRPM | TEMPERATURE: 97.8 F | WEIGHT: 98.8 LBS | BODY MASS INDEX: 21.38 KG/M2 | OXYGEN SATURATION: 98 % | DIASTOLIC BLOOD PRESSURE: 84 MMHG

## 2023-11-13 DIAGNOSIS — T78.40XD ALLERGY, SUBSEQUENT ENCOUNTER: ICD-10-CM

## 2023-11-13 DIAGNOSIS — H81.10 BENIGN PAROXYSMAL POSITIONAL VERTIGO, UNSPECIFIED LATERALITY: Primary | ICD-10-CM

## 2023-11-13 DIAGNOSIS — Z23 ENCOUNTER FOR IMMUNIZATION: ICD-10-CM

## 2023-11-13 PROBLEM — T78.40XA ALLERGIES: Status: ACTIVE | Noted: 2023-11-13

## 2023-11-13 PROCEDURE — 99214 OFFICE O/P EST MOD 30 MIN: CPT

## 2023-11-13 PROCEDURE — 90686 IIV4 VACC NO PRSV 0.5 ML IM: CPT

## 2023-11-13 PROCEDURE — 90471 IMMUNIZATION ADMIN: CPT

## 2023-11-13 RX ORDER — FLUTICASONE PROPIONATE 50 MCG
1 SPRAY, SUSPENSION (ML) NASAL DAILY
Qty: 16 G | Refills: 0 | Status: SHIPPED | OUTPATIENT
Start: 2023-11-13

## 2023-11-13 RX ORDER — CETIRIZINE HYDROCHLORIDE 10 MG/1
10 TABLET ORAL DAILY
Qty: 90 TABLET | Refills: 0 | Status: SHIPPED | OUTPATIENT
Start: 2023-11-13

## 2023-11-13 NOTE — PROGRESS NOTES
Name: Bekah Donnelly      : 1965      MRN: [id number]  Encounter Provider: THOM Peterson  Encounter Date: 2023   Encounter department: [facility]    Assessment & Plan     1. Benign paroxysmal positional vertigo, unspecified laterality  Assessment & Plan:  Currently follows with neurology   Well controlled on medications   Still has frequent flares when changes positions too quickly and looking at the light for extended periods   Denies recent falls but can become off balance         2. Allergy, subsequent encounter  Assessment & Plan:  Previous medications: Flonase and loratadine   New medication: Cetirizine and flonase   Medication changed as patient feels as the loratadine is no longer effective. Orders:  -     fluticasone (FLONASE) 50 mcg/act nasal spray; 1 spray into each nostril daily  -     cetirizine (ZyrTEC) 10 mg tablet; Take 1 tablet (10 mg total) by mouth daily    3. Encounter for immunization  -     influenza vaccine, quadrivalent, 0.5 mL, preservative-free, for adult and pediatric patients 6 mos+ (AFLURIA, FLUARIX, FLULAVAL, FLUZONE)           Subjective      Follow up today on chronic conditions         Review of Systems   Constitutional:  Negative for activity change, chills, fatigue and fever. HENT:  Negative for congestion, ear pain, rhinorrhea, sore throat and trouble swallowing. Eyes:  Negative for pain and visual disturbance. Respiratory:  Negative for cough, chest tightness and shortness of breath. Cardiovascular:  Negative for chest pain, palpitations and leg swelling. Gastrointestinal:  Negative for abdominal pain, constipation, diarrhea, nausea and vomiting. Genitourinary:  Negative for difficulty urinating, dysuria, hematuria and urgency. Musculoskeletal:  Negative for arthralgias and back pain. Skin:  Negative for color change and rash. Neurological:  Negative for dizziness, seizures, syncope and headaches. Psychiatric/Behavioral:  Negative for dysphoric mood. The patient is not nervous/anxious. All other systems reviewed and are negative. Current Outpatient Medications on File Prior to Visit   Medication Sig    busPIRone (BUSPAR) 5 mg tablet Currently 1 tab daily   May bump up if needed to twice a day    famotidine (PEPCID) 40 MG tablet Take 1 tablet (40 mg total) by mouth daily as needed for heartburn    hydrOXYzine HCL (ATARAX) 25 mg tablet Take 1 tablet (25 mg total) by mouth every 8 (eight) hours as needed for anxiety    meclizine (ANTIVERT) 25 mg tablet Take 1 tablet (25 mg total) by mouth 3 (three) times a day as needed for dizziness    omeprazole (PriLOSEC) 40 MG capsule Take 1 capsule (40 mg total) by mouth daily    [DISCONTINUED] buPROPion (ZYBAN) 150 MG 12 hr tablet Take 1 tablet (150 mg total) by mouth 2 (two) times a day    [DISCONTINUED] loratadine (CLARITIN) 10 mg tablet Take 10 mg by mouth daily    [DISCONTINUED] fluticasone (FLONASE) 50 mcg/act nasal spray 1 spray into each nostril daily (Patient not taking: Reported on 11/13/2023)    [DISCONTINUED] ibuprofen (MOTRIN) 600 mg tablet Take 1 tablet (600 mg total) by mouth every 6 (six) hours as needed for mild pain or moderate pain (Patient not taking: Reported on 11/13/2023)    [DISCONTINUED] meclizine (ANTIVERT) 25 mg tablet 1 tablet every 8 (eight) hours as needed        Objective     /84 (BP Location: Left arm, Patient Position: Sitting, Cuff Size: Standard)   Pulse 73   Temp 97.8 °F (36.6 °C) (Temporal)   Resp 18   Wt 44.8 kg (98 lb 12.8 oz)   LMP  (LMP Unknown)   SpO2 98%   BMI 21.38 kg/m²     Physical Exam  Vitals and nursing note reviewed. Constitutional:       Appearance: Normal appearance. She is normal weight. HENT:      Head: Normocephalic. Right Ear: Tympanic membrane, ear canal and external ear normal. There is no impacted cerumen.       Left Ear: Tympanic membrane, ear canal and external ear normal. There is no impacted cerumen. Nose: Nose normal.      Mouth/Throat:      Mouth: Mucous membranes are moist.      Pharynx: Oropharynx is clear. Eyes:      Extraocular Movements: Extraocular movements intact. Conjunctiva/sclera: Conjunctivae normal.      Pupils: Pupils are equal, round, and reactive to light. Cardiovascular:      Rate and Rhythm: Normal rate and regular rhythm. Pulses: Normal pulses. Heart sounds: Normal heart sounds. Pulmonary:      Effort: Pulmonary effort is normal.      Breath sounds: Normal breath sounds. Abdominal:      General: Bowel sounds are normal. There is no distension. Palpations: Abdomen is soft. Tenderness: There is no abdominal tenderness. Musculoskeletal:         General: Normal range of motion. Cervical back: Normal range of motion. Skin:     General: Skin is warm. Capillary Refill: Capillary refill takes less than 2 seconds. Neurological:      General: No focal deficit present. Mental Status: She is alert and oriented to person, place, and time. Mental status is at baseline. Psychiatric:         Mood and Affect: Mood normal.         Behavior: Behavior normal.         Thought Content:  Thought content normal.         Judgment: Judgment normal.       THOM Mtz

## 2023-11-13 NOTE — ASSESSMENT & PLAN NOTE
Previous medications: Flonase and loratadine   New medication: Cetirizine and flonase   Medication changed as patient feels as the loratadine is no longer effective.

## 2023-11-14 ENCOUNTER — TELEPHONE (OUTPATIENT)
Dept: FAMILY MEDICINE CLINIC | Facility: CLINIC | Age: 58
End: 2023-11-14

## 2023-11-14 NOTE — TELEPHONE ENCOUNTER
Patient states insurance does not cover the xyrtec. But they cover the xyxal. She would like to know if you can send this one instead.  Please advise

## 2023-11-15 DIAGNOSIS — T78.40XD ALLERGY, SUBSEQUENT ENCOUNTER: Primary | ICD-10-CM

## 2023-11-15 RX ORDER — LEVOCETIRIZINE DIHYDROCHLORIDE 5 MG/1
5 TABLET, FILM COATED ORAL EVERY EVENING
Qty: 30 TABLET | Refills: 0 | Status: SHIPPED | OUTPATIENT
Start: 2023-11-15

## 2023-11-16 DIAGNOSIS — K21.9 GASTROESOPHAGEAL REFLUX DISEASE, UNSPECIFIED WHETHER ESOPHAGITIS PRESENT: ICD-10-CM

## 2023-11-16 RX ORDER — OMEPRAZOLE 40 MG/1
40 CAPSULE, DELAYED RELEASE ORAL DAILY
Qty: 90 CAPSULE | Refills: 1 | Status: SHIPPED | OUTPATIENT
Start: 2023-11-16

## 2023-11-16 RX ORDER — FAMOTIDINE 40 MG/1
TABLET, FILM COATED ORAL
Qty: 90 TABLET | Refills: 1 | Status: SHIPPED | OUTPATIENT
Start: 2023-11-16

## 2023-11-16 NOTE — TELEPHONE ENCOUNTER
Patients GI provider:  Dr. Kyle Myers    Number to return call: ([phone]     Reason for call: Pt calling requesting 90 day supply of medications. She is questioning why her RX never comes with refill. Please contact patient to make aware once sent to pharmacy and discuss refills.

## 2023-11-24 ENCOUNTER — TELEPHONE (OUTPATIENT)
Dept: FAMILY MEDICINE CLINIC | Facility: CLINIC | Age: 58
End: 2023-11-24

## 2023-11-24 ENCOUNTER — OFFICE VISIT (OUTPATIENT)
Dept: FAMILY MEDICINE CLINIC | Facility: CLINIC | Age: 58
End: 2023-11-24
Payer: COMMERCIAL

## 2023-11-24 VITALS
BODY MASS INDEX: 21.14 KG/M2 | TEMPERATURE: 97.7 F | OXYGEN SATURATION: 98 % | HEIGHT: 57 IN | HEART RATE: 89 BPM | WEIGHT: 98 LBS | DIASTOLIC BLOOD PRESSURE: 90 MMHG | SYSTOLIC BLOOD PRESSURE: 110 MMHG

## 2023-11-24 DIAGNOSIS — J06.9 UPPER RESPIRATORY TRACT INFECTION, UNSPECIFIED TYPE: Primary | ICD-10-CM

## 2023-11-24 LAB — S PYO AG THROAT QL: NEGATIVE

## 2023-11-24 PROCEDURE — 87636 SARSCOV2 & INF A&B AMP PRB: CPT

## 2023-11-24 PROCEDURE — 99213 OFFICE O/P EST LOW 20 MIN: CPT

## 2023-11-24 PROCEDURE — 87880 STREP A ASSAY W/OPTIC: CPT

## 2023-11-24 RX ORDER — AMOXICILLIN 500 MG/1
500 CAPSULE ORAL EVERY 12 HOURS SCHEDULED
Qty: 14 CAPSULE | Refills: 0 | Status: SHIPPED | OUTPATIENT
Start: 2023-11-24 | End: 2023-12-01

## 2023-11-24 NOTE — PROGRESS NOTES
Name: Salena Palma      : 1965      MRN: [id number]  Encounter Provider: THOM Multani  Encounter Date: 2023   Encounter department: [facility]    Assessment & Plan     1. Upper respiratory tract infection, unspecified type  Assessment & Plan:  Symptom onset: 23  Make sure you have a thermometer and if you feel chills or sweats check it and write it down. Take Tylenol for fevers, body aches, and headaches  Drink plenty of fluids to stay well hydrated at least 2 L per day  Hot water with lemon or honey, warm tea, Can drink Gatorade/Powerade zero, mix with water    Use over-the-counter saline nasal spray/allergy medication for congestion, runny nose, and postnasal drip  Over the counter Mucinex to help clear mucus. Delsym is a cough suppressant. Vicks to the front/back of the chest bottom of the feet with socks   Stay out of work/school until afebrile >24 hours without use of antipyretics    Orders:  -     Covid/Flu- Office Collect  -     POCT rapid strepA  -     amoxicillin (AMOXIL) 500 mg capsule; Take 1 capsule (500 mg total) by mouth every 12 (twelve) hours for 7 days           Subjective      URI   This is a new problem. The current episode started in the past 7 days. There has been no fever. Associated symptoms include chest pain, congestion, coughing, ear pain, headaches, a plugged ear sensation, sinus pain, sneezing and a sore throat. Pertinent negatives include no abdominal pain, diarrhea, dysuria, nausea, rash, rhinorrhea, swollen glands, vomiting or wheezing. She has tried acetaminophen and NSAIDs (meclizine) for the symptoms. The treatment provided no relief. Review of Systems   Constitutional:  Negative for activity change, chills, fatigue and fever. HENT:  Positive for congestion, ear pain, sinus pain, sneezing and sore throat. Negative for ear discharge, rhinorrhea and trouble swallowing.     Eyes:  Negative for pain and visual disturbance. Respiratory:  Positive for cough. Negative for chest tightness, shortness of breath and wheezing. Cardiovascular:  Positive for chest pain. Negative for palpitations and leg swelling. Gastrointestinal:  Negative for abdominal pain, constipation, diarrhea, nausea and vomiting. Genitourinary:  Negative for difficulty urinating, dysuria, hematuria and urgency. Musculoskeletal:  Negative for arthralgias and back pain. Skin:  Negative for color change and rash. Neurological:  Positive for dizziness and headaches. Negative for seizures and syncope. Psychiatric/Behavioral:  Negative for dysphoric mood. The patient is not nervous/anxious. All other systems reviewed and are negative. Current Outpatient Medications on File Prior to Visit   Medication Sig    busPIRone (BUSPAR) 5 mg tablet Currently 1 tab daily   May bump up if needed to twice a day    cetirizine (ZyrTEC) 10 mg tablet Take 1 tablet (10 mg total) by mouth daily    famotidine (PEPCID) 40 MG tablet TAKE ONE TABLET BY MOUTH DAILY AS NEEDED FOR HEARTBURN    fluticasone (FLONASE) 50 mcg/act nasal spray 1 spray into each nostril daily    hydrOXYzine HCL (ATARAX) 25 mg tablet Take 1 tablet (25 mg total) by mouth every 8 (eight) hours as needed for anxiety    levocetirizine (XYZAL) 5 MG tablet Take 1 tablet (5 mg total) by mouth every evening    meclizine (ANTIVERT) 25 mg tablet Take 1 tablet (25 mg total) by mouth 3 (three) times a day as needed for dizziness    omeprazole (PriLOSEC) 40 MG capsule Take 1 capsule (40 mg total) by mouth daily       Objective     /90 (BP Location: Left arm, Patient Position: Sitting, Cuff Size: Standard)   Pulse 89   Temp 97.7 °F (36.5 °C) (Temporal)   Ht 4' 9" (1.448 m)   Wt 44.5 kg (98 lb)   LMP  (LMP Unknown)   SpO2 98%   BMI 21.21 kg/m²     Physical Exam  Vitals and nursing note reviewed. Constitutional:       Appearance: Normal appearance. She is normal weight.    HENT:      Head: Normocephalic. Right Ear: Ear canal and external ear normal. A middle ear effusion is present. There is no impacted cerumen. Tympanic membrane is erythematous and bulging. Left Ear: Ear canal and external ear normal.  No middle ear effusion. There is no impacted cerumen. Tympanic membrane is not erythematous or bulging. Nose: Nose normal.      Mouth/Throat:      Mouth: Mucous membranes are moist.      Pharynx: Oropharynx is clear. Eyes:      Extraocular Movements: Extraocular movements intact. Conjunctiva/sclera: Conjunctivae normal.      Pupils: Pupils are equal, round, and reactive to light. Cardiovascular:      Rate and Rhythm: Normal rate and regular rhythm. Pulses: Normal pulses. Heart sounds: Normal heart sounds. Pulmonary:      Effort: Pulmonary effort is normal.      Breath sounds: Normal breath sounds. Abdominal:      General: Bowel sounds are normal. There is no distension. Palpations: Abdomen is soft. Tenderness: There is no abdominal tenderness. Musculoskeletal:         General: Normal range of motion. Cervical back: Normal range of motion. Skin:     General: Skin is warm. Capillary Refill: Capillary refill takes less than 2 seconds. Neurological:      General: No focal deficit present. Mental Status: She is alert and oriented to person, place, and time. Mental status is at baseline. Psychiatric:         Mood and Affect: Mood normal.         Behavior: Behavior normal.         Thought Content: Thought content normal.         Judgment: Judgment normal.     There are no Patient Instructions on file for this visit.     THOM Young

## 2023-11-24 NOTE — ASSESSMENT & PLAN NOTE
Symptom onset: 11/21/23  Make sure you have a thermometer and if you feel chills or sweats check it and write it down. Take Tylenol for fevers, body aches, and headaches  Drink plenty of fluids to stay well hydrated at least 2 L per day  Hot water with lemon or honey, warm tea, Can drink Gatorade/Powerade zero, mix with water    Use over-the-counter saline nasal spray/allergy medication for congestion, runny nose, and postnasal drip  Over the counter Mucinex to help clear mucus. Delsym is a cough suppressant.    Vicks to the front/back of the chest bottom of the feet with socks   Stay out of work/school until afebrile >24 hours without use of antipyretics

## 2023-11-24 NOTE — LETTER
November 24, 2023     Patient: Doug Monteiro  YOB: 1965  Date of Visit: 11/24/2023      To Whom it May Concern:    Doug Monteiro is under my professional care. Caesar Lacey was seen in my office on 11/24/2023. Caesar Lacey may return to work on 11/27/23 . If you have any questions or concerns, please don't hesitate to call.          Sincerely,          THOM Clark        CC: No Recipients

## 2023-11-25 LAB
FLUAV RNA RESP QL NAA+PROBE: NEGATIVE
FLUBV RNA RESP QL NAA+PROBE: NEGATIVE
SARS-COV-2 RNA RESP QL NAA+PROBE: NEGATIVE

## 2023-11-28 ENCOUNTER — APPOINTMENT (OUTPATIENT)
Dept: LAB | Facility: HOSPITAL | Age: 58
End: 2023-11-28

## 2023-11-28 LAB
HCV AB SER QL: NORMAL
HIV 1+2 AB+HIV1 P24 AG SERPL QL IA: NORMAL
HIV 2 AB SERPL QL IA: NORMAL
HIV1 AB SERPL QL IA: NORMAL
HIV1 P24 AG SERPL QL IA: NORMAL

## 2024-01-30 DIAGNOSIS — F41.9 ANXIETY: ICD-10-CM

## 2024-01-30 DIAGNOSIS — T78.40XD ALLERGY, SUBSEQUENT ENCOUNTER: ICD-10-CM

## 2024-01-30 RX ORDER — HYDROXYZINE HYDROCHLORIDE 25 MG/1
25 TABLET, FILM COATED ORAL EVERY 8 HOURS PRN
Qty: 20 TABLET | Refills: 0 | Status: SHIPPED | OUTPATIENT
Start: 2024-01-30

## 2024-01-30 RX ORDER — FLUTICASONE PROPIONATE 50 MCG
1 SPRAY, SUSPENSION (ML) NASAL DAILY
Qty: 16 G | Refills: 0 | Status: SHIPPED | OUTPATIENT
Start: 2024-01-30

## 2024-01-30 NOTE — TELEPHONE ENCOUNTER
Pt called for refills on medication Hydroxyzine and her Flonase nasal spray. Please review and advise

## 2024-02-07 ENCOUNTER — OFFICE VISIT (OUTPATIENT)
Dept: FAMILY MEDICINE CLINIC | Facility: CLINIC | Age: 59
End: 2024-02-07

## 2024-02-07 VITALS
OXYGEN SATURATION: 98 % | SYSTOLIC BLOOD PRESSURE: 100 MMHG | HEIGHT: 57 IN | HEART RATE: 73 BPM | BODY MASS INDEX: 24.94 KG/M2 | DIASTOLIC BLOOD PRESSURE: 80 MMHG | TEMPERATURE: 97.2 F | WEIGHT: 115.6 LBS

## 2024-02-07 DIAGNOSIS — F17.200 TOBACCO DEPENDENCE: ICD-10-CM

## 2024-02-07 DIAGNOSIS — M54.6 ACUTE LEFT-SIDED THORACIC BACK PAIN: ICD-10-CM

## 2024-02-07 DIAGNOSIS — Z00.00 ANNUAL PHYSICAL EXAM: Primary | ICD-10-CM

## 2024-02-07 DIAGNOSIS — T78.40XD ALLERGY, SUBSEQUENT ENCOUNTER: ICD-10-CM

## 2024-02-07 DIAGNOSIS — F41.9 ANXIETY: ICD-10-CM

## 2024-02-07 DIAGNOSIS — K21.9 GASTROESOPHAGEAL REFLUX DISEASE, UNSPECIFIED WHETHER ESOPHAGITIS PRESENT: ICD-10-CM

## 2024-02-07 DIAGNOSIS — Z13.1 SCREENING FOR DIABETES MELLITUS: ICD-10-CM

## 2024-02-07 DIAGNOSIS — H81.10 BENIGN PAROXYSMAL POSITIONAL VERTIGO, UNSPECIFIED LATERALITY: ICD-10-CM

## 2024-02-07 DIAGNOSIS — R73.03 PREDIABETES: ICD-10-CM

## 2024-02-07 DIAGNOSIS — Z13.6 SCREENING FOR CARDIOVASCULAR CONDITION: ICD-10-CM

## 2024-02-07 PROBLEM — J06.9 UPPER RESPIRATORY TRACT INFECTION: Status: RESOLVED | Noted: 2023-11-24 | Resolved: 2024-02-07

## 2024-02-07 RX ORDER — IBUPROFEN 600 MG/1
600 TABLET ORAL EVERY 6 HOURS PRN
Qty: 30 TABLET | Refills: 0 | Status: SHIPPED | OUTPATIENT
Start: 2024-02-07

## 2024-02-07 RX ORDER — CYCLOBENZAPRINE HCL 5 MG
5 TABLET ORAL 3 TIMES DAILY PRN
Qty: 30 TABLET | Refills: 0 | Status: SHIPPED | OUTPATIENT
Start: 2024-02-07

## 2024-02-07 RX ORDER — NICOTINE 21 MG/24HR
1 PATCH, TRANSDERMAL 24 HOURS TRANSDERMAL EVERY 24 HOURS
Qty: 28 PATCH | Refills: 0 | Status: SHIPPED | OUTPATIENT
Start: 2024-02-07

## 2024-02-07 NOTE — ASSESSMENT & PLAN NOTE
Well controlled on medications   Still has frequent flares when changes positions too quickly and looking at the light for extended periods   Denies recent falls but can become off balance

## 2024-02-07 NOTE — ASSESSMENT & PLAN NOTE
Acute pain after helping her sister move   Left thoracic back pain that has increased in intensity over the past few days   Exercises provided   Small course of prednisone and ibuprofen prescribed.

## 2024-02-07 NOTE — ASSESSMENT & PLAN NOTE
Nicotine use has significantly decreased but during increased times of stress she finds herself turning to the use of cigarettes as a coping mechanism   Had used nicotine patches in the past without effectiveness   Would like to try nicotine patches again- reordered

## 2024-02-07 NOTE — PROGRESS NOTES
ADULT ANNUAL PHYSICAL  [facility]    NAME: Bianca Tilley  AGE: 58 y.o. SEX: female  : 1965     DATE: 2024     Assessment and Plan:     Problem List Items Addressed This Visit          Digestive    Gastroesophageal reflux disease     Well controlled on Famotidine 40mg             Nervous and Auditory    Benign paroxysmal positional vertigo     Well controlled on medications   Still has frequent flares when changes positions too quickly and looking at the light for extended periods   Denies recent falls but can become off balance               Other    Acute left-sided thoracic back pain     Acute pain after helping her sister move   Left thoracic back pain that has increased in intensity over the past few days   Exercises provided   Small course of prednisone and ibuprofen prescribed.          Relevant Medications    cyclobenzaprine (FLEXERIL) 5 mg tablet    ibuprofen (MOTRIN) 600 mg tablet    Allergies     Current medications: Flonase and Cetirizine          Anxiety     Well controlled on current medications- Buspirone 5mg BID, hydroxyzine 25mg PRN          Prediabetes      Last A1C 5.8  Repeat A1C ordered   Follow diabetic diet which includes limiting complex carbohydrates (Breads, pasta, rice), plenty of fruits and vegetables and limit sugar intake          Relevant Orders    Hemoglobin A1C    Comprehensive metabolic panel    Tobacco dependence     Nicotine use has significantly decreased but during increased times of stress she finds herself turning to the use of cigarettes as a coping mechanism   Had used nicotine patches in the past without effectiveness   Would like to try nicotine patches again- reordered          Relevant Medications    nicotine (NICODERM CQ) 21 mg/24 hr TD 24 hr patch    nicotine polacrilex (NICORETTE) 4 mg gum     Other Visit Diagnoses       Annual physical exam    -  Primary    Screening for diabetes  mellitus        Relevant Orders    Hemoglobin A1C    Comprehensive metabolic panel    Screening for cardiovascular condition        Relevant Orders    Lipid panel    Hemoglobin A1C    Comprehensive metabolic panel            Immunizations and preventive care screenings were discussed with patient today. Appropriate education was printed on patient's after visit summary.    Counseling:  Alcohol/drug use: discussed moderation in alcohol intake, the recommendations for healthy alcohol use, and avoidance of illicit drug use.  Dental Health: discussed importance of regular tooth brushing, flossing, and dental visits.  Injury prevention: discussed safety/seat belts, safety helmets, smoke detectors, carbon dioxide detectors, and smoking near bedding or upholstery.  Sexual health: discussed sexually transmitted diseases, partner selection, use of condoms, avoidance of unintended pregnancy, and contraceptive alternatives.  Exercise: the importance of regular exercise/physical activity was discussed. Recommend exercise 3-5 times per week for at least 30 minutes.          Return in 1 year (on 2025).     Chief Complaint:     Chief Complaint   Patient presents with    Physical Exam    Back Pain     Middle spot, achy pain started 2 days ago       History of Present Illness:     Adult Annual Physical   Patient here for a comprehensive physical exam. The patient reports problems - Back pain .    Diet and Physical Activity  Diet/Nutrition: well balanced diet.   Exercise: no formal exercise.      Depression Screening  PHQ-2/9 Depression Screening    Little interest or pleasure in doing things: 0 - not at all  Feeling down, depressed, or hopeless: 0 - not at all  Trouble falling or staying asleep, or sleeping too much: 0 - not at all  Feeling tired or having little energy: 0 - not at all  Poor appetite or overeatin - not at all  Feeling bad about yourself - or that you are a failure or have let yourself or your family down: 0  - not at all  Trouble concentrating on things, such as reading the newspaper or watching television: 0 - not at all  Moving or speaking so slowly that other people could have noticed. Or the opposite - being so fidgety or restless that you have been moving around a lot more than usual: 0 - not at all  Thoughts that you would be better off dead, or of hurting yourself in some way: 0 - not at all       General Health  Sleep: sleeps well.   Hearing: normal - none .  Vision: goes for regular eye exams, most recent eye exam <1 year ago, and wears glasses.   Dental: regular dental visits, brushes teeth twice daily, and flosses teeth occasionally.       /GYN Health  Follows with gynecology? no   Patient is: postmenopausal    Advanced Care Planning  Do you have an advanced directive? no  Do you have a durable medical power of ? no     Review of Systems:     Review of Systems   Constitutional:  Negative for activity change, chills, fatigue and fever.   HENT:  Negative for congestion, ear pain, rhinorrhea, sore throat and trouble swallowing.    Eyes:  Negative for pain and visual disturbance.   Respiratory:  Negative for cough, chest tightness and shortness of breath.    Cardiovascular:  Negative for chest pain, palpitations and leg swelling.   Gastrointestinal:  Negative for abdominal pain, constipation, diarrhea, nausea and vomiting.   Genitourinary:  Negative for difficulty urinating, dysuria, hematuria and urgency.   Musculoskeletal:  Positive for back pain. Negative for arthralgias.   Skin:  Negative for color change and rash.   Neurological:  Negative for dizziness, seizures, syncope and headaches.   Psychiatric/Behavioral:  Negative for dysphoric mood. The patient is not nervous/anxious.    All other systems reviewed and are negative.     Past Medical History:     Past Medical History:   Diagnosis Date    Anxiety     Cervical radiculopathy     Cervical spinal stenosis     Current smoker     Depression      GERD (gastroesophageal reflux disease)     Hiatal hernia     History of transfusion     Myofascial pain syndrome     Polyarthralgia     TIA (transient ischemic attack)     Tobacco dependence     Upper respiratory tract infection     Vertigo       Past Surgical History:     Past Surgical History:   Procedure Laterality Date    CARPAL TUNNEL RELEASE       SECTION      CHOLECYSTECTOMY      COLONOSCOPY      ECTOPIC PREGNANCY SURGERY      HYSTERECTOMY  2009    Total unilateral removal tube and ovary    LAPAROSCOPY      OOPHORECTOMY      has one ovary    MI BLEPHAROPLASTY LOWER EYELID Bilateral 2020    Procedure: BLEPHAROPLASTY LOWER;  Surgeon: Brian Fisher MD;  Location:  MAIN OR;  Service: Plastics    MI EXCISION GANGLION WRIST DORSAL/VOLAR PRIMARY Left 2020    Procedure: EXCISION GANGLION CYST;  Surgeon: Brian Fisher MD;  Location:  MAIN OR;  Service: Plastics    PUBOVAGINAL SLING      REPAIR RECTOCELE      TONSILECTOMY AND ADNOIDECTOMY      TONSILLECTOMY      UPPER GASTROINTESTINAL ENDOSCOPY        Social History:     Social History     Socioeconomic History    Marital status:      Spouse name: None    Number of children: None    Years of education: None    Highest education level: None   Occupational History    None   Tobacco Use    Smoking status: Former     Current packs/day: 0.25     Types: Cigarettes    Smokeless tobacco: Never    Tobacco comments:     using patch and gum   Vaping Use    Vaping status: Never Used   Substance and Sexual Activity    Alcohol use: No    Drug use: Never    Sexual activity: Yes     Partners: Male     Comment:  x 17 years   Other Topics Concern    None   Social History Narrative    None     Social Determinants of Health     Financial Resource Strain: Not on file   Food Insecurity: Not on file   Transportation Needs: Not on file   Physical Activity: Not on file   Stress: Not on file   Social Connections: Not on file   Intimate Partner  Violence: Not on file   Housing Stability: Not on file      Family History:     Family History   Problem Relation Age of Onset    Asthma Mother     Hypertension Father     Hepatitis Sister         C and B    No Known Problems Daughter     Other Maternal Grandmother         pre-diabetic    No Known Problems Maternal Grandfather     No Known Problems Paternal Grandmother     Diabetes Paternal Grandfather     Ulcers Brother     HIV Brother     No Known Problems Son     No Known Problems Maternal Aunt     No Known Problems Maternal Aunt     No Known Problems Maternal Aunt     No Known Problems Maternal Aunt     No Known Problems Maternal Aunt     No Known Problems Maternal Aunt     No Known Problems Paternal Aunt     No Known Problems Paternal Aunt     Thyroid disease Other     Stroke Other     Breast cancer Neg Hx       Current Medications:     Current Outpatient Medications   Medication Sig Dispense Refill    busPIRone (BUSPAR) 5 mg tablet Currently 1 tab daily   May bump up if needed to twice a day 60 tablet 5    cetirizine (ZyrTEC) 10 mg tablet Take 1 tablet (10 mg total) by mouth daily 90 tablet 0    cyclobenzaprine (FLEXERIL) 5 mg tablet Take 1 tablet (5 mg total) by mouth 3 (three) times a day as needed for muscle spasms 30 tablet 0    famotidine (PEPCID) 40 MG tablet TAKE ONE TABLET BY MOUTH DAILY AS NEEDED FOR HEARTBURN 90 tablet 1    fluticasone (FLONASE) 50 mcg/act nasal spray 1 spray into each nostril daily 16 g 0    hydrOXYzine HCL (ATARAX) 25 mg tablet Take 1 tablet (25 mg total) by mouth every 8 (eight) hours as needed for anxiety 20 tablet 0    ibuprofen (MOTRIN) 600 mg tablet Take 1 tablet (600 mg total) by mouth every 6 (six) hours as needed for mild pain 30 tablet 0    levocetirizine (XYZAL) 5 MG tablet Take 1 tablet (5 mg total) by mouth every evening 30 tablet 0    meclizine (ANTIVERT) 25 mg tablet Take 1 tablet (25 mg total) by mouth 3 (three) times a day as needed for dizziness 30 tablet 0     "nicotine (NICODERM CQ) 21 mg/24 hr TD 24 hr patch Place 1 patch on the skin over 24 hours every 24 hours 28 patch 0    nicotine polacrilex (NICORETTE) 4 mg gum Chew 1 each (4 mg total) as needed for smoking cessation 100 each 0    omeprazole (PriLOSEC) 40 MG capsule Take 1 capsule (40 mg total) by mouth daily 90 capsule 1     No current facility-administered medications for this visit.      Allergies:     Allergies   Allergen Reactions    Gabapentin Hives    Escitalopram Drowsiness    Fluoxetine      Ringing in ears    Sertraline GI Intolerance    Varenicline GI Intolerance      Physical Exam:     /80 (BP Location: Left arm, Patient Position: Sitting, Cuff Size: Standard)   Pulse 73   Temp (!) 97.2 °F (36.2 °C) (Temporal)   Ht 4' 9\" (1.448 m)   Wt 52.4 kg (115 lb 9.6 oz)   LMP  (LMP Unknown)   SpO2 98%   BMI 25.02 kg/m²     Physical Exam  Vitals and nursing note reviewed.   Constitutional:       General: She is not in acute distress.     Appearance: Normal appearance. She is well-developed.   HENT:      Head: Normocephalic and atraumatic.      Right Ear: Tympanic membrane, ear canal and external ear normal. There is no impacted cerumen.      Left Ear: Tympanic membrane, ear canal and external ear normal. There is no impacted cerumen.      Nose: Nose normal.      Mouth/Throat:      Mouth: Mucous membranes are moist.   Eyes:      Conjunctiva/sclera: Conjunctivae normal.      Pupils: Pupils are equal, round, and reactive to light.   Cardiovascular:      Rate and Rhythm: Normal rate and regular rhythm.      Heart sounds: No murmur heard.  Pulmonary:      Effort: Pulmonary effort is normal. No respiratory distress.      Breath sounds: Normal breath sounds.   Abdominal:      Palpations: Abdomen is soft.      Tenderness: There is no abdominal tenderness.   Musculoskeletal:         General: Normal range of motion.      Cervical back: Neck supple.      Right lower leg: No edema.      Left lower leg: No edema. "   Skin:     General: Skin is warm and dry.      Capillary Refill: Capillary refill takes less than 2 seconds.   Neurological:      General: No focal deficit present.      Mental Status: She is alert and oriented to person, place, and time. Mental status is at baseline.   Psychiatric:         Mood and Affect: Mood normal.         Behavior: Behavior normal.        Patient Instructions   Upper Back Exercises   AMBULATORY CARE:   Upper back exercises  help heal and strengthen your back muscles and prevent another injury. Ask your healthcare provider if you need to see a physical therapist for more advanced exercises.  Seek care immediately if:   You have severe pain that prevents you from moving.      Call your doctor if:   Your pain becomes worse.    You have new pain.    You have questions or concerns about your condition, care, or exercise program.    What you need to know about exercise safety:   Do the exercises on a mat or firm surface (not on a bed).  A firm surface will support your spine and prevent upper back pain.    Move slowly and smoothly.  Avoid fast or jerky motions.    Breathe normally.  Do not hold your breath.    Stop if you feel pain.  It is normal to feel some discomfort at first, but you should not feel pain. Regular exercise will help decrease your discomfort over time.    Perform upper back exercises safely:  Ask your healthcare provider which of the following exercises are best for you and how often to do them.  Head rolls:  Sit in a chair or stand. Bring your chin toward your chest and roll your head to the right. Your ear should be over your shoulder. Hold this position for 5 seconds. Roll your head back toward your chest and to the left. Your ear should be over your left shoulder. Hold this position for 5 seconds. Next, roll your head back slowly in a clockwise [place] and repeat 3 times. Do 3 sets of head rolls.         Scapular squeeze:  Sit or stand with your arms at your sides. Squeeze  your shoulder blades together and hold for 3 seconds. Relax and repeat 3 times.         Pectoralis stretch:   a doorway. Lift your hands and place them on each side of the door frame or wall slightly higher than your head. Lean forward slowly until you feel a gentle stretch. Hold for 15 seconds. Repeat 3 times, or as directed.         Cat and camel exercise:  Place your hands and knees on the floor. Arch your back upward toward the ceiling and lower your head. Round out your spine as much as you can. Hold for 5 seconds. Lift your head upward and push your chest downward toward the floor. Hold for 5 seconds. Do 3 sets or as directed.         Bird dog:  Place your hands and knees on the floor. Keep your wrists directly below your shoulders and your knees directly below your hips. Pull your belly button in toward your spine. Do not flatten or arch your back. Tighten your abdominal muscles. Raise one arm straight out so that it is aligned with your head. Next, raise the leg opposite your arm. Hold this position for 15 seconds. Lower your arm and leg slowly and change sides. Do 5 sets.       Follow up with your doctor as directed:  Write down your questions so you remember to ask them during your visits.  © Copyright [employer] 2023 Information is for End User's use only and may not be sold, redistributed or otherwise used for commercial purposes.  The above information is an  only. It is not intended as medical advice for individual conditions or treatments. Talk to your doctor, nurse or pharmacist before following any medical regimen to see if it is safe and effective for you.  Wellness Visit for Adults   AMBULATORY CARE:   A wellness visit  is when you see your healthcare provider to get screened for health problems. Your healthcare provider will also give you advice on how to stay healthy. Write down your questions so you remember to ask them. Ask your healthcare provider how often you should  have a wellness visit.  What happens at a wellness visit:  Your healthcare provider will ask about your health, and your family history of health problems. This includes high blood pressure, heart disease, and cancer. He or she will ask if you have symptoms that concern you, if you smoke, and about your mood. You may also be asked about your intake of medicines, supplements, food, and alcohol. Any of the following may be done:  Your weight  will be checked. Your height may also be checked so your body mass index (BMI) can be calculated. Your BMI shows if you are at a healthy weight.    Your blood pressure  and heart rate will be checked. Your temperature may also be checked.    Blood and urine tests  may be done. Blood tests may be done to check your cholesterol levels. Abnormal cholesterol levels increase your risk for heart disease and stroke. You may also need a blood or urine test to check for diabetes if you are at increased risk. Urine tests may be done to look for signs of an infection or kidney disease.    A physical exam  includes checking your heartbeat and lungs with a stethoscope. Your healthcare provider may also check your skin to look for sun damage.    Screening tests  may be recommended. A screening test is done to check for diseases that may not cause symptoms. The screening tests you may need depend on your age, gender, family history, and lifestyle habits. For example, colorectal screening may be recommended if you are 50 years old or older.    Screening tests you need if you are a woman:   A Pap smear  is used to screen for cervical cancer. Pap smears are usually done every 3 to 5 years depending on your age. You may need them more often if you have had abnormal Pap smear test results in the past. Ask your healthcare provider how often you should have a Pap smear.    A mammogram  is an x-ray of your breasts to screen for breast cancer. Experts recommend mammograms every 2 years starting at age  50 years. You may need a mammogram at age 49 years or younger if you have an increased risk for breast cancer. Talk to your healthcare provider about when you should start having mammograms and how often you need them.    Vaccines you may need:   Get an influenza vaccine  every year. The influenza vaccine protects you from the flu. Several types of viruses cause the flu. The viruses change over time, so new vaccines are made each year.    Get a tetanus-diphtheria (Td) booster vaccine  every 10 years. This vaccine protects you against tetanus and diphtheria. Tetanus is a severe infection that may cause painful muscle spasms and lockjaw. Diphtheria is a severe bacterial infection that causes a thick covering in the back of your mouth and throat.    Get a human papillomavirus (HPV) vaccine  if you are female and aged 19 to 26 or male 19 to 21 and never received it. This vaccine protects you from HPV infection. HPV is the most common infection spread by sexual contact. HPV may also cause vaginal, penile, and anal cancers.    Get a pneumococcal vaccine  if you are aged 65 years or older. The pneumococcal vaccine is an injection given to protect you from pneumococcal disease. Pneumococcal disease is an infection caused by pneumococcal bacteria. The infection may cause pneumonia, meningitis, or an ear infection.    Get a shingles vaccine  if you are 60 or older, even if you have had shingles before. The shingles vaccine is an injection to protect you from the varicella-zoster virus. This is the same virus that causes chickenpox. Shingles is a painful rash that develops in people who had chickenpox or have been exposed to the virus.    How to eat healthy:  My Plate is a model for planning healthy meals. It shows the types and amounts of foods that should go on your plate. Fruits and vegetables make up about half of your plate, and grains and protein make up the other half. A serving of dairy is included on the side of your  plate. The amount of calories and serving sizes you need depends on your age, gender, weight, and height. Examples of healthy foods are listed below:  Eat a variety of vegetables  such as dark green, red, and orange vegetables. You can also include canned vegetables low in sodium (salt) and frozen vegetables without added butter or sauces.    Eat a variety of fresh fruits , canned fruit in 100% juice, frozen fruit, and dried fruit.    Include whole grains.  At least half of the grains you eat should be whole grains. Examples include whole-wheat bread, wheat pasta, brown rice, and whole-grain cereals such as oatmeal.    Eat a variety of protein foods such as seafood (fish and shellfish), lean meat, and poultry without skin (turkey and chicken). Examples of lean meats include pork leg, shoulder, or tenderloin, and beef round, sirloin, tenderloin, and extra lean ground beef. Other protein foods include eggs and egg substitutes, beans, peas, soy products, nuts, and seeds.    Choose low-fat dairy products such as skim or 1% milk or low-fat yogurt, cheese, and cottage cheese.    Limit unhealthy fats  such as butter, hard margarine, and shortening.       Exercise:  Exercise at least 30 minutes per day on most days of the week. Some examples of exercise include walking, biking, dancing, and swimming. You can also fit in more physical activity by taking the stairs instead of the elevator or parking farther away from stores. Include muscle strengthening activities 2 days each week. Regular exercise provides many health benefits. It helps you manage your weight, and decreases your risk for type 2 diabetes, heart disease, stroke, and high blood pressure. Exercise can also help improve your mood. Ask your healthcare provider about the best exercise plan for you.       General health and safety guidelines:   Do not smoke.  Nicotine and other chemicals in cigarettes and cigars can cause lung damage. Ask your healthcare provider  for information if you currently smoke and need help to quit. E-cigarettes or smokeless tobacco still contain nicotine. Talk to your healthcare provider before you use these products.    Limit alcohol.  A drink of alcohol is 12 ounces of beer, 5 ounces of wine, or 1½ ounces of liquor.    Lose weight, if needed.  Being overweight increases your risk of certain health conditions. These include heart disease, high blood pressure, type 2 diabetes, and certain types of cancer.    Protect your skin.  Do not sunbathe or use tanning beds. Use sunscreen with a SPF 15 or higher. Apply sunscreen at least 15 minutes before you go outside. Reapply sunscreen every 2 hours. Wear protective clothing, hats, and sunglasses when you are outside.    Drive safely.  Always wear your seatbelt. Make sure everyone in your car wears a seatbelt. A seatbelt can save your life if you are in an accident. Do not use your cell phone when you are driving. This could distract you and cause an accident. Pull over if you need to make a call or send a text message.    Practice safe sex.  Use latex condoms if are sexually active and have more than one partner. Your healthcare provider may recommend screening tests for sexually transmitted infections (STIs).    Wear helmets, lifejackets, and protective gear.  Always wear a helmet when you ride a bike or motorcycle, go skiing, or play sports that could cause a head injury. Wear protective equipment when you play sports. Wear a lifejacket when you are on a boat or doing water sports.    © Copyright [employer] 2023 Information is for End User's use only and may not be sold, redistributed or otherwise used for commercial purposes.  The above information is an  only. It is not intended as medical advice for individual conditions or treatments. Talk to your doctor, nurse or pharmacist before following any medical regimen to see if it is safe and effective for you.    Wellness Visit for Adults    AMBULATORY CARE:   A wellness visit  is when you see your healthcare provider to get screened for health problems. Your healthcare provider will also give you advice on how to stay healthy. Write down your questions so you remember to ask them. Ask your healthcare provider how often you should have a wellness visit.  What happens at a wellness visit:  Your healthcare provider will ask about your health, and your family history of health problems. This includes high blood pressure, heart disease, and cancer. He or she will ask if you have symptoms that concern you, if you smoke, and about your mood. You may also be asked about your intake of medicines, supplements, food, and alcohol. Any of the following may be done:  Your weight  will be checked. Your height may also be checked so your body mass index (BMI) can be calculated. Your BMI shows if you are at a healthy weight.    Your blood pressure  and heart rate will be checked. Your temperature may also be checked.    Blood and urine tests  may be done. Blood tests may be done to check your cholesterol levels. Abnormal cholesterol levels increase your risk for heart disease and stroke. You may also need a blood or urine test to check for diabetes if you are at increased risk. Urine tests may be done to look for signs of an infection or kidney disease.    A physical exam  includes checking your heartbeat and lungs with a stethoscope. Your healthcare provider may also check your skin to look for sun damage.    Screening tests  may be recommended. A screening test is done to check for diseases that may not cause symptoms. The screening tests you may need depend on your age, gender, family history, and lifestyle habits. For example, colorectal screening may be recommended if you are 50 years old or older.    Screening tests you need if you are a woman:   A Pap smear  is used to screen for cervical cancer. Pap smears are usually done every 3 to 5 years depending on your  age. You may need them more often if you have had abnormal Pap smear test results in the past. Ask your healthcare provider how often you should have a Pap smear.    A mammogram  is an x-ray of your breasts to screen for breast cancer. Experts recommend mammograms every 2 years starting at age 50 years. You may need a mammogram at age 49 years or younger if you have an increased risk for breast cancer. Talk to your healthcare provider about when you should start having mammograms and how often you need them.    Vaccines you may need:   Get an influenza vaccine  every year. The influenza vaccine protects you from the flu. Several types of viruses cause the flu. The viruses change over time, so new vaccines are made each year.    Get a tetanus-diphtheria (Td) booster vaccine  every 10 years. This vaccine protects you against tetanus and diphtheria. Tetanus is a severe infection that may cause painful muscle spasms and lockjaw. Diphtheria is a severe bacterial infection that causes a thick covering in the back of your mouth and throat.    Get a human papillomavirus (HPV) vaccine  if you are female and aged 19 to 26 or male 19 to 21 and never received it. This vaccine protects you from HPV infection. HPV is the most common infection spread by sexual contact. HPV may also cause vaginal, penile, and anal cancers.    Get a pneumococcal vaccine  if you are aged 65 years or older. The pneumococcal vaccine is an injection given to protect you from pneumococcal disease. Pneumococcal disease is an infection caused by pneumococcal bacteria. The infection may cause pneumonia, meningitis, or an ear infection.    Get a shingles vaccine  if you are 60 or older, even if you have had shingles before. The shingles vaccine is an injection to protect you from the varicella-zoster virus. This is the same virus that causes chickenpox. Shingles is a painful rash that develops in people who had chickenpox or have been exposed to the  virus.    How to eat healthy:  My Plate is a model for planning healthy meals. It shows the types and amounts of foods that should go on your plate. Fruits and vegetables make up about half of your plate, and grains and protein make up the other half. A serving of dairy is included on the side of your plate. The amount of calories and serving sizes you need depends on your age, gender, weight, and height. Examples of healthy foods are listed below:  Eat a variety of vegetables  such as dark green, red, and orange vegetables. You can also include canned vegetables low in sodium (salt) and frozen vegetables without added butter or sauces.    Eat a variety of fresh fruits , canned fruit in 100% juice, frozen fruit, and dried fruit.    Include whole grains.  At least half of the grains you eat should be whole grains. Examples include whole-wheat bread, wheat pasta, brown rice, and whole-grain cereals such as oatmeal.    Eat a variety of protein foods such as seafood (fish and shellfish), lean meat, and poultry without skin (turkey and chicken). Examples of lean meats include pork leg, shoulder, or tenderloin, and beef round, sirloin, tenderloin, and extra lean ground beef. Other protein foods include eggs and egg substitutes, beans, peas, soy products, nuts, and seeds.    Choose low-fat dairy products such as skim or 1% milk or low-fat yogurt, cheese, and cottage cheese.    Limit unhealthy fats  such as butter, hard margarine, and shortening.       Exercise:  Exercise at least 30 minutes per day on most days of the week. Some examples of exercise include walking, biking, dancing, and swimming. You can also fit in more physical activity by taking the stairs instead of the elevator or parking farther away from stores. Include muscle strengthening activities 2 days each week. Regular exercise provides many health benefits. It helps you manage your weight, and decreases your risk for type 2 diabetes, heart disease, stroke,  and high blood pressure. Exercise can also help improve your mood. Ask your healthcare provider about the best exercise plan for you.       General health and safety guidelines:   Do not smoke.  Nicotine and other chemicals in cigarettes and cigars can cause lung damage. Ask your healthcare provider for information if you currently smoke and need help to quit. E-cigarettes or smokeless tobacco still contain nicotine. Talk to your healthcare provider before you use these products.    Limit alcohol.  A drink of alcohol is 12 ounces of beer, 5 ounces of wine, or 1½ ounces of liquor.    Lose weight, if needed.  Being overweight increases your risk of certain health conditions. These include heart disease, high blood pressure, type 2 diabetes, and certain types of cancer.    Protect your skin.  Do not sunbathe or use tanning beds. Use sunscreen with a SPF 15 or higher. Apply sunscreen at least 15 minutes before you go outside. Reapply sunscreen every 2 hours. Wear protective clothing, hats, and sunglasses when you are outside.    Drive safely.  Always wear your seatbelt. Make sure everyone in your car wears a seatbelt. A seatbelt can save your life if you are in an accident. Do not use your cell phone when you are driving. This could distract you and cause an accident. Pull over if you need to make a call or send a text message.    Practice safe sex.  Use latex condoms if are sexually active and have more than one partner. Your healthcare provider may recommend screening tests for sexually transmitted infections (STIs).    Wear helmets, lifejackets, and protective gear.  Always wear a helmet when you ride a bike or motorcycle, go skiing, or play sports that could cause a head injury. Wear protective equipment when you play sports. Wear a lifejacket when you are on a boat or doing water sports.    © Copyright [employer] 2023 Information is for End User's use only and may not be sold, redistributed or otherwise used for  commercial purposes.  The above information is an  only. It is not intended as medical advice for individual conditions or treatments. Talk to your doctor, nurse or pharmacist before following any medical regimen to see if it is safe and effective for you.      THOM Peter  [facility]

## 2024-02-07 NOTE — ASSESSMENT & PLAN NOTE
2/23 Last A1C 5.8  Repeat A1C ordered   Follow diabetic diet which includes limiting complex carbohydrates (Breads, pasta, rice), plenty of fruits and vegetables and limit sugar intake

## 2024-02-07 NOTE — PATIENT INSTRUCTIONS
Upper Back Exercises   AMBULATORY CARE:   Upper back exercises  help heal and strengthen your back muscles and prevent another injury. Ask your healthcare provider if you need to see a physical therapist for more advanced exercises.  Seek care immediately if:   You have severe pain that prevents you from moving.      Call your doctor if:   Your pain becomes worse.    You have new pain.    You have questions or concerns about your condition, care, or exercise program.    What you need to know about exercise safety:   Do the exercises on a mat or firm surface (not on a bed).  A firm surface will support your spine and prevent upper back pain.    Move slowly and smoothly.  Avoid fast or jerky motions.    Breathe normally.  Do not hold your breath.    Stop if you feel pain.  It is normal to feel some discomfort at first, but you should not feel pain. Regular exercise will help decrease your discomfort over time.    Perform upper back exercises safely:  Ask your healthcare provider which of the following exercises are best for you and how often to do them.  Head rolls:  Sit in a chair or stand. Bring your chin toward your chest and roll your head to the right. Your ear should be over your shoulder. Hold this position for 5 seconds. Roll your head back toward your chest and to the left. Your ear should be over your left shoulder. Hold this position for 5 seconds. Next, roll your head back slowly in a clockwise "[employer] and repeat 3 times. Do 3 sets of head rolls.         Scapular squeeze:  Sit or stand with your arms at your sides. Squeeze your shoulder blades together and hold for 3 seconds. Relax and repeat 3 times.         Pectoralis stretch:   a doorway. Lift your hands and place them on each side of the door frame or wall slightly higher than your head. Lean forward slowly until you feel a gentle stretch. Hold for 15 seconds. Repeat 3 times, or as directed.         Cat and camel exercise:  Place your hands and  knees on the floor. Arch your back upward toward the ceiling and lower your head. Round out your spine as much as you can. Hold for 5 seconds. Lift your head upward and push your chest downward toward the floor. Hold for 5 seconds. Do 3 sets or as directed.         Bird dog:  Place your hands and knees on the floor. Keep your wrists directly below your shoulders and your knees directly below your hips. Pull your belly button in toward your spine. Do not flatten or arch your back. Tighten your abdominal muscles. Raise one arm straight out so that it is aligned with your head. Next, raise the leg opposite your arm. Hold this position for 15 seconds. Lower your arm and leg slowly and change sides. Do 5 sets.       Follow up with your doctor as directed:  Write down your questions so you remember to ask them during your visits.  © Copyright [employer] 2023 Information is for End User's use only and may not be sold, redistributed or otherwise used for commercial purposes.  The above information is an  only. It is not intended as medical advice for individual conditions or treatments. Talk to your doctor, nurse or pharmacist before following any medical regimen to see if it is safe and effective for you.  Wellness Visit for Adults   AMBULATORY CARE:   A wellness visit  is when you see your healthcare provider to get screened for health problems. Your healthcare provider will also give you advice on how to stay healthy. Write down your questions so you remember to ask them. Ask your healthcare provider how often you should have a wellness visit.  What happens at a wellness visit:  Your healthcare provider will ask about your health, and your family history of health problems. This includes high blood pressure, heart disease, and cancer. He or she will ask if you have symptoms that concern you, if you smoke, and about your mood. You may also be asked about your intake of medicines, supplements, food, and  alcohol. Any of the following may be done:  Your weight  will be checked. Your height may also be checked so your body mass index (BMI) can be calculated. Your BMI shows if you are at a healthy weight.    Your blood pressure  and heart rate will be checked. Your temperature may also be checked.    Blood and urine tests  may be done. Blood tests may be done to check your cholesterol levels. Abnormal cholesterol levels increase your risk for heart disease and stroke. You may also need a blood or urine test to check for diabetes if you are at increased risk. Urine tests may be done to look for signs of an infection or kidney disease.    A physical exam  includes checking your heartbeat and lungs with a stethoscope. Your healthcare provider may also check your skin to look for sun damage.    Screening tests  may be recommended. A screening test is done to check for diseases that may not cause symptoms. The screening tests you may need depend on your age, gender, family history, and lifestyle habits. For example, colorectal screening may be recommended if you are 50 years old or older.    Screening tests you need if you are a woman:   A Pap smear  is used to screen for cervical cancer. Pap smears are usually done every 3 to 5 years depending on your age. You may need them more often if you have had abnormal Pap smear test results in the past. Ask your healthcare provider how often you should have a Pap smear.    A mammogram  is an x-ray of your breasts to screen for breast cancer. Experts recommend mammograms every 2 years starting at age 50 years. You may need a mammogram at age 49 years or younger if you have an increased risk for breast cancer. Talk to your healthcare provider about when you should start having mammograms and how often you need them.    Vaccines you may need:   Get an influenza vaccine  every year. The influenza vaccine protects you from the flu. Several types of viruses cause the flu. The viruses  change over time, so new vaccines are made each year.    Get a tetanus-diphtheria (Td) booster vaccine  every 10 years. This vaccine protects you against tetanus and diphtheria. Tetanus is a severe infection that may cause painful muscle spasms and lockjaw. Diphtheria is a severe bacterial infection that causes a thick covering in the back of your mouth and throat.    Get a human papillomavirus (HPV) vaccine  if you are female and aged 19 to 26 or male 19 to 21 and never received it. This vaccine protects you from HPV infection. HPV is the most common infection spread by sexual contact. HPV may also cause vaginal, penile, and anal cancers.    Get a pneumococcal vaccine  if you are aged 65 years or older. The pneumococcal vaccine is an injection given to protect you from pneumococcal disease. Pneumococcal disease is an infection caused by pneumococcal bacteria. The infection may cause pneumonia, meningitis, or an ear infection.    Get a shingles vaccine  if you are 60 or older, even if you have had shingles before. The shingles vaccine is an injection to protect you from the varicella-zoster virus. This is the same virus that causes chickenpox. Shingles is a painful rash that develops in people who had chickenpox or have been exposed to the virus.    How to eat healthy:  My Plate is a model for planning healthy meals. It shows the types and amounts of foods that should go on your plate. Fruits and vegetables make up about half of your plate, and grains and protein make up the other half. A serving of dairy is included on the side of your plate. The amount of calories and serving sizes you need depends on your age, gender, weight, and height. Examples of healthy foods are listed below:  Eat a variety of vegetables  such as dark green, red, and orange vegetables. You can also include canned vegetables low in sodium (salt) and frozen vegetables without added butter or sauces.    Eat a variety of fresh fruits , canned  fruit in 100% juice, frozen fruit, and dried fruit.    Include whole grains.  At least half of the grains you eat should be whole grains. Examples include whole-wheat bread, wheat pasta, brown rice, and whole-grain cereals such as oatmeal.    Eat a variety of protein foods such as seafood (fish and shellfish), lean meat, and poultry without skin (turkey and chicken). Examples of lean meats include pork leg, shoulder, or tenderloin, and beef round, sirloin, tenderloin, and extra lean ground beef. Other protein foods include eggs and egg substitutes, beans, peas, soy products, nuts, and seeds.    Choose low-fat dairy products such as skim or 1% milk or low-fat yogurt, cheese, and cottage cheese.    Limit unhealthy fats  such as butter, hard margarine, and shortening.       Exercise:  Exercise at least 30 minutes per day on most days of the week. Some examples of exercise include walking, biking, dancing, and swimming. You can also fit in more physical activity by taking the stairs instead of the elevator or parking farther away from stores. Include muscle strengthening activities 2 days each week. Regular exercise provides many health benefits. It helps you manage your weight, and decreases your risk for type 2 diabetes, heart disease, stroke, and high blood pressure. Exercise can also help improve your mood. Ask your healthcare provider about the best exercise plan for you.       General health and safety guidelines:   Do not smoke.  Nicotine and other chemicals in cigarettes and cigars can cause lung damage. Ask your healthcare provider for information if you currently smoke and need help to quit. E-cigarettes or smokeless tobacco still contain nicotine. Talk to your healthcare provider before you use these products.    Limit alcohol.  A drink of alcohol is 12 ounces of beer, 5 ounces of wine, or 1½ ounces of liquor.    Lose weight, if needed.  Being overweight increases your risk of certain health conditions. These  include heart disease, high blood pressure, type 2 diabetes, and certain types of cancer.    Protect your skin.  Do not sunbathe or use tanning beds. Use sunscreen with a SPF 15 or higher. Apply sunscreen at least 15 minutes before you go outside. Reapply sunscreen every 2 hours. Wear protective clothing, hats, and sunglasses when you are outside.    Drive safely.  Always wear your seatbelt. Make sure everyone in your car wears a seatbelt. A seatbelt can save your life if you are in an accident. Do not use your cell phone when you are driving. This could distract you and cause an accident. Pull over if you need to make a call or send a text message.    Practice safe sex.  Use latex condoms if are sexually active and have more than one partner. Your healthcare provider may recommend screening tests for sexually transmitted infections (STIs).    Wear helmets, lifejackets, and protective gear.  Always wear a helmet when you ride a bike or motorcycle, go skiing, or play sports that could cause a head injury. Wear protective equipment when you play sports. Wear a lifejacket when you are on a boat or doing water sports.    © Copyright [employer] 2023 Information is for End User's use only and may not be sold, redistributed or otherwise used for commercial purposes.  The above information is an  only. It is not intended as medical advice for individual conditions or treatments. Talk to your doctor, nurse or pharmacist before following any medical regimen to see if it is safe and effective for you.    Wellness Visit for Adults   AMBULATORY CARE:   A wellness visit  is when you see your healthcare provider to get screened for health problems. Your healthcare provider will also give you advice on how to stay healthy. Write down your questions so you remember to ask them. Ask your healthcare provider how often you should have a wellness visit.  What happens at a wellness visit:  Your healthcare provider will ask  about your health, and your family history of health problems. This includes high blood pressure, heart disease, and cancer. He or she will ask if you have symptoms that concern you, if you smoke, and about your mood. You may also be asked about your intake of medicines, supplements, food, and alcohol. Any of the following may be done:  Your weight  will be checked. Your height may also be checked so your body mass index (BMI) can be calculated. Your BMI shows if you are at a healthy weight.    Your blood pressure  and heart rate will be checked. Your temperature may also be checked.    Blood and urine tests  may be done. Blood tests may be done to check your cholesterol levels. Abnormal cholesterol levels increase your risk for heart disease and stroke. You may also need a blood or urine test to check for diabetes if you are at increased risk. Urine tests may be done to look for signs of an infection or kidney disease.    A physical exam  includes checking your heartbeat and lungs with a stethoscope. Your healthcare provider may also check your skin to look for sun damage.    Screening tests  may be recommended. A screening test is done to check for diseases that may not cause symptoms. The screening tests you may need depend on your age, gender, family history, and lifestyle habits. For example, colorectal screening may be recommended if you are 50 years old or older.    Screening tests you need if you are a woman:   A Pap smear  is used to screen for cervical cancer. Pap smears are usually done every 3 to 5 years depending on your age. You may need them more often if you have had abnormal Pap smear test results in the past. Ask your healthcare provider how often you should have a Pap smear.    A mammogram  is an x-ray of your breasts to screen for breast cancer. Experts recommend mammograms every 2 years starting at age 50 years. You may need a mammogram at age 49 years or younger if you have an increased risk  for breast cancer. Talk to your healthcare provider about when you should start having mammograms and how often you need them.    Vaccines you may need:   Get an influenza vaccine  every year. The influenza vaccine protects you from the flu. Several types of viruses cause the flu. The viruses change over time, so new vaccines are made each year.    Get a tetanus-diphtheria (Td) booster vaccine  every 10 years. This vaccine protects you against tetanus and diphtheria. Tetanus is a severe infection that may cause painful muscle spasms and lockjaw. Diphtheria is a severe bacterial infection that causes a thick covering in the back of your mouth and throat.    Get a human papillomavirus (HPV) vaccine  if you are female and aged 19 to 26 or male 19 to 21 and never received it. This vaccine protects you from HPV infection. HPV is the most common infection spread by sexual contact. HPV may also cause vaginal, penile, and anal cancers.    Get a pneumococcal vaccine  if you are aged 65 years or older. The pneumococcal vaccine is an injection given to protect you from pneumococcal disease. Pneumococcal disease is an infection caused by pneumococcal bacteria. The infection may cause pneumonia, meningitis, or an ear infection.    Get a shingles vaccine  if you are 60 or older, even if you have had shingles before. The shingles vaccine is an injection to protect you from the varicella-zoster virus. This is the same virus that causes chickenpox. Shingles is a painful rash that develops in people who had chickenpox or have been exposed to the virus.    How to eat healthy:  My Plate is a model for planning healthy meals. It shows the types and amounts of foods that should go on your plate. Fruits and vegetables make up about half of your plate, and grains and protein make up the other half. A serving of dairy is included on the side of your plate. The amount of calories and serving sizes you need depends on your age, gender,  weight, and height. Examples of healthy foods are listed below:  Eat a variety of vegetables  such as dark green, red, and orange vegetables. You can also include canned vegetables low in sodium (salt) and frozen vegetables without added butter or sauces.    Eat a variety of fresh fruits , canned fruit in 100% juice, frozen fruit, and dried fruit.    Include whole grains.  At least half of the grains you eat should be whole grains. Examples include whole-wheat bread, wheat pasta, brown rice, and whole-grain cereals such as oatmeal.    Eat a variety of protein foods such as seafood (fish and shellfish), lean meat, and poultry without skin (turkey and chicken). Examples of lean meats include pork leg, shoulder, or tenderloin, and beef round, sirloin, tenderloin, and extra lean ground beef. Other protein foods include eggs and egg substitutes, beans, peas, soy products, nuts, and seeds.    Choose low-fat dairy products such as skim or 1% milk or low-fat yogurt, cheese, and cottage cheese.    Limit unhealthy fats  such as butter, hard margarine, and shortening.       Exercise:  Exercise at least 30 minutes per day on most days of the week. Some examples of exercise include walking, biking, dancing, and swimming. You can also fit in more physical activity by taking the stairs instead of the elevator or parking farther away from stores. Include muscle strengthening activities 2 days each week. Regular exercise provides many health benefits. It helps you manage your weight, and decreases your risk for type 2 diabetes, heart disease, stroke, and high blood pressure. Exercise can also help improve your mood. Ask your healthcare provider about the best exercise plan for you.       General health and safety guidelines:   Do not smoke.  Nicotine and other chemicals in cigarettes and cigars can cause lung damage. Ask your healthcare provider for information if you currently smoke and need help to quit. E-cigarettes or smokeless  tobacco still contain nicotine. Talk to your healthcare provider before you use these products.    Limit alcohol.  A drink of alcohol is 12 ounces of beer, 5 ounces of wine, or 1½ ounces of liquor.    Lose weight, if needed.  Being overweight increases your risk of certain health conditions. These include heart disease, high blood pressure, type 2 diabetes, and certain types of cancer.    Protect your skin.  Do not sunbathe or use tanning beds. Use sunscreen with a SPF 15 or higher. Apply sunscreen at least 15 minutes before you go outside. Reapply sunscreen every 2 hours. Wear protective clothing, hats, and sunglasses when you are outside.    Drive safely.  Always wear your seatbelt. Make sure everyone in your car wears a seatbelt. A seatbelt can save your life if you are in an accident. Do not use your cell phone when you are driving. This could distract you and cause an accident. Pull over if you need to make a call or send a text message.    Practice safe sex.  Use latex condoms if are sexually active and have more than one partner. Your healthcare provider may recommend screening tests for sexually transmitted infections (STIs).    Wear helmets, lifejackets, and protective gear.  Always wear a helmet when you ride a bike or motorcycle, go skiing, or play sports that could cause a head injury. Wear protective equipment when you play sports. Wear a lifejacket when you are on a boat or doing water sports.    © Copyright [employer] 2023 Information is for End User's use only and may not be sold, redistributed or otherwise used for commercial purposes.  The above information is an  only. It is not intended as medical advice for individual conditions or treatments. Talk to your doctor, nurse or pharmacist before following any medical regimen to see if it is safe and effective for you.

## 2024-03-01 ENCOUNTER — APPOINTMENT (OUTPATIENT)
Dept: LAB | Facility: HOSPITAL | Age: 59
End: 2024-03-01

## 2024-03-01 DIAGNOSIS — R73.03 PREDIABETES: ICD-10-CM

## 2024-03-01 DIAGNOSIS — Z13.6 SCREENING FOR CARDIOVASCULAR CONDITION: ICD-10-CM

## 2024-03-01 DIAGNOSIS — Z13.1 SCREENING FOR DIABETES MELLITUS: ICD-10-CM

## 2024-03-01 LAB
ALBUMIN SERPL BCP-MCNC: 4.3 G/DL (ref 3.5–5)
ALP SERPL-CCNC: 99 U/L (ref 34–104)
ALT SERPL W P-5'-P-CCNC: 13 U/L (ref 7–52)
ANION GAP SERPL CALCULATED.3IONS-SCNC: 8 MMOL/L
AST SERPL W P-5'-P-CCNC: 17 U/L (ref 13–39)
BILIRUB SERPL-MCNC: 0.44 MG/DL (ref 0.2–1)
BUN SERPL-MCNC: 8 MG/DL (ref 5–25)
CALCIUM SERPL-MCNC: 9.7 MG/DL (ref 8.4–10.2)
CHLORIDE SERPL-SCNC: 104 MMOL/L (ref 96–108)
CHOLEST SERPL-MCNC: 191 MG/DL
CO2 SERPL-SCNC: 28 MMOL/L (ref 21–32)
CREAT SERPL-MCNC: 0.74 MG/DL (ref 0.6–1.3)
EST. AVERAGE GLUCOSE BLD GHB EST-MCNC: 123 MG/DL
GFR SERPL CREATININE-BSD FRML MDRD: 88 ML/MIN/1.73SQ M
GLUCOSE P FAST SERPL-MCNC: 88 MG/DL (ref 65–99)
HBA1C MFR BLD: 5.9 %
HDLC SERPL-MCNC: 62 MG/DL
HIV 1+2 AB+HIV1 P24 AG SERPL QL IA: NORMAL
HIV 2 AB SERPL QL IA: NORMAL
HIV1 AB SERPL QL IA: NORMAL
HIV1 P24 AG SERPL QL IA: NORMAL
LDLC SERPL CALC-MCNC: 111 MG/DL (ref 0–100)
NONHDLC SERPL-MCNC: 129 MG/DL
POTASSIUM SERPL-SCNC: 4.3 MMOL/L (ref 3.5–5.3)
PROT SERPL-MCNC: 7 G/DL (ref 6.4–8.4)
SODIUM SERPL-SCNC: 140 MMOL/L (ref 135–147)
TRIGL SERPL-MCNC: 90 MG/DL

## 2024-03-01 PROCEDURE — 83036 HEMOGLOBIN GLYCOSYLATED A1C: CPT

## 2024-03-01 PROCEDURE — 36415 COLL VENOUS BLD VENIPUNCTURE: CPT

## 2024-03-01 PROCEDURE — 80061 LIPID PANEL: CPT

## 2024-03-01 PROCEDURE — 80053 COMPREHEN METABOLIC PANEL: CPT

## 2024-03-05 LAB — HCV AB SER QL: NORMAL

## 2024-03-06 DIAGNOSIS — T78.40XD ALLERGY, SUBSEQUENT ENCOUNTER: ICD-10-CM

## 2024-03-06 RX ORDER — FLUTICASONE PROPIONATE 50 MCG
1 SPRAY, SUSPENSION (ML) NASAL DAILY
Qty: 16 G | Refills: 5 | Status: SHIPPED | OUTPATIENT
Start: 2024-03-06

## 2024-03-15 ENCOUNTER — TELEPHONE (OUTPATIENT)
Age: 59
End: 2024-03-15

## 2024-03-15 NOTE — TELEPHONE ENCOUNTER
Pt called with concern regarding billing. She has been billed $25 for her last visit on 2/7/24. Myself and my manager both reviewed pt chart and are unsure why pt is being billed. Unable to find a statement regarding charge. Noticed balance for $25 in pt chart. Please review pt chart and contact pt with resolution.

## 2024-03-15 NOTE — TELEPHONE ENCOUNTER
Messaged the Physician billing office to inquire about balance. Will return patients call with answer.

## 2024-04-08 ENCOUNTER — ANNUAL EXAM (OUTPATIENT)
Dept: OBGYN CLINIC | Facility: CLINIC | Age: 59
End: 2024-04-08
Payer: COMMERCIAL

## 2024-04-08 VITALS
HEIGHT: 58 IN | DIASTOLIC BLOOD PRESSURE: 68 MMHG | BODY MASS INDEX: 20.2 KG/M2 | SYSTOLIC BLOOD PRESSURE: 104 MMHG | WEIGHT: 96.2 LBS

## 2024-04-08 DIAGNOSIS — R37 SEXUAL DYSFUNCTION: ICD-10-CM

## 2024-04-08 DIAGNOSIS — Z12.31 ENCOUNTER FOR SCREENING MAMMOGRAM FOR MALIGNANT NEOPLASM OF BREAST: ICD-10-CM

## 2024-04-08 DIAGNOSIS — Z01.419 ROUTINE GYNECOLOGICAL EXAMINATION: Primary | ICD-10-CM

## 2024-04-08 PROCEDURE — S0612 ANNUAL GYNECOLOGICAL EXAMINA: HCPCS | Performed by: PHYSICIAN ASSISTANT

## 2024-04-08 NOTE — PROGRESS NOTES
Assessment   58 y.o.  who is s/p hysterectomy presenting for annual exam.    Plan   Diagnoses and all orders for this visit:    Routine gynecological examination  Normal findings on routine exam.   considerations reviewed. Aware of sx to report.   Breast awareness/SBE encouraged.  Encouraged 150 min of exercise per week.  Reviewed balanced diet.   Vitamin D and Calcium supplement recommended.     Encounter for screening mammogram for malignant neoplasm of breast  -     Mammo screening bilateral w 3d & cad; Future    Sexual dysfunction  Reviewed effect of declining levels of estrogens in postmenopausal women and role in sexual dysfunction. Reviewed tx for vaginal dryness. Will trial OTC measures first and report back if sx not resolved with this. Will consider follow up with AM or menopause clinic for consult.       Pap - no longer indicated  Mammo slip given   Colonoscopy due      RTO one year for yearly exam or sooner as needed.      __________________________________________________________________    SCREENING  Last Pap: prior to hysterectomy   Last Mammo: 10/02/2023 BIRADS 2 - Benign  Last Colonoscopy: 2023 3 year recall   Last DEXA: n/a     Subjective     Bianca Tilley is a 58 y.o.  who is s/p hysterectomy presenting for annual exam.      Last seen for annual exam in . Works for [employer] in [place] -- has been there x 11 years.     Recently got . She and partner have been together for 3 years.     GYN  Denies vaginal bleeding, vaginal discharge, itching, odor, pelvic pain and vulvar/vaginal symptoms    Hysterectomy (fibroids and AUB), unilateral oophorectomy (presence of large cyst) in . Also had pubovaginal sling and rectocele repair at that time.     Sexually active: Yes - single partner -   Concerns: Dryness - using a lubricant. Difficulty reaching/having orgasm. Denies pain, bleeding  STD testing: declines     We reviewed ASCCP guidelines for Pap testing today.        OB         Complaints: denies  Denies leakage/difficulty urinating, dysuria, hematuria, and urinary frequency/urgency    BREAST  Complaints: denies  Denies: breast lump, breast tenderness, dryness, nipple discharge, pruritus, skin color change, and skin lesion(s)    Pertinent Family Hx:   Family hx of breast cancer: no  Family hx of ovarian cancer: no  Family hx of colon cancer: no    Past Medical History:   Diagnosis Date    Anxiety     Cervical radiculopathy     Cervical spinal stenosis     Current smoker     Depression     GERD (gastroesophageal reflux disease)     Hiatal hernia     History of transfusion     Myofascial pain syndrome     Polyarthralgia     TIA (transient ischemic attack)     Tobacco dependence     Upper respiratory tract infection     Vertigo        Past Surgical History:   Procedure Laterality Date    CARPAL TUNNEL RELEASE       SECTION      CHOLECYSTECTOMY      COLONOSCOPY      ECTOPIC PREGNANCY SURGERY      HYSTERECTOMY  2009    Total unilateral removal tube and ovary    LAPAROSCOPY      OOPHORECTOMY      has one ovary    MN BLEPHAROPLASTY LOWER EYELID Bilateral 2020    Procedure: BLEPHAROPLASTY LOWER;  Surgeon: Brian Fisher MD;  Location:  MAIN OR;  Service: Plastics    MN EXCISION GANGLION WRIST DORSAL/VOLAR PRIMARY Left 2020    Procedure: EXCISION GANGLION CYST;  Surgeon: Brian Fisher MD;  Location:  MAIN OR;  Service: Plastics    PUBOVAGINAL SLING      REPAIR RECTOCELE      TONSILECTOMY AND ADNOIDECTOMY      TONSILLECTOMY      UPPER GASTROINTESTINAL ENDOSCOPY           Current Outpatient Medications:     busPIRone (BUSPAR) 5 mg tablet, Currently 1 tab daily   May bump up if needed to twice a day, Disp: 60 tablet, Rfl: 5    cetirizine (ZyrTEC) 10 mg tablet, Take 1 tablet (10 mg total) by mouth daily, Disp: 90 tablet, Rfl: 0    cyclobenzaprine (FLEXERIL) 5 mg tablet, Take 1 tablet (5 mg total) by mouth 3 (three) times a day as needed for  muscle spasms, Disp: 30 tablet, Rfl: 0    famotidine (PEPCID) 40 MG tablet, TAKE ONE TABLET BY MOUTH DAILY AS NEEDED FOR HEARTBURN, Disp: 90 tablet, Rfl: 1    fluticasone (FLONASE) 50 mcg/act nasal spray, 1 spray into each nostril daily, Disp: 16 g, Rfl: 5    hydrOXYzine HCL (ATARAX) 25 mg tablet, Take 1 tablet (25 mg total) by mouth every 8 (eight) hours as needed for anxiety, Disp: 20 tablet, Rfl: 0    ibuprofen (MOTRIN) 600 mg tablet, Take 1 tablet (600 mg total) by mouth every 6 (six) hours as needed for mild pain, Disp: 30 tablet, Rfl: 0    meclizine (ANTIVERT) 25 mg tablet, Take 1 tablet (25 mg total) by mouth 3 (three) times a day as needed for dizziness, Disp: 30 tablet, Rfl: 0    nicotine (NICODERM CQ) 21 mg/24 hr TD 24 hr patch, Place 1 patch on the skin over 24 hours every 24 hours, Disp: 28 patch, Rfl: 0    nicotine polacrilex (NICORETTE) 4 mg gum, Chew 1 each (4 mg total) as needed for smoking cessation, Disp: 100 each, Rfl: 0    omeprazole (PriLOSEC) 40 MG capsule, Take 1 capsule (40 mg total) by mouth daily, Disp: 90 capsule, Rfl: 1    levocetirizine (XYZAL) 5 MG tablet, Take 1 tablet (5 mg total) by mouth every evening (Patient not taking: Reported on 4/8/2024), Disp: 30 tablet, Rfl: 0    Allergies   Allergen Reactions    Gabapentin Hives    Escitalopram Drowsiness    Fluoxetine      Ringing in ears    Sertraline GI Intolerance    Varenicline GI Intolerance       Social History     Socioeconomic History    Marital status:      Spouse name: Not on file    Number of children: Not on file    Years of education: Not on file    Highest education level: Not on file   Occupational History    Not on file   Tobacco Use    Smoking status: Some Days     Current packs/day: 0.25     Types: Cigarettes    Smokeless tobacco: Never    Tobacco comments:     using patch and gum   Vaping Use    Vaping status: Never Used   Substance and Sexual Activity    Alcohol use: No    Drug use: Never    Sexual activity: Yes  "    Partners: Male     Birth control/protection: Female Sterilization     Comment:  x 17 years   Other Topics Concern    Not on file   Social History Narrative    Not on file     Social Determinants of Health     Financial Resource Strain: Not on file   Food Insecurity: Not on file   Transportation Needs: Not on file   Physical Activity: Not on file   Stress: Not on file   Social Connections: Not on file   Intimate Partner Violence: Not on file   Housing Stability: Not on file         REVIEW OF SYSTEMS  Constitutional: Negative  Respiratory: Negative.    Cardiovascular: Negative.  Breast: Negative for breast lump, breast tenderness, nipple discharge, skin color change, and skin lesion(s)  Gastrointestinal: Negative for abdominal pain, change in bowel habits.   Genitourinary: Negative for change in urinary stream, dysuria, urinary frequency/urgency, and frequent UTIs    OBJECTIVE  Blood pressure 104/68, height 4' 9.75\" (1.467 m), weight 43.6 kg (96 lb 3.2 oz), not currently breastfeeding.    Patient appears well and is not in distress  Breasts are symmetrical without mass, tenderness, nipple discharge, skin changes or adenopathy.   Abdomen is soft and nontender without masses.   External genitals are normal without lesions or rashes.  Urethral meatus and urethra are normal  Vaginal cuff is intact without abnormal discharge or bleeding. Atrophic tissue changes consistent with menopause.  Cervix and uterus are surgically absent.  Adnexa have no overlying masses, nontender   Skin warm and dry   Alert and oriented x 3 with normal affect      "

## 2024-04-28 ENCOUNTER — HOSPITAL ENCOUNTER (EMERGENCY)
Facility: HOSPITAL | Age: 59
Discharge: HOME | End: 2024-04-28
Attending: EMERGENCY MEDICINE | Admitting: EMERGENCY MEDICINE
Payer: COMMERCIAL

## 2024-04-28 VITALS
SYSTOLIC BLOOD PRESSURE: 100 MMHG | RESPIRATION RATE: 20 BRPM | OXYGEN SATURATION: 94 % | TEMPERATURE: 98.1 F | HEART RATE: 76 BPM | DIASTOLIC BLOOD PRESSURE: 52 MMHG | BODY MASS INDEX: 20.13 KG/M2 | WEIGHT: 95.5 LBS

## 2024-04-28 DIAGNOSIS — R19.7 NAUSEA VOMITING AND DIARRHEA: Primary | ICD-10-CM

## 2024-04-28 DIAGNOSIS — R11.2 NAUSEA VOMITING AND DIARRHEA: Primary | ICD-10-CM

## 2024-04-28 LAB
ALBUMIN SERPL BCP-MCNC: 4.8 G/DL (ref 3.5–5)
ALP SERPL-CCNC: 107 U/L (ref 34–104)
ALT SERPL W P-5'-P-CCNC: 12 U/L (ref 7–52)
ANION GAP SERPL CALCULATED.3IONS-SCNC: 12 MMOL/L (ref 4–13)
AST SERPL W P-5'-P-CCNC: 20 U/L (ref 13–39)
ATRIAL RATE: 78 BPM
BASOPHILS # BLD AUTO: 0.01 THOUSANDS/ÂΜL (ref 0–0.1)
BASOPHILS NFR BLD AUTO: 0 % (ref 0–1)
BILIRUB SERPL-MCNC: 0.41 MG/DL (ref 0.2–1)
BUN SERPL-MCNC: 30 MG/DL (ref 5–25)
CALCIUM SERPL-MCNC: 10 MG/DL (ref 8.4–10.2)
CARDIAC TROPONIN I PNL SERPL HS: 3 NG/L
CHLORIDE SERPL-SCNC: 100 MMOL/L (ref 96–108)
CO2 SERPL-SCNC: 25 MMOL/L (ref 21–32)
CREAT SERPL-MCNC: 0.85 MG/DL (ref 0.6–1.3)
EOSINOPHIL # BLD AUTO: 0.05 THOUSAND/ÂΜL (ref 0–0.61)
EOSINOPHIL NFR BLD AUTO: 1 % (ref 0–6)
ERYTHROCYTE [DISTWIDTH] IN BLOOD BY AUTOMATED COUNT: 12.9 % (ref 11.6–15.1)
GFR SERPL CREATININE-BSD FRML MDRD: 75 ML/MIN/1.73SQ M
GLUCOSE SERPL-MCNC: 75 MG/DL (ref 65–140)
HCT VFR BLD AUTO: 48 % (ref 34.8–46.1)
HGB BLD-MCNC: 15.7 G/DL (ref 11.5–15.4)
IMM GRANULOCYTES # BLD AUTO: 0.01 THOUSAND/UL (ref 0–0.2)
IMM GRANULOCYTES NFR BLD AUTO: 0 % (ref 0–2)
LIPASE SERPL-CCNC: 19 U/L (ref 11–82)
LYMPHOCYTES # BLD AUTO: 1.42 THOUSANDS/ÂΜL (ref 0.6–4.47)
LYMPHOCYTES NFR BLD AUTO: 28 % (ref 14–44)
MAGNESIUM SERPL-MCNC: 1.8 MG/DL (ref 1.9–2.7)
MCH RBC QN AUTO: 29.8 PG (ref 26.8–34.3)
MCHC RBC AUTO-ENTMCNC: 32.7 G/DL (ref 31.4–37.4)
MCV RBC AUTO: 91 FL (ref 82–98)
MONOCYTES # BLD AUTO: 0.4 THOUSAND/ÂΜL (ref 0.17–1.22)
MONOCYTES NFR BLD AUTO: 8 % (ref 4–12)
NEUTROPHILS # BLD AUTO: 3.22 THOUSANDS/ÂΜL (ref 1.85–7.62)
NEUTS SEG NFR BLD AUTO: 63 % (ref 43–75)
NRBC BLD AUTO-RTO: 0 /100 WBCS
P AXIS: 72 DEGREES
PLATELET # BLD AUTO: 175 THOUSANDS/UL (ref 149–390)
PMV BLD AUTO: 9.7 FL (ref 8.9–12.7)
POTASSIUM SERPL-SCNC: 3.9 MMOL/L (ref 3.5–5.3)
PR INTERVAL: 164 MS
PROT SERPL-MCNC: 8.3 G/DL (ref 6.4–8.4)
QRS AXIS: 50 DEGREES
QRSD INTERVAL: 84 MS
QT INTERVAL: 384 MS
QTC INTERVAL: 437 MS
RBC # BLD AUTO: 5.26 MILLION/UL (ref 3.81–5.12)
SODIUM SERPL-SCNC: 137 MMOL/L (ref 135–147)
T WAVE AXIS: 52 DEGREES
VENTRICULAR RATE: 78 BPM
WBC # BLD AUTO: 5.11 THOUSAND/UL (ref 4.31–10.16)

## 2024-04-28 PROCEDURE — 96361 HYDRATE IV INFUSION ADD-ON: CPT

## 2024-04-28 PROCEDURE — 96375 TX/PRO/DX INJ NEW DRUG ADDON: CPT

## 2024-04-28 PROCEDURE — 93010 ELECTROCARDIOGRAM REPORT: CPT | Performed by: INTERNAL MEDICINE

## 2024-04-28 PROCEDURE — 36415 COLL VENOUS BLD VENIPUNCTURE: CPT

## 2024-04-28 PROCEDURE — 85025 COMPLETE CBC W/AUTO DIFF WBC: CPT

## 2024-04-28 PROCEDURE — 96374 THER/PROPH/DIAG INJ IV PUSH: CPT

## 2024-04-28 PROCEDURE — 83690 ASSAY OF LIPASE: CPT

## 2024-04-28 PROCEDURE — 80053 COMPREHEN METABOLIC PANEL: CPT

## 2024-04-28 PROCEDURE — 99285 EMERGENCY DEPT VISIT HI MDM: CPT

## 2024-04-28 PROCEDURE — 93005 ELECTROCARDIOGRAM TRACING: CPT

## 2024-04-28 PROCEDURE — 83735 ASSAY OF MAGNESIUM: CPT

## 2024-04-28 PROCEDURE — 99284 EMERGENCY DEPT VISIT MOD MDM: CPT

## 2024-04-28 PROCEDURE — 84484 ASSAY OF TROPONIN QUANT: CPT

## 2024-04-28 RX ORDER — LOPERAMIDE HYDROCHLORIDE 2 MG/1
2 CAPSULE ORAL 4 TIMES DAILY PRN
Qty: 12 CAPSULE | Refills: 0 | Status: SHIPPED | OUTPATIENT
Start: 2024-04-28

## 2024-04-28 RX ORDER — SODIUM CHLORIDE 9 MG/ML
125 INJECTION, SOLUTION INTRAVENOUS CONTINUOUS
Status: DISCONTINUED | OUTPATIENT
Start: 2024-04-28 | End: 2024-04-28 | Stop reason: HOSPADM

## 2024-04-28 RX ORDER — LOPERAMIDE HYDROCHLORIDE 2 MG/1
2 CAPSULE ORAL ONCE
Status: COMPLETED | OUTPATIENT
Start: 2024-04-28 | End: 2024-04-28

## 2024-04-28 RX ORDER — ONDANSETRON 2 MG/ML
4 INJECTION INTRAMUSCULAR; INTRAVENOUS ONCE
Status: COMPLETED | OUTPATIENT
Start: 2024-04-28 | End: 2024-04-28

## 2024-04-28 RX ORDER — LANOLIN ALCOHOL/MO/W.PET/CERES
400 CREAM (GRAM) TOPICAL ONCE
Status: COMPLETED | OUTPATIENT
Start: 2024-04-28 | End: 2024-04-28

## 2024-04-28 RX ORDER — DICYCLOMINE HCL 20 MG
20 TABLET ORAL 2 TIMES DAILY
Qty: 20 TABLET | Refills: 0 | Status: SHIPPED | OUTPATIENT
Start: 2024-04-28

## 2024-04-28 RX ORDER — KETOROLAC TROMETHAMINE 30 MG/ML
15 INJECTION, SOLUTION INTRAMUSCULAR; INTRAVENOUS ONCE
Status: COMPLETED | OUTPATIENT
Start: 2024-04-28 | End: 2024-04-28

## 2024-04-28 RX ORDER — DIPHENHYDRAMINE HYDROCHLORIDE 50 MG/ML
25 INJECTION INTRAMUSCULAR; INTRAVENOUS ONCE
Status: COMPLETED | OUTPATIENT
Start: 2024-04-28 | End: 2024-04-28

## 2024-04-28 RX ORDER — METOCLOPRAMIDE 10 MG/1
10 TABLET ORAL EVERY 8 HOURS PRN
Qty: 9 TABLET | Refills: 0 | Status: SHIPPED | OUTPATIENT
Start: 2024-04-28

## 2024-04-28 RX ORDER — METOCLOPRAMIDE HYDROCHLORIDE 5 MG/ML
10 INJECTION INTRAMUSCULAR; INTRAVENOUS ONCE
Status: COMPLETED | OUTPATIENT
Start: 2024-04-28 | End: 2024-04-28

## 2024-04-28 RX ORDER — FAMOTIDINE 10 MG/ML
20 INJECTION, SOLUTION INTRAVENOUS ONCE
Status: COMPLETED | OUTPATIENT
Start: 2024-04-28 | End: 2024-04-28

## 2024-04-28 RX ADMIN — DIPHENHYDRAMINE HYDROCHLORIDE 25 MG: 50 INJECTION, SOLUTION INTRAMUSCULAR; INTRAVENOUS at 13:02

## 2024-04-28 RX ADMIN — LOPERAMIDE HYDROCHLORIDE 2 MG: 2 CAPSULE ORAL at 12:22

## 2024-04-28 RX ADMIN — METOCLOPRAMIDE 10 MG: 5 INJECTION, SOLUTION INTRAMUSCULAR; INTRAVENOUS at 13:02

## 2024-04-28 RX ADMIN — KETOROLAC TROMETHAMINE 15 MG: 30 INJECTION, SOLUTION INTRAMUSCULAR; INTRAVENOUS at 12:21

## 2024-04-28 RX ADMIN — Medication 400 MG: at 13:51

## 2024-04-28 RX ADMIN — ONDANSETRON 4 MG: 2 INJECTION INTRAMUSCULAR; INTRAVENOUS at 12:13

## 2024-04-28 RX ADMIN — SODIUM CHLORIDE 125 ML/HR: 0.9 INJECTION, SOLUTION INTRAVENOUS at 13:02

## 2024-04-28 RX ADMIN — SODIUM CHLORIDE 1000 ML: 0.9 INJECTION, SOLUTION INTRAVENOUS at 12:13

## 2024-04-28 RX ADMIN — FAMOTIDINE 20 MG: 10 INJECTION INTRAVENOUS at 12:22

## 2024-04-28 NOTE — DISCHARGE INSTRUCTIONS
Follow-up with primary care provider.  Stay hydrated.  Return to ED for new or worsening symptoms discussed.

## 2024-04-28 NOTE — Clinical Note
Bianca Tilley was seen and treated in our emergency department on 4/28/2024.                Diagnosis:     Bianca  .    She may return on this date: 05/01/2024         If you have any questions or concerns, please don't hesitate to call.      Daisy Koch PA-C    ______________________________           _______________          _______________  Hospital Representative                              Date                                Time

## 2024-04-28 NOTE — ED PROVIDER NOTES
History  Chief Complaint   Patient presents with    Vomiting     Patient reports nausea/vomiting/diarrhea x 2 days. Complains of weakness and dizziness. Took pepto and zofran at home.      58-year-old female presents emergency department complaining of vomiting, diarrhea followed by epigastric abdominal pain x 1.5 days. Ate at a [place] restaurant night bfore symptoms started- no one ate same thing as her.  Unable to tolerate p.o.  Reports 20+ episodes of brown loose stools.  Denies hematochezia, melena, mucus/pus in stool.  Denies fevers.      History provided by:  Patient  Vomiting  Ineffective treatments:  Antiemetics  Associated symptoms: abdominal pain and chills    Associated symptoms: no cough, no diarrhea, no fever, no sore throat and no URI        Prior to Admission Medications   Prescriptions Last Dose Informant Patient Reported? Taking?   busPIRone (BUSPAR) 5 mg tablet  Self No No   Sig: Currently 1 tab daily   May bump up if needed to twice a day   cetirizine (ZyrTEC) 10 mg tablet   No No   Sig: Take 1 tablet (10 mg total) by mouth daily   cyclobenzaprine (FLEXERIL) 5 mg tablet   No No   Sig: Take 1 tablet (5 mg total) by mouth 3 (three) times a day as needed for muscle spasms   famotidine (PEPCID) 40 MG tablet   No No   Sig: TAKE ONE TABLET BY MOUTH DAILY AS NEEDED FOR HEARTBURN   fluticasone (FLONASE) 50 mcg/act nasal spray   No No   Si spray into each nostril daily   hydrOXYzine HCL (ATARAX) 25 mg tablet   No No   Sig: Take 1 tablet (25 mg total) by mouth every 8 (eight) hours as needed for anxiety   ibuprofen (MOTRIN) 600 mg tablet   No No   Sig: Take 1 tablet (600 mg total) by mouth every 6 (six) hours as needed for mild pain   levocetirizine (XYZAL) 5 MG tablet   No No   Sig: Take 1 tablet (5 mg total) by mouth every evening   Patient not taking: Reported on 2024   meclizine (ANTIVERT) 25 mg tablet   No No   Sig: Take 1 tablet (25 mg total) by mouth 3 (three) times a day as needed for  dizziness   nicotine (NICODERM CQ) 21 mg/24 hr TD 24 hr patch   No No   Sig: Place 1 patch on the skin over 24 hours every 24 hours   nicotine polacrilex (NICORETTE) 4 mg gum   No No   Sig: Chew 1 each (4 mg total) as needed for smoking cessation   omeprazole (PriLOSEC) 40 MG capsule   No No   Sig: Take 1 capsule (40 mg total) by mouth daily      Facility-Administered Medications: None       Past Medical History:   Diagnosis Date    Anxiety     Cervical radiculopathy     Cervical spinal stenosis     Current smoker     Depression     GERD (gastroesophageal reflux disease)     Hiatal hernia     History of transfusion     Myofascial pain syndrome     Polyarthralgia     TIA (transient ischemic attack)     Tobacco dependence     Upper respiratory tract infection     Vertigo        Past Surgical History:   Procedure Laterality Date    CARPAL TUNNEL RELEASE       SECTION      CHOLECYSTECTOMY      COLONOSCOPY      ECTOPIC PREGNANCY SURGERY      HYSTERECTOMY  2009    Total unilateral removal tube and ovary    LAPAROSCOPY      OOPHORECTOMY      has one ovary    NE BLEPHAROPLASTY LOWER EYELID Bilateral 2020    Procedure: BLEPHAROPLASTY LOWER;  Surgeon: Brina Fisher MD;  Location:  MAIN OR;  Service: Plastics    NE EXCISION GANGLION WRIST DORSAL/VOLAR PRIMARY Left 2020    Procedure: EXCISION GANGLION CYST;  Surgeon: Brian Fisher MD;  Location:  MAIN OR;  Service: Plastics    PUBOVAGINAL SLING      REPAIR RECTOCELE      TONSILECTOMY AND ADNOIDECTOMY      TONSILLECTOMY      UPPER GASTROINTESTINAL ENDOSCOPY         Family History   Problem Relation Age of Onset    Asthma Mother     Hypertension Father     Hepatitis Sister         C and B    No Known Problems Daughter     Other Maternal Grandmother         pre-diabetic    No Known Problems Maternal Grandfather     Diabetes Paternal Grandmother     Diabetes Paternal Grandfather     Ulcers Brother     HIV Brother     No Known Problems Son     No  Known Problems Maternal Aunt     No Known Problems Maternal Aunt     No Known Problems Maternal Aunt     No Known Problems Maternal Aunt     No Known Problems Maternal Aunt     No Known Problems Maternal Aunt     No Known Problems Paternal Aunt     No Known Problems Paternal Aunt     Thyroid disease Other     Stroke Other     Breast cancer Neg Hx      I have reviewed and agree with the history as documented.    E-Cigarette/Vaping    E-Cigarette Use Never User      E-Cigarette/Vaping Substances    Nicotine No     THC No     CBD No     Flavoring No     Other No     Unknown No      Social History     Tobacco Use    Smoking status: Some Days     Current packs/day: 0.25     Types: Cigarettes    Smokeless tobacco: Never    Tobacco comments:     using patch and gum   Vaping Use    Vaping status: Never Used   Substance Use Topics    Alcohol use: No    Drug use: Never       Review of Systems   Constitutional:  Positive for chills. Negative for diaphoresis and fever.   HENT:  Negative for sore throat.    Respiratory:  Negative for cough and shortness of breath.    Cardiovascular:  Negative for chest pain and palpitations.   Gastrointestinal:  Positive for abdominal pain, nausea and vomiting. Negative for abdominal distention, anal bleeding, blood in stool and diarrhea.   Genitourinary:  Negative for dysuria, flank pain, frequency, hematuria and urgency.   Musculoskeletal:  Negative for back pain.   Skin:  Negative for color change and rash.   Neurological:  Positive for light-headedness. Negative for syncope, weakness and numbness.   All other systems reviewed and are negative.      Physical Exam  Physical Exam  Vitals and nursing note reviewed.   Constitutional:       General: She is awake. She is not in acute distress.     Appearance: Normal appearance. She is ill-appearing. She is not toxic-appearing or diaphoretic.   HENT:      Head: Normocephalic.      Mouth/Throat:      Lips: Pink.      Mouth: Mucous membranes are  moist.   Eyes:      General: Vision grossly intact.   Cardiovascular:      Rate and Rhythm: Regular rhythm. Tachycardia present.      Heart sounds: Normal heart sounds.   Pulmonary:      Effort: Pulmonary effort is normal. No respiratory distress.      Breath sounds: Normal breath sounds.   Abdominal:      General: There is no distension.      Palpations: Abdomen is soft.      Tenderness: There is no abdominal tenderness.   Skin:     General: Skin is warm and dry.      Capillary Refill: Capillary refill takes 2 to 3 seconds.      Findings: No rash.   Neurological:      Mental Status: She is alert.         Vital Signs  ED Triage Vitals [04/28/24 1202]   Temperature Pulse Respirations Blood Pressure SpO2   98.1 °F (36.7 °C) (!) 129 20 145/89 97 %      Temp Source Heart Rate Source Patient Position - Orthostatic VS BP Location FiO2 (%)   Oral Monitor Lying Left arm --      Pain Score       9           Vitals:    04/28/24 1202 04/28/24 1254 04/28/24 1433   BP: 145/89 129/79 100/52   Pulse: (!) 129 74 76   Patient Position - Orthostatic VS: Lying Lying Lying         Visual Acuity      ED Medications  Medications   sodium chloride 0.9 % infusion (0 mL/hr Intravenous Stopped 4/28/24 1553)   ondansetron (ZOFRAN) injection 4 mg (4 mg Intravenous Given 4/28/24 1213)   sodium chloride 0.9 % bolus 1,000 mL (0 mL Intravenous Stopped 4/28/24 1255)   ketorolac (TORADOL) injection 15 mg (15 mg Intravenous Given 4/28/24 1221)   loperamide (IMODIUM) capsule 2 mg (2 mg Oral Given 4/28/24 1222)   Famotidine (PF) (PEPCID) injection 20 mg (20 mg Intravenous Given 4/28/24 1222)   metoclopramide (REGLAN) injection 10 mg (10 mg Intravenous Given 4/28/24 1302)   diphenhydrAMINE (BENADRYL) injection 25 mg (25 mg Intravenous Given 4/28/24 1302)   magnesium Oxide (MAG-OX) tablet 400 mg (400 mg Oral Given 4/28/24 1351)       Diagnostic Studies  Results Reviewed       Procedure Component Value Units Date/Time    Magnesium [[id number]]  (Abnormal)  Collected: 04/28/24 1212    Lab Status: Final result Specimen: Blood from Arm, Left Updated: 04/28/24 1331     Magnesium 1.8 mg/dL     Comprehensive metabolic panel [[id number]]  (Abnormal) Collected: 04/28/24 1212    Lab Status: Final result Specimen: Blood from Arm, Left Updated: 04/28/24 1318     Sodium 137 mmol/L      Potassium 3.9 mmol/L      Chloride 100 mmol/L      CO2 25 mmol/L      ANION GAP 12 mmol/L      BUN 30 mg/dL      Creatinine 0.85 mg/dL      Glucose 75 mg/dL      Calcium 10.0 mg/dL      AST 20 U/L      ALT 12 U/L      Alkaline Phosphatase 107 U/L      Total Protein 8.3 g/dL      Albumin 4.8 g/dL      Total Bilirubin 0.41 mg/dL      eGFR 75 ml/min/1.73sq m     Narrative:      National Kidney Disease Foundation guidelines for Chronic Kidney Disease (CKD):     Stage 1 with normal or high GFR (GFR > 90 mL/min/1.73 square meters)    Stage 2 Mild CKD (GFR = 60-89 mL/min/1.73 square meters)    Stage 3A Moderate CKD (GFR = 45-59 mL/min/1.73 square meters)    Stage 3B Moderate CKD (GFR = 30-44 mL/min/1.73 square meters)    Stage 4 Severe CKD (GFR = 15-29 mL/min/1.73 square meters)    Stage 5 End Stage CKD (GFR <15 mL/min/1.73 square meters)  Note: GFR calculation is accurate only with a steady state creatinine    Lipase [[id number]]  (Normal) Collected: 04/28/24 1212    Lab Status: Final result Specimen: Blood from Arm, Left Updated: 04/28/24 1318     Lipase 19 u/L     HS Troponin 0hr (reflex protocol) [[id number]]  (Normal) Collected: 04/28/24 1212    Lab Status: Final result Specimen: Blood from Arm, Left Updated: 04/28/24 1245     hs TnI 0hr 3 ng/L     CBC and differential [[id number]]  (Abnormal) Collected: 04/28/24 1212    Lab Status: Final result Specimen: Blood from Arm, Left Updated: 04/28/24 1222     WBC 5.11 Thousand/uL      RBC 5.26 Million/uL      Hemoglobin 15.7 g/dL      Hematocrit 48.0 %      MCV 91 fL      MCH 29.8 pg      MCHC 32.7 g/dL      RDW 12.9 %      MPV 9.7 fL      Platelets 175  Thousands/uL      nRBC 0 /100 WBCs      Segmented % 63 %      Immature Grans % 0 %      Lymphocytes % 28 %      Monocytes % 8 %      Eosinophils Relative 1 %      Basophils Relative 0 %      Absolute Neutrophils 3.22 Thousands/µL      Absolute Immature Grans 0.01 Thousand/uL      Absolute Lymphocytes 1.42 Thousands/µL      Absolute Monocytes 0.40 Thousand/µL      Eosinophils Absolute 0.05 Thousand/µL      Basophils Absolute 0.01 Thousands/µL                    No orders to display              Procedures  Procedures         ED Course  ED Course as of 04/28/24 1633   Sun Apr 28, 2024   1216 No recent antibiotics or hospitalizations. Low suspicion for c diff. Went to [facility].    1219 Procedure Note: EKG  Date/Time: 04/28/24 12:19 PM   Performed by: Daisy Koch   Authorized by: Daisy Koch  ECG interpreted by me, the ED Provider: yes   The EKG demonstrates:  Rate 78 bpm  Rhythm NSR with sinus arrhythmia   QTc 437 ms   No ST elevations/depressions     1338 Reports improvement in nausea vomiting and diarrhea.  No abdominal pain at this time.   1547 PO challenge of water and crackers was well-tolerated.              HEART Risk Score      Flowsheet Row Most Recent Value   Heart Score Risk Calculator    History 0 Filed at: 04/28/2024 1632   ECG 0 Filed at: 04/28/2024 1632   Age 1 Filed at: 04/28/2024 1632   Risk Factors 2 Filed at: 04/28/2024 1632   Troponin 0 Filed at: 04/28/2024 1632   HEART Score 3 Filed at: 04/28/2024 1632                          SBIRT 20yo+      Flowsheet Row Most Recent Value   Initial Alcohol Screen: US AUDIT-C     1. How often do you have a drink containing alcohol? 2 Filed at: 04/28/2024 1203   2. How many drinks containing alcohol do you have on a typical day you are drinking?  1 Filed at: 04/28/2024 1203   3a. Male UNDER 65: How often do you have five or more drinks on one occasion? 0 Filed at: 04/28/2024 1203   3b. FEMALE Any Age, or MALE 65+: How often do you have 4 or more drinks  "on one occassion? 0 Filed at: 04/28/2024 1203   Audit-C Score 3 Filed at: 04/28/2024 1203   DARLEEN: How many times in the past year have you...    Used an illegal drug or used a prescription medication for non-medical reasons? Never Filed at: 04/28/2024 1203                      Medical Decision Making  Ddx includes but is not limited to viral syndrome, ACS, gerd, gastritis, pancreatitis, cholecystitis, choledocholithiasis, hepatitis, bowel obstruction, ileus, gastroenteritis, UTI. Signs/symptoms of dehydration. No peritoneal signs.  No LFT elevation, lipase normal, electrolytes checked.  ECG without acute ischemic changes or dysrhythmia.  UA without infection.  Pain resolved, diarrhea improved, p.o. challenge well-tolerated.  Suspect viral gastroenteritis.  No fever, blood/mucus in stool- no overt indications for abx. Plan supportive care.    All imaging and/or lab testing discussed with patient, strict return to ED precautions discussed. Patient recommended to follow up promptly with appropriate outpatient provider. Patient and/or family members verbalizes understanding and agrees with plan. Patient and/or family members were given opportunity to ask questions, all questions were answered at this time. Patient is stable for discharge.     Portions of the record may have been created with voice recognition software. Occasional wrong word or \"sound a like\" substitutions may have occurred due to the inherent limitations of voice recognition software. Read the chart carefully and recognize, using context, where substitutions have occurred.         Amount and/or Complexity of Data Reviewed  Labs: ordered.    Risk  OTC drugs.  Prescription drug management.             Disposition  Final diagnoses:   Nausea vomiting and diarrhea     Time reflects when diagnosis was documented in both MDM as applicable and the Disposition within this note       Time User Action Codes Description Comment    4/28/2024  2:36 PM Daisy Koch " Add [R11.2,  R19.7] Nausea vomiting and diarrhea           ED Disposition       ED Disposition   Discharge    Condition   Stable    Date/Time   Sun Apr 28, 2024 [id number]    Comment   Bianca Tilley discharge to home/self care.                   Follow-up Information       Follow up With Specialties Details Why Contact Info    THOM Peter Nurse Practitioner   [address] [place] [address] [phone]              Discharge Medication List as of 4/28/2024  3:47 PM        START taking these medications    Details   dicyclomine (BENTYL) 20 mg tablet Take 1 tablet (20 mg total) by mouth 2 (two) times a day, Starting Sun 4/28/2024, Normal      loperamide (IMODIUM) 2 mg capsule Take 1 capsule (2 mg total) by mouth 4 (four) times a day as needed for diarrhea, Starting Sun 4/28/2024, Normal      metoclopramide (REGLAN) 10 mg tablet Take 1 tablet (10 mg total) by mouth every 8 (eight) hours as needed (vomiting), Starting Sun 4/28/2024, Normal           CONTINUE these medications which have NOT CHANGED    Details   busPIRone (BUSPAR) 5 mg tablet Currently 1 tab daily   May bump up if needed to twice a day, No Print      cetirizine (ZyrTEC) 10 mg tablet Take 1 tablet (10 mg total) by mouth daily, Starting Mon 11/13/2023, Normal      cyclobenzaprine (FLEXERIL) 5 mg tablet Take 1 tablet (5 mg total) by mouth 3 (three) times a day as needed for muscle spasms, Starting Wed 2/7/2024, Normal      famotidine (PEPCID) 40 MG tablet TAKE ONE TABLET BY MOUTH DAILY AS NEEDED FOR HEARTBURN, Normal      fluticasone (FLONASE) 50 mcg/act nasal spray 1 spray into each nostril daily, Starting Wed 3/6/2024, Normal      hydrOXYzine HCL (ATARAX) 25 mg tablet Take 1 tablet (25 mg total) by mouth every 8 (eight) hours as needed for anxiety, Starting Tue 1/30/2024, Normal      ibuprofen (MOTRIN) 600 mg tablet Take 1 tablet (600 mg total) by mouth every 6 (six) hours as needed for mild pain, Starting Wed 2/7/2024, Normal       levocetirizine (XYZAL) 5 MG tablet Take 1 tablet (5 mg total) by mouth every evening, Starting Wed 11/15/2023, Normal      meclizine (ANTIVERT) 25 mg tablet Take 1 tablet (25 mg total) by mouth 3 (three) times a day as needed for dizziness, Starting Wed 6/14/2023, Normal      nicotine (NICODERM CQ) 21 mg/24 hr TD 24 hr patch Place 1 patch on the skin over 24 hours every 24 hours, Starting Wed 2/7/2024, Normal      nicotine polacrilex (NICORETTE) 4 mg gum Chew 1 each (4 mg total) as needed for smoking cessation, Starting Wed 2/7/2024, Normal      omeprazole (PriLOSEC) 40 MG capsule Take 1 capsule (40 mg total) by mouth daily, Starting u 11/16/2023, Normal             No discharge procedures on file.    PDMP Review       None            ED Provider  Electronically Signed by             Daisy Koch PA-C  04/28/24 6920

## 2024-05-13 ENCOUNTER — TELEPHONE (OUTPATIENT)
Dept: FAMILY MEDICINE CLINIC | Facility: CLINIC | Age: 59
End: 2024-05-13

## 2024-07-23 DIAGNOSIS — M54.6 ACUTE LEFT-SIDED THORACIC BACK PAIN: ICD-10-CM

## 2024-07-23 DIAGNOSIS — T78.40XD ALLERGY, SUBSEQUENT ENCOUNTER: ICD-10-CM

## 2024-07-23 DIAGNOSIS — F41.9 ANXIETY: ICD-10-CM

## 2024-07-23 RX ORDER — FLUTICASONE PROPIONATE 50 MCG
1 SPRAY, SUSPENSION (ML) NASAL DAILY
Qty: 16 G | Refills: 0 | OUTPATIENT
Start: 2024-07-23

## 2024-07-24 RX ORDER — IBUPROFEN 600 MG/1
600 TABLET ORAL EVERY 6 HOURS PRN
Qty: 30 TABLET | Refills: 0 | Status: SHIPPED | OUTPATIENT
Start: 2024-07-24

## 2024-07-24 RX ORDER — HYDROXYZINE HYDROCHLORIDE 25 MG/1
25 TABLET, FILM COATED ORAL EVERY 8 HOURS PRN
Qty: 20 TABLET | Refills: 0 | Status: SHIPPED | OUTPATIENT
Start: 2024-07-24

## 2024-07-28 DIAGNOSIS — F17.200 TOBACCO DEPENDENCE: ICD-10-CM

## 2024-07-28 RX ORDER — NICOTINE 21 MG/24HR
1 PATCH, TRANSDERMAL 24 HOURS TRANSDERMAL EVERY 24 HOURS
Qty: 28 PATCH | Refills: 0 | Status: CANCELLED | OUTPATIENT
Start: 2024-07-28

## 2024-07-29 RX ORDER — NICOTINE 21 MG/24HR
1 PATCH, TRANSDERMAL 24 HOURS TRANSDERMAL EVERY 24 HOURS
Qty: 28 PATCH | Refills: 0 | Status: SHIPPED | OUTPATIENT
Start: 2024-07-29
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
## 2024-08-12 ENCOUNTER — OFFICE VISIT (OUTPATIENT)
Dept: OBGYN CLINIC | Facility: CLINIC | Age: 59
End: 2024-08-12
Payer: COMMERCIAL

## 2024-08-12 VITALS — BODY MASS INDEX: 20.24 KG/M2 | DIASTOLIC BLOOD PRESSURE: 66 MMHG | SYSTOLIC BLOOD PRESSURE: 112 MMHG | WEIGHT: 96 LBS

## 2024-08-12 DIAGNOSIS — F52.31 ANORGASMIA OF FEMALE: ICD-10-CM

## 2024-08-12 DIAGNOSIS — R68.82 LOW LIBIDO: Primary | ICD-10-CM

## 2024-08-12 PROCEDURE — 99215 OFFICE O/P EST HI 40 MIN: CPT | Performed by: NURSE PRACTITIONER

## 2024-08-12 RX ORDER — FLUCONAZOLE 200 MG/1
TABLET ORAL
COMMUNITY
Start: 2024-08-10

## 2024-08-12 RX ORDER — LORATADINE 10 MG/1
10 TABLET ORAL DAILY
COMMUNITY

## 2024-08-12 NOTE — PATIENT INSTRUCTIONS
Reviewed how libido can be multifactorial. Reviewed possible causes (medical, physical, psychosocial, relational) .   Untreated depression can lead to symptoms of sexual dysfunction.   Anxiety can also compound sexual dysfunction.  Encouraged to establish with a therapist, if desired.  Here are some suggestions. Please check with your insurance for coverage.   Rolando Major -[phone]  [facility] [phone]  Sonia Greco-[phone]  Consider establishing with a psychiatrist for medication management or follow up with your PCP.   Consider having sex before taking your medication; pay attention to timing.    Maintain adequate sleep hygiene. Keep your bedroom for sleep and sex.   Try stress relieving activities.    Acknowledge that this is a change in how you have felt in the past regarding sex.   Attempt to limit anxiety related to this change  Continue to have intimate moments with your   Change sexual script. Include use of other material to stimulate sexy thoughts  Consider sexy reading materials or movies 10 minutes per day to increase sexy thoughts  Stimulate 5 senses during sex    Set realistic expectations. You many never crave sex, which is okay. You may choose to have sex to maintain intimacy and connectedness with your .   You may have to set boundaries with your  about acceptable vs unacceptable touch.   Discuss where you can meet possibly half way with frequency of sexual experiences.   Sex does not equal love. They can be completely independent of each other. There are multiple ways to express care and love that do not equal sex.     Practice self care-exercise at least 150 minutes per week, eating well, stay hydrated and rest.  Consider trying meditation.Free apps available for your phone will talk you through the process.   Use silicone based lubricant for vaginal dryness with sex  Consider twice weekly vaginal moisturizer to hydrate the vagina    Regarding  anorgasmia, first goal is to be familiar with your body and particularly the genital area.  It good to start to identify pleasurable touch and then consider stimulating those areas.   Other tool may be considered in this process (toys, photos, videos or texts).   Once an orgasm is able to be experienced with self stimulation, consider then adding your partner.   Consider the use of sensate focus with the goal of sexual pleasure that is not orgasm focused.   CBT or cognitive behavorial therapy is often recommended  Pelvic floor PT referral  may be placed in future if needed

## 2024-08-29 DIAGNOSIS — K21.9 GASTROESOPHAGEAL REFLUX DISEASE, UNSPECIFIED WHETHER ESOPHAGITIS PRESENT: ICD-10-CM

## 2024-08-29 DIAGNOSIS — T78.40XD ALLERGY, SUBSEQUENT ENCOUNTER: Primary | ICD-10-CM

## 2024-08-29 DIAGNOSIS — T78.40XD ALLERGY, SUBSEQUENT ENCOUNTER: ICD-10-CM

## 2024-08-29 RX ORDER — FLUTICASONE PROPIONATE 50 MCG
1 SPRAY, SUSPENSION (ML) NASAL DAILY
Qty: 16 G | Refills: 5 | Status: SHIPPED | OUTPATIENT
Start: 2024-08-29

## 2024-08-30 RX ORDER — LORATADINE 10 MG/1
10 TABLET ORAL DAILY
Qty: 90 TABLET | Refills: 3 | Status: SHIPPED | OUTPATIENT
Start: 2024-08-30

## 2024-08-30 RX ORDER — OMEPRAZOLE 40 MG/1
40 CAPSULE, DELAYED RELEASE ORAL DAILY
Qty: 90 CAPSULE | Refills: 0 | Status: SHIPPED | OUTPATIENT
Start: 2024-08-30

## 2024-08-30 RX ORDER — FAMOTIDINE 40 MG/1
40 TABLET, FILM COATED ORAL DAILY
Qty: 90 TABLET | Refills: 0 | Status: SHIPPED | OUTPATIENT
Start: 2024-08-30

## 2024-09-18 DIAGNOSIS — T78.40XD ALLERGY, SUBSEQUENT ENCOUNTER: ICD-10-CM

## 2024-09-18 RX ORDER — FLUTICASONE PROPIONATE 50 MCG
1 SPRAY, SUSPENSION (ML) NASAL DAILY
Qty: 16 G | Refills: 2 | Status: SHIPPED | OUTPATIENT
Start: 2024-09-18

## 2024-09-28 ENCOUNTER — HOSPITAL ENCOUNTER (EMERGENCY)
Facility: HOSPITAL | Age: 59
Discharge: HOME | End: 2024-09-28
Attending: EMERGENCY MEDICINE
Payer: COMMERCIAL

## 2024-09-28 ENCOUNTER — APPOINTMENT (EMERGENCY)
Dept: RADIOLOGY | Facility: HOSPITAL | Age: 59
End: 2024-09-28
Payer: COMMERCIAL

## 2024-09-28 VITALS
TEMPERATURE: 97.5 F | DIASTOLIC BLOOD PRESSURE: 71 MMHG | HEART RATE: 72 BPM | SYSTOLIC BLOOD PRESSURE: 124 MMHG | OXYGEN SATURATION: 96 % | WEIGHT: 98 LBS | RESPIRATION RATE: 17 BRPM | BODY MASS INDEX: 20.66 KG/M2

## 2024-09-28 DIAGNOSIS — F41.9 ANXIETY: ICD-10-CM

## 2024-09-28 DIAGNOSIS — R00.2 PALPITATIONS: Primary | ICD-10-CM

## 2024-09-28 LAB
ALBUMIN SERPL BCG-MCNC: 4.9 G/DL (ref 3.5–5)
ALP SERPL-CCNC: 101 U/L (ref 34–104)
ALT SERPL W P-5'-P-CCNC: 8 U/L (ref 7–52)
ANION GAP SERPL CALCULATED.3IONS-SCNC: 10 MMOL/L (ref 4–13)
AST SERPL W P-5'-P-CCNC: 21 U/L (ref 13–39)
BASOPHILS # BLD AUTO: 0.04 THOUSANDS/ΜL (ref 0–0.1)
BASOPHILS NFR BLD AUTO: 1 % (ref 0–1)
BILIRUB SERPL-MCNC: 0.35 MG/DL (ref 0.2–1)
BNP SERPL-MCNC: 18 PG/ML (ref 0–100)
BUN SERPL-MCNC: 15 MG/DL (ref 5–25)
CALCIUM SERPL-MCNC: 9.7 MG/DL (ref 8.4–10.2)
CARDIAC TROPONIN I PNL SERPL HS: 3 NG/L
CHLORIDE SERPL-SCNC: 102 MMOL/L (ref 96–108)
CO2 SERPL-SCNC: 26 MMOL/L (ref 21–32)
CREAT SERPL-MCNC: 0.74 MG/DL (ref 0.6–1.3)
D DIMER PPP FEU-MCNC: 0.27 UG/ML FEU
EOSINOPHIL # BLD AUTO: 0.04 THOUSAND/ΜL (ref 0–0.61)
EOSINOPHIL NFR BLD AUTO: 1 % (ref 0–6)
ERYTHROCYTE [DISTWIDTH] IN BLOOD BY AUTOMATED COUNT: 13.2 % (ref 11.6–15.1)
GFR SERPL CREATININE-BSD FRML MDRD: 88 ML/MIN/1.73SQ M
GLUCOSE SERPL-MCNC: 104 MG/DL (ref 65–140)
HCT VFR BLD AUTO: 44.8 % (ref 34.8–46.1)
HGB BLD-MCNC: 14.1 G/DL (ref 11.5–15.4)
IMM GRANULOCYTES # BLD AUTO: 0.02 THOUSAND/UL (ref 0–0.2)
IMM GRANULOCYTES NFR BLD AUTO: 0 % (ref 0–2)
LIPASE SERPL-CCNC: 38 U/L (ref 11–82)
LYMPHOCYTES # BLD AUTO: 1.83 THOUSANDS/ΜL (ref 0.6–4.47)
LYMPHOCYTES NFR BLD AUTO: 27 % (ref 14–44)
MAGNESIUM SERPL-MCNC: 2 MG/DL (ref 1.9–2.7)
MCH RBC QN AUTO: 29.4 PG (ref 26.8–34.3)
MCHC RBC AUTO-ENTMCNC: 31.5 G/DL (ref 31.4–37.4)
MCV RBC AUTO: 93 FL (ref 82–98)
MONOCYTES # BLD AUTO: 0.35 THOUSAND/ΜL (ref 0.17–1.22)
MONOCYTES NFR BLD AUTO: 5 % (ref 4–12)
NEUTROPHILS # BLD AUTO: 4.57 THOUSANDS/ΜL (ref 1.85–7.62)
NEUTS SEG NFR BLD AUTO: 66 % (ref 43–75)
NRBC BLD AUTO-RTO: 0 /100 WBCS
PLATELET # BLD AUTO: 176 THOUSANDS/UL (ref 149–390)
PMV BLD AUTO: 9.6 FL (ref 8.9–12.7)
POTASSIUM SERPL-SCNC: 4.6 MMOL/L (ref 3.5–5.3)
PROT SERPL-MCNC: 7.7 G/DL (ref 6.4–8.4)
RBC # BLD AUTO: 4.8 MILLION/UL (ref 3.81–5.12)
SODIUM SERPL-SCNC: 138 MMOL/L (ref 135–147)
WBC # BLD AUTO: 6.85 THOUSAND/UL (ref 4.31–10.16)

## 2024-09-28 PROCEDURE — 99285 EMERGENCY DEPT VISIT HI MDM: CPT

## 2024-09-28 PROCEDURE — 83690 ASSAY OF LIPASE: CPT | Performed by: EMERGENCY MEDICINE

## 2024-09-28 PROCEDURE — 93005 ELECTROCARDIOGRAM TRACING: CPT

## 2024-09-28 PROCEDURE — 99285 EMERGENCY DEPT VISIT HI MDM: CPT | Performed by: EMERGENCY MEDICINE

## 2024-09-28 PROCEDURE — 85025 COMPLETE CBC W/AUTO DIFF WBC: CPT | Performed by: EMERGENCY MEDICINE

## 2024-09-28 PROCEDURE — 71045 X-RAY EXAM CHEST 1 VIEW: CPT

## 2024-09-28 PROCEDURE — 36415 COLL VENOUS BLD VENIPUNCTURE: CPT | Performed by: EMERGENCY MEDICINE

## 2024-09-28 PROCEDURE — 84484 ASSAY OF TROPONIN QUANT: CPT | Performed by: EMERGENCY MEDICINE

## 2024-09-28 PROCEDURE — 96360 HYDRATION IV INFUSION INIT: CPT

## 2024-09-28 PROCEDURE — 83880 ASSAY OF NATRIURETIC PEPTIDE: CPT | Performed by: EMERGENCY MEDICINE

## 2024-09-28 PROCEDURE — 85379 FIBRIN DEGRADATION QUANT: CPT | Performed by: EMERGENCY MEDICINE

## 2024-09-28 PROCEDURE — 80053 COMPREHEN METABOLIC PANEL: CPT | Performed by: EMERGENCY MEDICINE

## 2024-09-28 PROCEDURE — 83735 ASSAY OF MAGNESIUM: CPT | Performed by: EMERGENCY MEDICINE

## 2024-09-28 RX ORDER — ALPRAZOLAM 0.25 MG
0.25 TABLET ORAL 3 TIMES DAILY PRN
Qty: 10 TABLET | Refills: 0 | Status: SHIPPED | OUTPATIENT
Start: 2024-09-28 | End: 2024-10-08

## 2024-09-28 RX ADMIN — SODIUM CHLORIDE 1000 ML: 0.9 INJECTION, SOLUTION INTRAVENOUS at 15:23

## 2024-09-28 NOTE — Clinical Note
Bianca Tilley was seen and treated in our emergency department on 9/28/2024.                Diagnosis:     Bianca  may return to work on return date.    She may return on this date: 09/29/2024         If you have any questions or concerns, please don't hesitate to call.      Chanda Conley MD    ______________________________           _______________          _______________  Hospital Representative                              Date                                Time

## 2024-09-29 LAB
ATRIAL RATE: 65 BPM
P AXIS: 61 DEGREES
PR INTERVAL: 172 MS
QRS AXIS: 54 DEGREES
QRSD INTERVAL: 72 MS
QT INTERVAL: 394 MS
QTC INTERVAL: 409 MS
T WAVE AXIS: 50 DEGREES
VENTRICULAR RATE: 65 BPM

## 2024-09-29 PROCEDURE — 93010 ELECTROCARDIOGRAM REPORT: CPT

## 2024-10-12 ENCOUNTER — APPOINTMENT (EMERGENCY)
Dept: CT IMAGING | Facility: HOSPITAL | Age: 59
End: 2024-10-12
Payer: COMMERCIAL

## 2024-10-12 ENCOUNTER — HOSPITAL ENCOUNTER (EMERGENCY)
Facility: HOSPITAL | Age: 59
Discharge: HOME | End: 2024-10-12
Attending: EMERGENCY MEDICINE
Payer: COMMERCIAL

## 2024-10-12 VITALS
BODY MASS INDEX: 20.45 KG/M2 | SYSTOLIC BLOOD PRESSURE: 141 MMHG | WEIGHT: 97 LBS | OXYGEN SATURATION: 98 % | RESPIRATION RATE: 16 BRPM | TEMPERATURE: 98.3 F | HEART RATE: 95 BPM | DIASTOLIC BLOOD PRESSURE: 98 MMHG

## 2024-10-12 DIAGNOSIS — K64.9 HEMORRHOID: ICD-10-CM

## 2024-10-12 DIAGNOSIS — R11.2 NAUSEA AND VOMITING: Primary | ICD-10-CM

## 2024-10-12 DIAGNOSIS — R10.13 EPIGASTRIC ABDOMINAL PAIN: ICD-10-CM

## 2024-10-12 LAB
2HR DELTA HS TROPONIN: -1 NG/L
ALBUMIN SERPL BCG-MCNC: 4.5 G/DL (ref 3.5–5)
ALP SERPL-CCNC: 93 U/L (ref 34–104)
ALT SERPL W P-5'-P-CCNC: 10 U/L (ref 7–52)
ANION GAP SERPL CALCULATED.3IONS-SCNC: 9 MMOL/L (ref 4–13)
AST SERPL W P-5'-P-CCNC: 16 U/L (ref 13–39)
BASOPHILS # BLD AUTO: 0.02 THOUSANDS/ΜL (ref 0–0.1)
BASOPHILS NFR BLD AUTO: 0 % (ref 0–1)
BILIRUB SERPL-MCNC: 0.33 MG/DL (ref 0.2–1)
BUN SERPL-MCNC: 14 MG/DL (ref 5–25)
CALCIUM SERPL-MCNC: 9.6 MG/DL (ref 8.4–10.2)
CARDIAC TROPONIN I PNL SERPL HS: 4 NG/L
CARDIAC TROPONIN I PNL SERPL HS: 5 NG/L
CHLORIDE SERPL-SCNC: 104 MMOL/L (ref 96–108)
CO2 SERPL-SCNC: 26 MMOL/L (ref 21–32)
CREAT SERPL-MCNC: 0.66 MG/DL (ref 0.6–1.3)
EOSINOPHIL # BLD AUTO: 0.03 THOUSAND/ΜL (ref 0–0.61)
EOSINOPHIL NFR BLD AUTO: 1 % (ref 0–6)
ERYTHROCYTE [DISTWIDTH] IN BLOOD BY AUTOMATED COUNT: 13.2 % (ref 11.6–15.1)
GFR SERPL CREATININE-BSD FRML MDRD: 97 ML/MIN/1.73SQ M
GLUCOSE SERPL-MCNC: 83 MG/DL (ref 65–140)
HCT VFR BLD AUTO: 44 % (ref 34.8–46.1)
HGB BLD-MCNC: 13.8 G/DL (ref 11.5–15.4)
IMM GRANULOCYTES # BLD AUTO: 0.01 THOUSAND/UL (ref 0–0.2)
IMM GRANULOCYTES NFR BLD AUTO: 0 % (ref 0–2)
LIPASE SERPL-CCNC: 23 U/L (ref 11–82)
LYMPHOCYTES # BLD AUTO: 2.03 THOUSANDS/ΜL (ref 0.6–4.47)
LYMPHOCYTES NFR BLD AUTO: 32 % (ref 14–44)
MCH RBC QN AUTO: 28.9 PG (ref 26.8–34.3)
MCHC RBC AUTO-ENTMCNC: 31.4 G/DL (ref 31.4–37.4)
MCV RBC AUTO: 92 FL (ref 82–98)
MONOCYTES # BLD AUTO: 0.42 THOUSAND/ΜL (ref 0.17–1.22)
MONOCYTES NFR BLD AUTO: 7 % (ref 4–12)
NEUTROPHILS # BLD AUTO: 3.75 THOUSANDS/ΜL (ref 1.85–7.62)
NEUTS SEG NFR BLD AUTO: 60 % (ref 43–75)
NRBC BLD AUTO-RTO: 0 /100 WBCS
PLATELET # BLD AUTO: 204 THOUSANDS/UL (ref 149–390)
PMV BLD AUTO: 9.5 FL (ref 8.9–12.7)
POTASSIUM SERPL-SCNC: 3.9 MMOL/L (ref 3.5–5.3)
PROT SERPL-MCNC: 7.4 G/DL (ref 6.4–8.4)
RBC # BLD AUTO: 4.78 MILLION/UL (ref 3.81–5.12)
SODIUM SERPL-SCNC: 139 MMOL/L (ref 135–147)
WBC # BLD AUTO: 6.26 THOUSAND/UL (ref 4.31–10.16)

## 2024-10-12 PROCEDURE — 96374 THER/PROPH/DIAG INJ IV PUSH: CPT

## 2024-10-12 PROCEDURE — 36415 COLL VENOUS BLD VENIPUNCTURE: CPT | Performed by: PHYSICIAN ASSISTANT

## 2024-10-12 PROCEDURE — 85025 COMPLETE CBC W/AUTO DIFF WBC: CPT | Performed by: PHYSICIAN ASSISTANT

## 2024-10-12 PROCEDURE — 93005 ELECTROCARDIOGRAM TRACING: CPT

## 2024-10-12 PROCEDURE — 99284 EMERGENCY DEPT VISIT MOD MDM: CPT

## 2024-10-12 PROCEDURE — 96361 HYDRATE IV INFUSION ADD-ON: CPT

## 2024-10-12 PROCEDURE — 80053 COMPREHEN METABOLIC PANEL: CPT | Performed by: PHYSICIAN ASSISTANT

## 2024-10-12 PROCEDURE — 74177 CT ABD & PELVIS W/CONTRAST: CPT

## 2024-10-12 PROCEDURE — 83690 ASSAY OF LIPASE: CPT | Performed by: PHYSICIAN ASSISTANT

## 2024-10-12 PROCEDURE — 84484 ASSAY OF TROPONIN QUANT: CPT | Performed by: PHYSICIAN ASSISTANT

## 2024-10-12 PROCEDURE — 99285 EMERGENCY DEPT VISIT HI MDM: CPT | Performed by: PHYSICIAN ASSISTANT

## 2024-10-12 RX ORDER — ONDANSETRON 2 MG/ML
4 INJECTION INTRAMUSCULAR; INTRAVENOUS ONCE
Status: COMPLETED | OUTPATIENT
Start: 2024-10-12 | End: 2024-10-12

## 2024-10-12 RX ORDER — ONDANSETRON 4 MG/1
4 TABLET, ORALLY DISINTEGRATING ORAL ONCE
Status: DISCONTINUED | OUTPATIENT
Start: 2024-10-12 | End: 2024-10-12

## 2024-10-12 RX ORDER — DOCUSATE SODIUM 100 MG/1
100 CAPSULE, LIQUID FILLED ORAL EVERY 12 HOURS
Qty: 60 CAPSULE | Refills: 0 | Status: SHIPPED | OUTPATIENT
Start: 2024-10-12

## 2024-10-12 RX ORDER — ONDANSETRON 4 MG/1
4 TABLET, FILM COATED ORAL EVERY 8 HOURS PRN
Qty: 10 TABLET | Refills: 0 | Status: SHIPPED | OUTPATIENT
Start: 2024-10-12 | End: 2024-10-15

## 2024-10-12 RX ORDER — SIMETHICONE 80 MG
80 TABLET,CHEWABLE ORAL EVERY 6 HOURS PRN
Qty: 30 TABLET | Refills: 0 | Status: SHIPPED | OUTPATIENT
Start: 2024-10-12

## 2024-10-12 RX ADMIN — IOHEXOL 80 ML: 350 INJECTION, SOLUTION INTRAVENOUS at 14:30

## 2024-10-12 RX ADMIN — SODIUM CHLORIDE 1000 ML: 0.9 INJECTION, SOLUTION INTRAVENOUS at 13:44

## 2024-10-12 RX ADMIN — ONDANSETRON 4 MG: 2 INJECTION INTRAMUSCULAR; INTRAVENOUS at 13:45

## 2024-10-12 NOTE — Clinical Note
Bianca Tilley was seen and treated in our emergency department on 10/12/2024.                Diagnosis:     Bianca  may return to work on return date.    She may return on this date: 10/13/2024         If you have any questions or concerns, please don't hesitate to call.      Dahlia Sanford PA-C    ______________________________           _______________          _______________  Hospital Representative                              Date                                Time

## 2024-10-12 NOTE — ED PROVIDER NOTES
Time reflects when diagnosis was documented in both MDM as applicable and the Disposition within this note       Time User Action Codes Description Comment    10/12/2024  3:58 PM Dahlia Sanford Add [R11.2] Nausea and vomiting     10/12/2024  3:58 PM Dahlia Sanford Add [R10.13] Epigastric abdominal pain     10/12/2024  3:58 PM Dahlia Sanford Add [K64.9] Hemorrhoid           ED Disposition       ED Disposition   Discharge    Condition   Good    Date/Time   Sat Oct 12, 2024  3:57 PM    Comment   Bianca Tilley discharge to home/self care.                   Assessment & Plan       Medical Decision Making  58-year-old female presents reporting nausea vomiting and straining with bowel movements beginning yesterday after she ate suspicious fish the bones of which she reports she removed.  She reports continued epigastric abdominal pain nausea and dizziness/lightheadedness in association with this.  Differential diagnosis includes but is not limited to foodborne illness versus gastroenteritis versus colitis versus other infectious cause, lower suspicion for acute intra-abdominal surgical emergency such as bowel obstruction versus appendicitis due to lack of guarding/peritoneal pain on exam, patient is without urinary symptoms lowering suspicion for pyelonephritis/renal cause.    Work-up to include blood work, CBC as marker of infectivity, hemoconcentration for hydration status, CMP to check for electrolytes, renal function, liver function, Lipase to check for pancreatitis, CT scan to evaluate for potential intra-abdominal surgical pathology. EKG and troponin to evaluate for potential ACS component.    Physical exam demonstrates mild epigastric abdominal tenderness as well as hemorrhoid noted on exam nonthrombosed noted at the 12 o'clock position with a chaperone at bedside.  Workup is grossly negative with significant improvement in symptoms after fluids and Zofran.  High suspicion for foodborne illness versus  "gastroenteritis versus other viral syndrome versus other more benign/functional abdominal pain, patient advised to follow with family care and if needed follow with her gastroenterologist.  Zofran, simethicone, Colace, Proctofoam prescribed for symptomatic use at home.    All imaging and/or lab testing discussed with patient, strict return to ED precautions discussed. Patient and/or family members verbalizes understanding and agrees with plan. Patient is stable for discharge     Portions of the record may have been created with voice recognition software. Occasional wrong word or \"sound a like\" substitutions may have occurred due to the inherent limitations of voice recognition software. Read the chart carefully and recognize, using context, where substitutions have occurred.      Amount and/or Complexity of Data Reviewed  Labs: ordered.  Radiology: ordered.    Risk  Prescription drug management.        ED Course as of 10/12/24 1601   Sat Oct 12, 2024   1552   IMPRESSION:     No acute abdominopelvic findings.         Medications   sodium chloride 0.9 % bolus 1,000 mL (0 mL Intravenous Stopped 10/12/24 1545)   ondansetron (ZOFRAN) injection 4 mg (4 mg Intravenous Given 10/12/24 1345)   iohexol (OMNIPAQUE) 350 MG/ML injection (MULTI-DOSE) 80 mL (80 mL Intravenous Given 10/12/24 1430)       ED Risk Strat Scores                           SBIRT 22yo+      Flowsheet Row Most Recent Value   Initial Alcohol Screen: US AUDIT-C     1. How often do you have a drink containing alcohol? 0 Filed at: 10/12/2024 1316   2. How many drinks containing alcohol do you have on a typical day you are drinking?  0 Filed at: 10/12/2024 1316   3b. FEMALE Any Age, or MALE 65+: How often do you have 4 or more drinks on one occassion? 0 Filed at: 10/12/2024 1316   Audit-C Score 0 Filed at: 10/12/2024 1316   DARLEEN: How many times in the past year have you...    Used an illegal drug or used a prescription medication for non-medical reasons? Never " Filed at: 10/12/2024 3590                            History of Present Illness       Chief Complaint   Patient presents with    Rectal Bleeding - Minor     Patient reports blood in stool. Patient reports began last night.       Past Medical History:   Diagnosis Date    Anxiety     Cervical radiculopathy     Cervical spinal stenosis     Current smoker     Depression     GERD (gastroesophageal reflux disease)     Hiatal hernia     History of transfusion     Myofascial pain syndrome     Polyarthralgia     TIA (transient ischemic attack)     Tobacco dependence     Upper respiratory tract infection     Vertigo       Past Surgical History:   Procedure Laterality Date    CARPAL TUNNEL RELEASE       SECTION      CHOLECYSTECTOMY      COLONOSCOPY      ECTOPIC PREGNANCY SURGERY      HYSTERECTOMY  2009    Total unilateral removal tube and ovary    LAPAROSCOPY      OOPHORECTOMY      has one ovary    MI BLEPHAROPLASTY LOWER EYELID Bilateral 2020    Procedure: BLEPHAROPLASTY LOWER;  Surgeon: Brian Fisher MD;  Location:  MAIN OR;  Service: Plastics    MI EXCISION GANGLION WRIST DORSAL/VOLAR PRIMARY Left 2020    Procedure: EXCISION GANGLION CYST;  Surgeon: Brian Fisher MD;  Location:  MAIN OR;  Service: Plastics    PUBOVAGINAL SLING      REPAIR RECTOCELE      TONSILECTOMY AND ADNOIDECTOMY      TONSILLECTOMY      UPPER GASTROINTESTINAL ENDOSCOPY        Family History   Problem Relation Age of Onset    Asthma Mother     Hypertension Father     Hepatitis Sister         C and B    No Known Problems Daughter     Other Maternal Grandmother         pre-diabetic    No Known Problems Maternal Grandfather     Diabetes Paternal Grandmother     Diabetes Paternal Grandfather     Ulcers Brother     HIV Brother     No Known Problems Son     No Known Problems Maternal Aunt     No Known Problems Maternal Aunt     No Known Problems Maternal Aunt     No Known Problems Maternal Aunt     No Known Problems Maternal Aunt      No Known Problems Maternal Aunt     No Known Problems Paternal Aunt     No Known Problems Paternal Aunt     Thyroid disease Other     Stroke Other     Breast cancer Neg Hx       Social History     Tobacco Use    Smoking status: Some Days     Current packs/day: 0.25     Types: Cigarettes    Smokeless tobacco: Never    Tobacco comments:     using patch and gum   Vaping Use    Vaping status: Never Used   Substance Use Topics    Alcohol use: No    Drug use: Never      E-Cigarette/Vaping    E-Cigarette Use Never User       E-Cigarette/Vaping Substances    Nicotine No     THC No     CBD No     Flavoring No     Other No     Unknown No       I have reviewed and agree with the history as documented.     58-year-old female history of GERD, hiatal hernia, prior cholecystectomy presenting for evaluation of 1 day of epigastric abdominal pain nausea vomiting, straining with bowel movements and blood noted on the stool with bowel movements.  She reports she ate fish last evening and states she feels this was a suspicious food intake for her she reports she diligently removed bones from the fish and does not believe she ate any bones.  Denies any other suspicious food intake or ill contacts.  She reports abdominal surgical history significant only for cholecystectomy.      Rectal Bleeding - Minor  Associated symptoms: abdominal pain and vomiting    Associated symptoms: no dizziness, no fever and no light-headedness        Review of Systems   Constitutional:  Negative for chills, fatigue and fever.   HENT:  Negative for congestion, ear pain, rhinorrhea and sore throat.    Eyes:  Negative for redness.   Respiratory:  Negative for chest tightness and shortness of breath.    Cardiovascular:  Negative for chest pain and palpitations.   Gastrointestinal:  Positive for abdominal pain, blood in stool, constipation, hematochezia, nausea and vomiting.   Genitourinary:  Negative for dysuria and hematuria.   Musculoskeletal: Negative.     Skin:  Negative for rash.   Neurological:  Negative for dizziness, syncope, light-headedness and numbness.           Objective       ED Triage Vitals [10/12/24 1317]   Temperature Pulse Blood Pressure Respirations SpO2 Patient Position - Orthostatic VS   98.3 °F (36.8 °C) 95 141/98 16 98 % Sitting      Temp Source Heart Rate Source BP Location FiO2 (%) Pain Score    Oral Monitor Left arm -- --      Vitals      Date and Time Temp Pulse SpO2 Resp BP Pain Score FACES Pain Rating User   10/12/24 1317 98.3 °F (36.8 °C) 95 98 % 16 141/98 -- --             Physical Exam  Vitals and nursing note reviewed.   Constitutional:       Appearance: She is well-developed.   HENT:      Head: Normocephalic.   Eyes:      General: No scleral icterus.  Cardiovascular:      Rate and Rhythm: Normal rate and regular rhythm.   Pulmonary:      Effort: Pulmonary effort is normal.      Breath sounds: Normal breath sounds. No stridor.   Abdominal:      General: There is no distension.      Palpations: Abdomen is soft.      Tenderness: There is abdominal tenderness. There is no guarding.      Comments: Midepigastric abdominal pain without guarding rigidity or rebound tenderness.  No McBurney's point tenderness and no lower abdominal pain.   Musculoskeletal:         General: Normal range of motion.   Skin:     General: Skin is warm and dry.      Capillary Refill: Capillary refill takes less than 2 seconds.   Neurological:      Mental Status: She is alert and oriented to person, place, and time.   Psychiatric:         Mood and Affect: Mood and affect normal.         Results Reviewed       Procedure Component Value Units Date/Time    HS Troponin I 2hr [[id number]] Collected: 10/12/24 1545    Lab Status: In process Specimen: Blood from Arm, Left Updated: 10/12/24 1547    HS Troponin I 4hr [[id number]]     Lab Status: No result Specimen: Blood     HS Troponin 0hr (reflex protocol) [[id number]]  (Normal) Collected: 10/12/24 1341    Lab Status: Final  result Specimen: Blood from Arm, Left Updated: 10/12/24 1420     hs TnI 0hr 5 ng/L     Comprehensive metabolic panel [[id number]] Collected: 10/12/24 1341    Lab Status: Final result Specimen: Blood from Arm, Left Updated: 10/12/24 1415     Sodium 139 mmol/L      Potassium 3.9 mmol/L      Chloride 104 mmol/L      CO2 26 mmol/L      ANION GAP 9 mmol/L      BUN 14 mg/dL      Creatinine 0.66 mg/dL      Glucose 83 mg/dL      Calcium 9.6 mg/dL      AST 16 U/L      ALT 10 U/L      Alkaline Phosphatase 93 U/L      Total Protein 7.4 g/dL      Albumin 4.5 g/dL      Total Bilirubin 0.33 mg/dL      eGFR 97 ml/min/1.73sq m     Narrative:      National Kidney Disease Foundation guidelines for Chronic Kidney Disease (CKD):     Stage 1 with normal or high GFR (GFR > 90 mL/min/1.73 square meters)    Stage 2 Mild CKD (GFR = 60-89 mL/min/1.73 square meters)    Stage 3A Moderate CKD (GFR = 45-59 mL/min/1.73 square meters)    Stage 3B Moderate CKD (GFR = 30-44 mL/min/1.73 square meters)    Stage 4 Severe CKD (GFR = 15-29 mL/min/1.73 square meters)    Stage 5 End Stage CKD (GFR <15 mL/min/1.73 square meters)  Note: GFR calculation is accurate only with a steady state creatinine    Lipase [[id number]]  (Normal) Collected: 10/12/24 1341    Lab Status: Final result Specimen: Blood from Arm, Left Updated: 10/12/24 1415     Lipase 23 u/L     CBC and differential [[id number]] Collected: 10/12/24 1341    Lab Status: Final result Specimen: Blood from Arm, Left Updated: 10/12/24 1356     WBC 6.26 Thousand/uL      RBC 4.78 Million/uL      Hemoglobin 13.8 g/dL      Hematocrit 44.0 %      MCV 92 fL      MCH 28.9 pg      MCHC 31.4 g/dL      RDW 13.2 %      MPV 9.5 fL      Platelets 204 Thousands/uL      nRBC 0 /100 WBCs      Segmented % 60 %      Immature Grans % 0 %      Lymphocytes % 32 %      Monocytes % 7 %      Eosinophils Relative 1 %      Basophils Relative 0 %      Absolute Neutrophils 3.75 Thousands/µL      Absolute Immature Grans 0.01  Thousand/uL      Absolute Lymphocytes 2.03 Thousands/µL      Absolute Monocytes 0.42 Thousand/µL      Eosinophils Absolute 0.03 Thousand/µL      Basophils Absolute 0.02 Thousands/µL             CT abdomen pelvis with contrast   Final Interpretation by Nav Richard MD (10/12 4730)      No acute abdominopelvic findings.         Resident: ANTONINO WAGNER I, the attending radiologist, have reviewed the images and agree with the final report above.      Workstation performed: [id number]             ECG 12 Lead Documentation Only    Date/Time: 10/12/2024 1:38 PM    Performed by: Dahlia Sanford PA-C  Authorized by: Dahlia Sanford PA-C    Indications / Diagnosis:  Epigastric abd pain  ECG reviewed by me, the ED Provider: yes    Patient location:  ED  Previous ECG:     Comparison to cardiac monitor: Yes    Interpretation:     Interpretation: normal    Rate:     ECG rate:  77    ECG rate assessment: normal    Rhythm:     Rhythm: sinus rhythm    Ectopy:     Ectopy: none    QRS:     QRS axis:  Normal    QRS intervals:  Normal  Conduction:     Conduction: normal    ST segments:     ST segments:  Normal  T waves:     T waves: normal    Comments:        QRS 84          ED Medication and Procedure Management   Prior to Admission Medications   Prescriptions Last Dose Informant Patient Reported? Taking?   ALPRAZolam (XANAX) 0.25 mg tablet   No No   Sig: Take 1 tablet (0.25 mg total) by mouth 3 (three) times a day as needed for anxiety for up to 10 days   busPIRone (BUSPAR) 5 mg tablet  Self No No   Sig: Currently 1 tab daily   May bump up if needed to twice a day   cyclobenzaprine (FLEXERIL) 5 mg tablet   No No   Sig: Take 1 tablet (5 mg total) by mouth 3 (three) times a day as needed for muscle spasms   dicyclomine (BENTYL) 20 mg tablet   No No   Sig: Take 1 tablet (20 mg total) by mouth 2 (two) times a day   famotidine (PEPCID) 40 MG tablet   No No   Sig: Take 1 tablet (40 mg  total) by mouth daily   fluconazole (DIFLUCAN) 200 mg tablet   Yes No   fluticasone (FLONASE) 50 mcg/act nasal spray   No No   Si spray into each nostril daily   hydrOXYzine HCL (ATARAX) 25 mg tablet   No No   Sig: Take 1 tablet (25 mg total) by mouth every 8 (eight) hours as needed for anxiety   ibuprofen (MOTRIN) 600 mg tablet   No No   Sig: Take 1 tablet (600 mg total) by mouth every 6 (six) hours as needed for mild pain   loperamide (IMODIUM) 2 mg capsule   No No   Sig: Take 1 capsule (2 mg total) by mouth 4 (four) times a day as needed for diarrhea   Patient not taking: Reported on 2024   loratadine (CLARITIN) 10 mg tablet   No No   Sig: Take 1 tablet (10 mg total) by mouth daily   meclizine (ANTIVERT) 25 mg tablet   No No   Sig: Take 1 tablet (25 mg total) by mouth 3 (three) times a day as needed for dizziness   metoclopramide (REGLAN) 10 mg tablet   No No   Sig: Take 1 tablet (10 mg total) by mouth every 8 (eight) hours as needed (vomiting)   Patient not taking: Reported on 2024   nicotine (NICODERM CQ) 14 mg/24hr TD 24 hr patch   No No   Sig: Place 1 patch on the skin over 24 hours every 24 hours   nicotine polacrilex (NICORETTE) 4 mg gum   No No   Sig: Chew 1 each (4 mg total) as needed for smoking cessation   omeprazole (PriLOSEC) 40 MG capsule   No No   Sig: Take 1 capsule (40 mg total) by mouth daily      Facility-Administered Medications: None     Patient's Medications   Discharge Prescriptions    DOCUSATE SODIUM (COLACE) 100 MG CAPSULE    Take 1 capsule (100 mg total) by mouth every 12 (twelve) hours       Start Date: 10/12/2024End Date: --       Order Dose: 100 mg       Quantity: 60 capsule    Refills: 0    HYDROCORTISONE-PRAMOXINE (PROCTOFOAM-HC) 1-1 % FOAM RECTAL FOAM    Insert 1 applicator into the rectum 2 (two) times a day       Start Date: 10/12/2024End Date: --       Order Dose: 1 applicator       Quantity: 10 g    Refills: 0    ONDANSETRON (ZOFRAN) 4 MG TABLET    Take 1 tablet (4  mg total) by mouth every 8 (eight) hours as needed for nausea or vomiting for up to 3 days       Start Date: 10/12/2024End Date: 10/15/2024       Order Dose: 4 mg       Quantity: 10 tablet    Refills: 0    SIMETHICONE (MYLICON) 80 MG CHEWABLE TABLET    Chew 1 tablet (80 mg total) every 6 (six) hours as needed for flatulence       Start Date: 10/12/2024End Date: --       Order Dose: 80 mg       Quantity: 30 tablet    Refills: 0     No discharge procedures on file.  ED SEPSIS DOCUMENTATION   Time reflects when diagnosis was documented in both MDM as applicable and the Disposition within this note       Time User Action Codes Description Comment    10/12/2024  3:58 PM Dahlia Sanford [R11.2] Nausea and vomiting     10/12/2024  3:58 PM Dahlia Sanford [R10.13] Epigastric abdominal pain     10/12/2024  3:58 PM Dahlia Sanford [K64.9] Hemorrhoid                  Dahlia Sanford PA-C  10/12/24 4433

## 2024-10-13 LAB
ATRIAL RATE: 77 BPM
P AXIS: 68 DEGREES
PR INTERVAL: 164 MS
QRS AXIS: 48 DEGREES
QRSD INTERVAL: 84 MS
QT INTERVAL: 370 MS
QTC INTERVAL: 418 MS
T WAVE AXIS: 48 DEGREES
VENTRICULAR RATE: 77 BPM

## 2024-10-13 PROCEDURE — 93010 ELECTROCARDIOGRAM REPORT: CPT | Performed by: STUDENT IN AN ORGANIZED HEALTH CARE EDUCATION/TRAINING PROGRAM

## 2024-11-04 DIAGNOSIS — R42 DIZZINESS: ICD-10-CM

## 2024-11-04 DIAGNOSIS — T78.40XD ALLERGY, SUBSEQUENT ENCOUNTER: ICD-10-CM

## 2024-11-05 RX ORDER — FLUTICASONE PROPIONATE 50 MCG
1 SPRAY, SUSPENSION (ML) NASAL DAILY
Qty: 16 G | Refills: 0 | Status: SHIPPED | OUTPATIENT
Start: 2024-11-05

## 2024-11-06 RX ORDER — MECLIZINE HYDROCHLORIDE 25 MG/1
25 TABLET ORAL 3 TIMES DAILY PRN
Qty: 30 TABLET | Refills: 0 | Status: SHIPPED | OUTPATIENT
Start: 2024-11-06

## 2024-11-08 ENCOUNTER — IMMUNIZATIONS (OUTPATIENT)
Dept: FAMILY MEDICINE CLINIC | Facility: CLINIC | Age: 59
End: 2024-11-08
Payer: COMMERCIAL

## 2024-11-08 DIAGNOSIS — Z23 ENCOUNTER FOR IMMUNIZATION: Primary | ICD-10-CM

## 2024-11-08 PROCEDURE — 90673 RIV3 VACCINE NO PRESERV IM: CPT

## 2024-11-08 PROCEDURE — 90471 IMMUNIZATION ADMIN: CPT

## 2024-12-18 ENCOUNTER — HOSPITAL ENCOUNTER (OUTPATIENT)
Dept: MAMMOGRAPHY | Facility: CLINIC | Age: 59
Discharge: HOME | End: 2024-12-18
Payer: COMMERCIAL

## 2024-12-18 VITALS — WEIGHT: 97 LBS | BODY MASS INDEX: 20.36 KG/M2 | HEIGHT: 58 IN

## 2024-12-18 DIAGNOSIS — Z12.31 ENCOUNTER FOR SCREENING MAMMOGRAM FOR MALIGNANT NEOPLASM OF BREAST: ICD-10-CM

## 2024-12-18 PROCEDURE — 77067 SCR MAMMO BI INCL CAD: CPT

## 2024-12-18 PROCEDURE — 77063 BREAST TOMOSYNTHESIS BI: CPT

## 2024-12-31 ENCOUNTER — RESULTS FOLLOW-UP (OUTPATIENT)
Dept: OBGYN CLINIC | Facility: CLINIC | Age: 59
End: 2024-12-31

## 2025-01-15 ENCOUNTER — NURSE TRIAGE (OUTPATIENT)
Age: 60
End: 2025-01-15

## 2025-01-15 NOTE — TELEPHONE ENCOUNTER
Called patient, no answer, left message requesting she return call to have symptoms triaged by nurses.

## 2025-01-15 NOTE — TELEPHONE ENCOUNTER
"Last OV:3/1/23  Last Colonoscopy: 4/3/23    Last EGD: 4/3/23    Next OV:3/3/25    Please advise:  Pt calling in after having an episode of severe upper abdominal pain (10/10) 2 days ago. Pt reports that she only felt relief after having a BM and vomiting. Reports pain is 3/10 now. Pt currently taking Pepcid 40mg and omeprazole 40mg daily and is taking them properly on empty stomach. Reports that she used to be on pantoprazole in the past. Does admit to not following the recommended bland diet, reiterated to pt the importance of following a bland diet to avoid episodes like she experienced. Also recommended to pt that if she has another 10/10 abdominal pain episode, she needs to be seen in the ER immediately. Pt verbalized understanding. Additional ER precautions also reviewed with pt. Understanding verbalized.    Reason for Disposition   Abdominal pain is a chronic symptom (recurrent or ongoing AND lasting > 4 weeks)    Answer Assessment - Initial Assessment Questions  1. LOCATION: \"Where does it hurt?\"       Center upper abdomen  2. RADIATION: \"Does the pain shoot anywhere else?\" (e.g., chest, back)      back  3. ONSET: \"When did the pain begin?\" (e.g., minutes, hours or days ago)       2 days ago  4. SUDDEN: \"Gradual or sudden onset?\"      Sudden after eating   5. PATTERN \"Does the pain come and go, or is it constant?\"      Constant until BM and vomit  6. SEVERITY: \"How bad is the pain?\"  (e.g., Scale 1-10; mild, moderate, or severe)      10/10  7. RECURRENT SYMPTOM: \"Have you ever had this type of stomach pain before?\" If Yes, ask: \"When was the last time?\" and \"What happened that time?\"       6 months ago  8. CAUSE: \"What do you think is causing the stomach pain?\"      unsure  9. RELIEVING/AGGRAVATING FACTORS: \"What makes it better or worse?\" (e.g., antacids, bending or twisting motion, bowel movement)      Having BM and vomiting  10. OTHER SYMPTOMS: \"Do you have any other symptoms?\" (e.g., back pain, diarrhea, " fever, urination pain, vomiting)        Back pain, vomiting    Protocols used: Abdominal Pain - Female-Adult-[place]

## 2025-01-15 NOTE — TELEPHONE ENCOUNTER
Regarding: stomach bloating and pain when, medication not helping.  ----- Message from Kavita STEWARD sent at 1/15/2025 11:09 AM EST -----  Pt called advising that she is having issues with her stomach. She has been experiencing stomach bloating, a lot of pain that makes her want to pass out and high heart beat. She stated she feels like she is dying when this happens the pain is extreme. She says that everything she is eating is causing her to feel ill. The prilosec and pepsid are not working for her either.

## 2025-01-16 DIAGNOSIS — K21.9 GASTROESOPHAGEAL REFLUX DISEASE, UNSPECIFIED WHETHER ESOPHAGITIS PRESENT: ICD-10-CM

## 2025-01-16 RX ORDER — FAMOTIDINE 40 MG/1
40 TABLET, FILM COATED ORAL DAILY
Qty: 90 TABLET | Refills: 0 | Status: SHIPPED | OUTPATIENT
Start: 2025-01-16

## 2025-01-16 RX ORDER — OMEPRAZOLE 40 MG/1
40 CAPSULE, DELAYED RELEASE ORAL DAILY
Qty: 90 CAPSULE | Refills: 0 | Status: SHIPPED | OUTPATIENT
Start: 2025-01-16

## 2025-01-16 NOTE — TELEPHONE ENCOUNTER
Reason for call:   [x] Refill   [] Prior Auth  [] Other:     Office:   [] PCP/Provider -   [x] Specialty/Provider - Gastro    Medication:  famotidine (PEPCID) 40 MG tablet    Dose/Frequency: Take 1 tablet (40 mg total) by mouth daily     Quantity: 90    Pharmacy: [address]      Does the patient have enough for 3 days?   [] Yes   [x] No - Send as HP to POD    Reason for call:   [x] Refill   [] Prior Auth  [] Other:     Office:   [] PCP/Provider -   [x] Specialty/Provider - Gastro    Medication:  omeprazole (PriLOSEC) 40 MG capsule    Dose/Frequency:  Take 1 capsule (40 mg total) by mouth daily,     Quantity: 90    Pharmacy: [address]      Does the patient have enough for 3 days?   [] Yes   [x] No - Send as HP to POD

## 2025-01-22 NOTE — TELEPHONE ENCOUNTER
Patient called in to request Rx for pantoprazole advised that a Rx was sent in from  hosp yesterday to [facility] she understood

## 2025-02-03 ENCOUNTER — OFFICE VISIT (OUTPATIENT)
Dept: GASTROENTEROLOGY | Facility: CLINIC | Age: 60
End: 2025-02-03
Payer: COMMERCIAL

## 2025-02-03 VITALS
OXYGEN SATURATION: 98 % | TEMPERATURE: 97.8 F | HEART RATE: 94 BPM | SYSTOLIC BLOOD PRESSURE: 119 MMHG | WEIGHT: 94.3 LBS | HEIGHT: 57 IN | DIASTOLIC BLOOD PRESSURE: 74 MMHG | BODY MASS INDEX: 20.34 KG/M2

## 2025-02-03 DIAGNOSIS — K21.9 GASTROESOPHAGEAL REFLUX DISEASE, UNSPECIFIED WHETHER ESOPHAGITIS PRESENT: ICD-10-CM

## 2025-02-03 DIAGNOSIS — Z86.0100 PERSONAL HISTORY OF COLON POLYPS, UNSPECIFIED: ICD-10-CM

## 2025-02-03 DIAGNOSIS — R10.13 EPIGASTRIC PAIN: Primary | ICD-10-CM

## 2025-02-03 PROCEDURE — 99213 OFFICE O/P EST LOW 20 MIN: CPT | Performed by: INTERNAL MEDICINE

## 2025-02-03 RX ORDER — PANTOPRAZOLE SODIUM 20 MG/1
20 TABLET, DELAYED RELEASE ORAL DAILY
Qty: 20 TABLET | Refills: 2 | Status: SHIPPED | OUTPATIENT
Start: 2025-02-03

## 2025-02-03 NOTE — PATIENT INSTRUCTIONS
Cut down on the protonix to 20 mg daily.  Remember to take in the morning on an empty stomach and eat within 20-30 minutes.  If symptoms recur, let us know and we can go back up to the 40 mg dose.  Remember that you are due for your colonoscopy next Spring (2026)

## 2025-02-03 NOTE — PROGRESS NOTES
Name: Bianca Tilley      : 1965      MRN: [id number]  Encounter Provider: Danial Conley MD  Encounter Date: 2/3/2025   Encounter department: [facility] [place]  :  Assessment & Plan  Gastroesophageal reflux disease, unspecified whether esophagitis present  Patient has history of GERD and epigastric pain and was seen by fellow, Dr. Couch in 2023.  At that time patient was recommended to start Prilosec 40 mg daily and had EGD and colonoscopy scheduled.  EGD was completely normal with small bowel and gastric biopsies showing no significant abnormalities.  She is doing relatively well since that time however, it appears that she returned to eating poorly and ran out of her Protonix and had recurrent symptoms.  She states that it was significant enough that she had to go to the emergency department in October at which time abdominal imaging showed no acute abnormalities and her abdomen.  She was prescribed Protonix 20 mg daily however she took 40 mg daily and had a complete resolution of symptoms.  At this time, it has been several months since she has had symptoms and I recommend she decrease the dose of Protonix to 20 mg daily.  She is aware that if she has recurrence of GERD or epigastric pain over the next few weeks to increase the dose back up to 40 mg daily and let us know.  She does not require repeat EGD at this time as she has no alarm symptoms, and no breakthrough symptoms.    Orders:    pantoprazole (PROTONIX) 20 mg tablet; Take 1 tablet (20 mg total) by mouth daily    Epigastric pain  See above       Personal history of colon polyps, unspecified  Patient had 4 polyps removed during colonoscopy in , with recommended repeat in .  I reminded her that she is due for colonoscopy in the spring 2026.           History of Present Illness   HPI  Bianca Tilley is a 59 y.o. female with medical history as outlined below who was seen in our office by  fellow, Dr. Quinn of in 2023 for epigastric pain, GERD, nausea, early satiety and history of H. pylori.    She was prescribed Protonix 40 mg daily and underwent EGD shortly thereafter.  EGD was completely normal and biopsy showed no worrisome findings.    She was in her usual state of health until October when she had severe recurrence of pain after running out of Protonix.  Abdominal imaging showed no acute findings.  She was prescribed 20 mg of Protonix, however she took 40 mg daily and has had no recurrent symptoms since that time.    she denies abdominal pain, nausea, vomiting, heartburn, reflux, difficulty swallowing, early satiety, bloating, diarrhea, constipation or straining with passing stools. Ms. Tilley denies recent or history of yellow eyes/skin, dark urine, GI bleeding, abdominal distention with fluid, lower extremity swelling, easy bruising, excessive bleeding, pruritus or confusion.     She states she was eating very poorly at the time and has improved her diet and has lost a few pounds since that time as well.    History obtained from: patient    Review of Systems per HPI    Medical History Reviewed by provider this encounter:  Patient Active Problem List   Diagnosis    Cervical radiculopathy    Myofascial pain syndrome    Cervical spinal stenosis    Polyarthralgia    Chronic bilateral low back pain without sciatica    Paresthesia of lower extremity    Vaginal atrophy    Tobacco dependence    Dyspareunia in female    Abnormal mammogram    Anxiety    Atopic dermatitis    Benign paroxysmal positional vertigo    Carpal tunnel syndrome    Depression    Epicondylitis    Gastroesophageal reflux disease    Idiopathic peripheral neuropathy    Low vitamin D level    Shingles    Ganglion, left wrist    TIA (transient ischemic attack)    Persistent postural-perceptual dizziness    Prediabetes    Polyp of sigmoid colon    Allergies    Acute left-sided thoracic back pain          .  Current Outpatient  Medications on File Prior to Visit   Medication Sig Dispense Refill    fluticasone (FLONASE) 50 mcg/act nasal spray 1 spray into each nostril daily 16 g 0    hydrOXYzine HCL (ATARAX) 25 mg tablet Take 1 tablet (25 mg total) by mouth every 8 (eight) hours as needed for anxiety 20 tablet 0    ibuprofen (MOTRIN) 600 mg tablet Take 1 tablet (600 mg total) by mouth every 6 (six) hours as needed for mild pain 30 tablet 0    loratadine (CLARITIN) 10 mg tablet Take 1 tablet (10 mg total) by mouth daily 90 tablet 3    meclizine (ANTIVERT) 25 mg tablet Take 1 tablet (25 mg total) by mouth 3 (three) times a day as needed for dizziness 30 tablet 0    nicotine (NICODERM CQ) 14 mg/24hr TD 24 hr patch Place 1 patch on the skin over 24 hours every 24 hours 28 patch 0    nicotine polacrilex (NICORETTE) 4 mg gum Chew 1 each (4 mg total) as needed for smoking cessation 100 each 0    sucralfate (CARAFATE) 1 g tablet Take 1 tablet (1 g total) by mouth 3 (three) times a day 90 tablet 0    [DISCONTINUED] famotidine (PEPCID) 40 MG tablet Take 1 tablet (40 mg total) by mouth daily 90 tablet 0    [DISCONTINUED] omeprazole (PriLOSEC) 40 MG capsule Take 1 capsule (40 mg total) by mouth daily 90 capsule 0    [DISCONTINUED] pantoprazole (PROTONIX) 20 mg tablet Take 1 tablet (20 mg total) by mouth daily 20 tablet 0    ALPRAZolam (XANAX) 0.25 mg tablet Take 1 tablet (0.25 mg total) by mouth 3 (three) times a day as needed for anxiety for up to 10 days 10 tablet 0    busPIRone (BUSPAR) 5 mg tablet Currently 1 tab daily   May bump up if needed to twice a day 60 tablet 5    cyclobenzaprine (FLEXERIL) 5 mg tablet Take 1 tablet (5 mg total) by mouth 3 (three) times a day as needed for muscle spasms 30 tablet 0    dicyclomine (BENTYL) 20 mg tablet Take 1 tablet (20 mg total) by mouth 2 (two) times a day 20 tablet 0    docusate sodium (COLACE) 100 mg capsule Take 1 capsule (100 mg total) by mouth every 12 (twelve) hours 60 capsule 0    fluconazole  "(DIFLUCAN) 200 mg tablet       hydrocortisone-pramoxine (PROCTOFOAM-HC) 1-1 % FOAM rectal foam Insert 1 applicator into the rectum 2 (two) times a day 10 g 0    loperamide (IMODIUM) 2 mg capsule Take 1 capsule (2 mg total) by mouth 4 (four) times a day as needed for diarrhea (Patient not taking: Reported on 8/12/2024) 12 capsule 0    metoclopramide (REGLAN) 10 mg tablet Take 1 tablet (10 mg total) by mouth every 8 (eight) hours as needed (vomiting) (Patient not taking: Reported on 8/12/2024) 9 tablet 0    ondansetron (ZOFRAN) 4 mg tablet Take 1 tablet (4 mg total) by mouth every 8 (eight) hours as needed for nausea or vomiting for up to 3 days 10 tablet 0    simethicone (MYLICON) 80 mg chewable tablet Chew 1 tablet (80 mg total) every 6 (six) hours as needed for flatulence 30 tablet 0     No current facility-administered medications on file prior to visit.      Social History     Tobacco Use    Smoking status: Some Days     Current packs/day: 0.25     Types: Cigarettes    Smokeless tobacco: Never    Tobacco comments:     using patch and gum   Vaping Use    Vaping status: Never Used   Substance and Sexual Activity    Alcohol use: No    Drug use: Never    Sexual activity: Yes     Partners: Male     Birth control/protection: Female Sterilization     Comment:  x 17 years        Objective   /74 (BP Location: Left arm)   Pulse 94   Temp 97.8 °F (36.6 °C)   Ht 4' 9\" (1.448 m)   Wt 42.8 kg (94 lb 4.8 oz)   LMP  (LMP Unknown)   SpO2 98%   BMI 20.41 kg/m²      Physical Exam  Vitals and nursing note reviewed.   Constitutional:       General: She is not in acute distress.     Appearance: She is well-developed. She is not ill-appearing.   HENT:      Head: Normocephalic and atraumatic.      Mouth/Throat:      Mouth: Mucous membranes are moist.   Eyes:      General: No scleral icterus.     Extraocular Movements: Extraocular movements intact.   Cardiovascular:      Rate and Rhythm: Normal rate and regular " rhythm.      Pulses: Normal pulses.      Heart sounds: Normal heart sounds. No murmur heard.  Pulmonary:      Effort: Pulmonary effort is normal. No respiratory distress.      Breath sounds: Normal breath sounds.   Abdominal:      General: There is no distension.      Palpations: Abdomen is soft. There is no shifting dullness, fluid wave, hepatomegaly or splenomegaly.      Tenderness: There is no abdominal tenderness.   Musculoskeletal:         General: No swelling. Normal range of motion.      Cervical back: Normal range of motion and neck supple.   Skin:     General: Skin is warm and dry.      Coloration: Skin is not jaundiced.      Findings: No bruising.   Neurological:      Mental Status: She is alert and oriented to person, place, and time.   Psychiatric:         Mood and Affect: Mood normal.

## 2025-02-10 ENCOUNTER — OFFICE VISIT (OUTPATIENT)
Dept: FAMILY MEDICINE CLINIC | Facility: CLINIC | Age: 60
End: 2025-02-10
Payer: COMMERCIAL

## 2025-02-10 VITALS
OXYGEN SATURATION: 99 % | SYSTOLIC BLOOD PRESSURE: 136 MMHG | WEIGHT: 92.6 LBS | DIASTOLIC BLOOD PRESSURE: 80 MMHG | HEIGHT: 58 IN | TEMPERATURE: 97.2 F | BODY MASS INDEX: 19.44 KG/M2 | HEART RATE: 68 BPM

## 2025-02-10 DIAGNOSIS — K21.9 GASTROESOPHAGEAL REFLUX DISEASE, UNSPECIFIED WHETHER ESOPHAGITIS PRESENT: ICD-10-CM

## 2025-02-10 DIAGNOSIS — H81.10 BENIGN PAROXYSMAL POSITIONAL VERTIGO, UNSPECIFIED LATERALITY: ICD-10-CM

## 2025-02-10 DIAGNOSIS — Z00.00 ANNUAL PHYSICAL EXAM: Primary | ICD-10-CM

## 2025-02-10 DIAGNOSIS — T78.40XD ALLERGY, SUBSEQUENT ENCOUNTER: ICD-10-CM

## 2025-02-10 DIAGNOSIS — F41.9 ANXIETY: ICD-10-CM

## 2025-02-10 PROCEDURE — 99396 PREV VISIT EST AGE 40-64: CPT

## 2025-02-10 NOTE — PATIENT INSTRUCTIONS
"Patient Education     Routine physical for adults   The Basics   Written by the doctors and editors at [facility]   What is a physical? -- A physical is a routine visit, or \"check-up,\" with your doctor. You might also hear it called a \"wellness visit\" or \"preventive visit.\"  During each visit, the doctor will:   Ask about your physical and mental health   Ask about your habits, behaviors, and lifestyle   Do an exam   Give you vaccines if needed   Talk to you about any medicines you take   Give advice about your health   Answer your questions  Getting regular check-ups is an important part of taking care of your health. It can help your doctor find and treat any problems you have. But it's also important for preventing health problems.  A routine physical is different from a \"sick visit.\" A sick visit is when you see a doctor because of a health concern or problem. Since physicals are scheduled ahead of time, you can think about what you want to ask the doctor.  How often should I get a physical? -- It depends on your age and health. In general, for people age 21 years and older:   If you are younger than 50 years, you might be able to get a physical every 3 years.   If you are 50 years or older, your doctor might recommend a physical every year.  If you have an ongoing health condition, like diabetes or high blood pressure, your doctor will probably want to see you more often.  What happens during a physical? -- In general, each visit will include:   Physical exam - The doctor or nurse will check your height, weight, heart rate, and blood pressure. They will also look at your eyes and ears. They will ask about how you are feeling and whether you have any symptoms that bother you.   Medicines - It's a good idea to bring a list of all the medicines you take to each doctor visit. Your doctor will talk to you about your medicines and answer any questions. Tell them if you are having any side effects that bother you. You " "should also tell them if you are having trouble paying for any of your medicines.   Habits and behaviors - This includes:   Your diet   Your exercise habits   Whether you smoke, drink alcohol, or use drugs   Whether you are sexually active   Whether you feel safe at home  Your doctor will talk to you about things you can do to improve your health and lower your risk of health problems. They will also offer help and support. For example, if you want to quit smoking, they can give you advice and might prescribe medicines. If you want to improve your diet or get more physical activity, they can help you with this, too.   Lab tests, if needed - The tests you get will depend on your age and situation. For example, your doctor might want to check your:   Cholesterol   Blood sugar   Iron level   Vaccines - The recommended vaccines will depend on your age, health, and what vaccines you already had. Vaccines are very important because they can prevent certain serious or deadly infections.   Discussion of screening - \"Screening\" means checking for diseases or other health problems before they cause symptoms. Your doctor can recommend screening based on your age, risk, and preferences. This might include tests to check for:   Cancer, such as breast, prostate, cervical, ovarian, colorectal, prostate, lung, or skin cancer   Sexually transmitted infections, such as chlamydia and gonorrhea   Mental health conditions like depression and anxiety  Your doctor will talk to you about the different types of screening tests. They can help you decide which screenings to have. They can also explain what the results might mean.   Answering questions - The physical is a good time to ask the doctor or nurse questions about your health. If needed, they can refer you to other doctors or specialists, too.  Adults older than 65 years often need other care, too. As you get older, your doctor will talk to you about:   How to prevent falling at " home   Hearing or vision tests   Memory testing   How to take your medicines safely   Making sure that you have the help and support you need at home  All topics are updated as new evidence becomes available and our peer review process is complete.  This topic retrieved from [employer] on: May 02, 2024.  Topic 001462 Version 1.0  Release: 32.4.3 - C32.122  © 2024 UpToDate, Inc. and/or its affiliates. All rights reserved.  Consumer Information Use and Disclaimer   Disclaimer: This generalized information is a limited summary of diagnosis, treatment, and/or medication information. It is not meant to be comprehensive and should be used as a tool to help the user understand and/or assess potential diagnostic and treatment options. It does NOT include all information about conditions, treatments, medications, side effects, or risks that may apply to a specific patient. It is not intended to be medical advice or a substitute for the medical advice, diagnosis, or treatment of a health care provider based on the health care provider's examination and assessment of a patient's specific and unique circumstances. Patients must speak with a health care provider for complete information about their health, medical questions, and treatment options, including any risks or benefits regarding use of medications. This information does not endorse any treatments or medications as safe, effective, or approved for treating a specific patient. Up[employer], Inc. and its affiliates disclaim any warranty or liability relating to this information or the use thereof.The use of this information is governed by the Terms of Use, available at https://www.woltersInsideTrackuwer.com/en/know/clinical-effectiveness-terms. 2024© UpToDate, Inc. and its affiliates and/or licensors. All rights reserved.  Copyright   © 2024 [employer], [employer] and/or its affiliates. All rights reserved.

## 2025-02-10 NOTE — PROGRESS NOTES
Adult Annual Physical  Name: Bianca Tilley      : 1965      MRN: [id number]  Encounter Provider: THOM Peter  Encounter Date: 2/10/2025   Encounter department: [facility]    Assessment & Plan  Annual physical exam         Benign paroxysmal positional vertigo, unspecified laterality  Well controlled on medications   Still has frequent flares when changes positions too quickly and looking at the light for extended periods   Denies recent falls but can become off balance            Anxiety  Well controlled on current medications-hydroxyzine 25mg PRN          Allergy, subsequent encounter  Current medications: Flonase and Cetirizine          Gastroesophageal reflux disease, unspecified whether esophagitis present  Under the management of GI   Current medications: Pantoprazole 20mg          Immunizations and preventive care screenings were discussed with patient today. Appropriate education was printed on patient's after visit summary.    Counseling:  Alcohol/drug use: discussed moderation in alcohol intake, the recommendations for healthy alcohol use, and avoidance of illicit drug use.  Dental Health: discussed importance of regular tooth brushing, flossing, and dental visits.  Injury prevention: discussed safety/seat belts, safety helmets, smoke detectors, carbon monoxide detectors, and smoking near bedding or upholstery.  Sexual health: discussed sexually transmitted diseases, partner selection, use of condoms, avoidance of unintended pregnancy, and contraceptive alternatives.  Exercise: the importance of regular exercise/physical activity was discussed. Recommend exercise 3-5 times per week for at least 30 minutes.       Depression Screening and Follow-up Plan: Patient was screened for depression during today's encounter. They screened negative with a PHQ-9 score of 0.        History of Present Illness     Adult Annual Physical:  Patient presents for annual  "physical.     Diet and Physical Activity:  - Diet/Nutrition: poor diet.  - Exercise: no formal exercise.    Depression Screening:    - PHQ-9 Score: 0    General Health:  - Sleep: sleeps well.  - Hearing: normal hearing bilateral ears.  - Vision: vision problems, wears glasses, goes for regular eye exams and most recent eye exam < 1 year ago.  - Dental: regular dental visits and brushes teeth twice daily.    /GYN Health:  - Follows with GYN: no.   - History of STDs: no    Advanced Care Planning:  - Has an advanced directive?: no    - Has a durable medical POA?: no    - ACP document given to patient?: no      Review of Systems   Constitutional:  Negative for activity change, chills, fatigue and fever.   HENT:  Negative for congestion, ear pain, rhinorrhea, sore throat and trouble swallowing.    Eyes:  Negative for pain and visual disturbance.   Respiratory:  Negative for cough, chest tightness and shortness of breath.    Cardiovascular:  Negative for chest pain, palpitations and leg swelling.   Gastrointestinal:  Negative for abdominal pain, constipation, diarrhea, nausea and vomiting.   Genitourinary:  Negative for difficulty urinating, dysuria, hematuria and urgency.   Musculoskeletal:  Negative for arthralgias and back pain.   Skin:  Negative for color change and rash.   Neurological:  Negative for dizziness, seizures, syncope and headaches.   Psychiatric/Behavioral:  Negative for dysphoric mood. The patient is not nervous/anxious.    All other systems reviewed and are negative.    Medical History Reviewed by provider this encounter:  Tobacco  Allergies  Meds  Problems  Med Hx  Surg Hx  Fam Hx     .    Objective   /80 (BP Location: Left arm, Patient Position: Sitting, Cuff Size: Adult)   Pulse 68   Temp (!) 97.2 °F (36.2 °C) (Temporal)   Ht 4' 9.9\" (1.471 m)   Wt 42 kg (92 lb 9.6 oz)   LMP  (LMP Unknown)   SpO2 99%   BMI 19.42 kg/m²     Physical Exam  Vitals and nursing note reviewed. "   Constitutional:       General: She is not in acute distress.     Appearance: Normal appearance. She is well-developed and normal weight.   HENT:      Head: Normocephalic and atraumatic.      Right Ear: Tympanic membrane, ear canal and external ear normal. There is no impacted cerumen.      Left Ear: Tympanic membrane, ear canal and external ear normal. There is no impacted cerumen.      Nose: Nose normal.      Mouth/Throat:      Mouth: Mucous membranes are moist.      Pharynx: Oropharynx is clear.   Eyes:      Extraocular Movements: Extraocular movements intact.      Conjunctiva/sclera: Conjunctivae normal.      Pupils: Pupils are equal, round, and reactive to light.   Cardiovascular:      Rate and Rhythm: Normal rate and regular rhythm.      Pulses: Normal pulses.      Heart sounds: Normal heart sounds. No murmur heard.  Pulmonary:      Effort: Pulmonary effort is normal. No respiratory distress.      Breath sounds: Normal breath sounds.   Abdominal:      General: Bowel sounds are normal.      Palpations: Abdomen is soft.      Tenderness: There is no abdominal tenderness.   Musculoskeletal:         General: Normal range of motion.      Cervical back: Normal range of motion and neck supple.      Right lower leg: No edema.      Left lower leg: No edema.   Skin:     General: Skin is warm and dry.      Capillary Refill: Capillary refill takes less than 2 seconds.   Neurological:      General: No focal deficit present.      Mental Status: She is alert and oriented to person, place, and time. Mental status is at baseline.   Psychiatric:         Mood and Affect: Mood normal.         Behavior: Behavior normal.         Thought Content: Thought content normal.         Judgment: Judgment normal.       Administrative Statements   I have spent a total time of 40 minutes in caring for this patient on the day of the visit/encounter including Diagnostic results, Prognosis, Risks and benefits of tx options, Instructions for  "management, Patient and family education, Importance of tx compliance, Risk factor reductions, Impressions, Counseling / Coordination of care, Documenting in the medical record, Reviewing / ordering tests, medicine, procedures  , and Obtaining or reviewing history  .     Patient Instructions   Patient Education     Routine physical for adults   The Basics   Written by the doctors and editors at [facility]   What is a physical? -- A physical is a routine visit, or \"check-up,\" with your doctor. You might also hear it called a \"wellness visit\" or \"preventive visit.\"  During each visit, the doctor will:   Ask about your physical and mental health   Ask about your habits, behaviors, and lifestyle   Do an exam   Give you vaccines if needed   Talk to you about any medicines you take   Give advice about your health   Answer your questions  Getting regular check-ups is an important part of taking care of your health. It can help your doctor find and treat any problems you have. But it's also important for preventing health problems.  A routine physical is different from a \"sick visit.\" A sick visit is when you see a doctor because of a health concern or problem. Since physicals are scheduled ahead of time, you can think about what you want to ask the doctor.  How often should I get a physical? -- It depends on your age and health. In general, for people age 21 years and older:   If you are younger than 50 years, you might be able to get a physical every 3 years.   If you are 50 years or older, your doctor might recommend a physical every year.  If you have an ongoing health condition, like diabetes or high blood pressure, your doctor will probably want to see you more often.  What happens during a physical? -- In general, each visit will include:   Physical exam - The doctor or nurse will check your height, weight, heart rate, and blood pressure. They will also look at your eyes and ears. They will ask about how you are " "feeling and whether you have any symptoms that bother you.   Medicines - It's a good idea to bring a list of all the medicines you take to each doctor visit. Your doctor will talk to you about your medicines and answer any questions. Tell them if you are having any side effects that bother you. You should also tell them if you are having trouble paying for any of your medicines.   Habits and behaviors - This includes:   Your diet   Your exercise habits   Whether you smoke, drink alcohol, or use drugs   Whether you are sexually active   Whether you feel safe at home  Your doctor will talk to you about things you can do to improve your health and lower your risk of health problems. They will also offer help and support. For example, if you want to quit smoking, they can give you advice and might prescribe medicines. If you want to improve your diet or get more physical activity, they can help you with this, too.   Lab tests, if needed - The tests you get will depend on your age and situation. For example, your doctor might want to check your:   Cholesterol   Blood sugar   Iron level   Vaccines - The recommended vaccines will depend on your age, health, and what vaccines you already had. Vaccines are very important because they can prevent certain serious or deadly infections.   Discussion of screening - \"Screening\" means checking for diseases or other health problems before they cause symptoms. Your doctor can recommend screening based on your age, risk, and preferences. This might include tests to check for:   Cancer, such as breast, prostate, cervical, ovarian, colorectal, prostate, lung, or skin cancer   Sexually transmitted infections, such as chlamydia and gonorrhea   Mental health conditions like depression and anxiety  Your doctor will talk to you about the different types of screening tests. They can help you decide which screenings to have. They can also explain what the results might mean.   Answering " questions - The physical is a good time to ask the doctor or nurse questions about your health. If needed, they can refer you to other doctors or specialists, too.  Adults older than 65 years often need other care, too. As you get older, your doctor will talk to you about:   How to prevent falling at home   Hearing or vision tests   Memory testing   How to take your medicines safely   Making sure that you have the help and support you need at home  All topics are updated as new evidence becomes available and our peer review process is complete.  This topic retrieved from [employer] on: May 02, 2024.  Topic 711011 Version 1.0  Release: 32.4.3 - C32.122  © 2024 [employer] and/or its affiliates. All rights reserved.  Consumer Information Use and Disclaimer   Disclaimer: This generalized information is a limited summary of diagnosis, treatment, and/or medication information. It is not meant to be comprehensive and should be used as a tool to help the user understand and/or assess potential diagnostic and treatment options. It does NOT include all information about conditions, treatments, medications, side effects, or risks that may apply to a specific patient. It is not intended to be medical advice or a substitute for the medical advice, diagnosis, or treatment of a health care provider based on the health care provider's examination and assessment of a patient's specific and unique circumstances. Patients must speak with a health care provider for complete information about their health, medical questions, and treatment options, including any risks or benefits regarding use of medications. This information does not endorse any treatments or medications as safe, effective, or approved for treating a specific patient. [employer] Inc[employer] and its affiliates disclaim any warranty or liability relating to this information or the use thereof.The use of this information is governed by the Terms of Use, available at  https://www.woltersRent.comuwer.com/en/know/clinical-effectiveness-terms. 2024© [employer] and its affiliates and/or licensors. All rights reserved.  Copyright   © 2024 [employer] and/or its affiliates. All rights reserved.

## 2025-02-11 PROBLEM — N95.2 VAGINAL ATROPHY: Status: RESOLVED | Noted: 2018-08-16 | Resolved: 2025-02-11

## 2025-02-11 PROBLEM — R20.2 PARESTHESIA OF LOWER EXTREMITY: Status: RESOLVED | Noted: 2018-02-05 | Resolved: 2025-02-11

## 2025-02-11 PROBLEM — IMO0002 EPICONDYLITIS: Status: RESOLVED | Noted: 2017-07-28 | Resolved: 2025-02-11

## 2025-02-11 PROBLEM — H81.8X9 PERSISTENT POSTURAL-PERCEPTUAL DIZZINESS: Status: RESOLVED | Noted: 2022-01-14 | Resolved: 2025-02-11

## 2025-02-11 PROBLEM — M54.12 CERVICAL RADICULOPATHY: Status: RESOLVED | Noted: 2018-02-05 | Resolved: 2025-02-11

## 2025-02-24 DIAGNOSIS — K21.9 GASTROESOPHAGEAL REFLUX DISEASE, UNSPECIFIED WHETHER ESOPHAGITIS PRESENT: Primary | ICD-10-CM

## 2025-02-24 RX ORDER — SUCRALFATE 1 G/1
1 TABLET ORAL 3 TIMES DAILY
Qty: 90 TABLET | Refills: 0 | Status: CANCELLED | OUTPATIENT
Start: 2025-02-24 | End: 2025-03-26

## 2025-02-24 NOTE — TELEPHONE ENCOUNTER
Patients GI provider:  Dr. Conley    Number to return call: [phone]    Reason for call: Pt called in to request a 90-ay refill for pantoprazole.     Scheduled procedure/appointment date if applicable: n/a

## 2025-02-25 DIAGNOSIS — T78.40XD ALLERGY, SUBSEQUENT ENCOUNTER: ICD-10-CM

## 2025-02-26 RX ORDER — FLUTICASONE PROPIONATE 50 MCG
1 SPRAY, SUSPENSION (ML) NASAL DAILY
Qty: 16 G | Refills: 3 | Status: SHIPPED | OUTPATIENT
Start: 2025-02-26

## 2025-02-28 RX ORDER — PANTOPRAZOLE SODIUM 20 MG/1
TABLET, DELAYED RELEASE ORAL
Qty: 270 TABLET | Refills: 2 | Status: SHIPPED | OUTPATIENT
Start: 2025-02-28

## 2025-03-12 DIAGNOSIS — F17.200 TOBACCO DEPENDENCE: ICD-10-CM

## 2025-03-14 RX ORDER — NICOTINE 21 MG/24HR
1 PATCH, TRANSDERMAL 24 HOURS TRANSDERMAL EVERY 24 HOURS
Qty: 28 PATCH | Refills: 0 | Status: SHIPPED | OUTPATIENT
Start: 2025-03-14

## 2025-04-08 ENCOUNTER — HOSPITAL ENCOUNTER (EMERGENCY)
Facility: HOSPITAL | Age: 60
Discharge: HOME | End: 2025-04-08
Attending: EMERGENCY MEDICINE
Payer: COMMERCIAL

## 2025-04-08 ENCOUNTER — APPOINTMENT (EMERGENCY)
Dept: RADIOLOGY | Facility: HOSPITAL | Age: 60
End: 2025-04-08
Payer: COMMERCIAL

## 2025-04-08 VITALS
TEMPERATURE: 98.1 F | OXYGEN SATURATION: 98 % | SYSTOLIC BLOOD PRESSURE: 146 MMHG | RESPIRATION RATE: 18 BRPM | DIASTOLIC BLOOD PRESSURE: 90 MMHG | HEART RATE: 70 BPM

## 2025-04-08 DIAGNOSIS — M46.1 SACROILIITIS (HCC): Primary | ICD-10-CM

## 2025-04-08 DIAGNOSIS — M79.18 MYOFASCIAL PAIN SYNDROME: ICD-10-CM

## 2025-04-08 DIAGNOSIS — M62.830 PARASPINAL MUSCLE SPASM: ICD-10-CM

## 2025-04-08 PROCEDURE — 99283 EMERGENCY DEPT VISIT LOW MDM: CPT

## 2025-04-08 PROCEDURE — 72200 X-RAY EXAM SI JOINTS: CPT

## 2025-04-08 PROCEDURE — 99284 EMERGENCY DEPT VISIT MOD MDM: CPT | Performed by: EMERGENCY MEDICINE

## 2025-04-08 RX ORDER — PREDNISONE 20 MG/1
40 TABLET ORAL ONCE
Status: COMPLETED | OUTPATIENT
Start: 2025-04-08 | End: 2025-04-08

## 2025-04-08 RX ORDER — DIAZEPAM 5 MG/1
5 TABLET ORAL
Qty: 5 TABLET | Refills: 0 | Status: SHIPPED | OUTPATIENT
Start: 2025-04-08 | End: 2025-04-13

## 2025-04-08 RX ORDER — LIDOCAINE 50 MG/G
1 PATCH TOPICAL ONCE
Status: DISCONTINUED | OUTPATIENT
Start: 2025-04-08 | End: 2025-04-08 | Stop reason: HOSPADM

## 2025-04-08 RX ORDER — PREDNISONE 10 MG/1
40 TABLET ORAL DAILY
Qty: 20 TABLET | Refills: 0 | Status: SHIPPED | OUTPATIENT
Start: 2025-04-08

## 2025-04-08 RX ORDER — LIDOCAINE 50 MG/G
1 PATCH TOPICAL DAILY
Qty: 10 PATCH | Refills: 0 | Status: SHIPPED | OUTPATIENT
Start: 2025-04-08

## 2025-04-08 RX ORDER — HYDROCODONE BITARTRATE AND ACETAMINOPHEN 5; 325 MG/1; MG/1
1 TABLET ORAL EVERY 6 HOURS PRN
Qty: 10 TABLET | Refills: 0 | Status: SHIPPED | OUTPATIENT
Start: 2025-04-08

## 2025-04-08 RX ADMIN — PREDNISONE 40 MG: 20 TABLET ORAL at 15:37

## 2025-04-08 RX ADMIN — LIDOCAINE 1 PATCH: 700 PATCH TOPICAL at 15:37

## 2025-04-08 NOTE — Clinical Note
Bianca Tilley was seen and treated in our emergency department on 4/8/2025.            No lifting greater than 5 pounds for three days    Diagnosis: left sacroiliitis    Bianca  may return to work on return date.    She may return on this date: 04/09/2025         If you have any questions or concerns, please don't hesitate to call.      Vinnie Blackwell, DO    ______________________________           _______________          _______________  Hospital Representative                              Date                                Time

## 2025-04-08 NOTE — ED PROVIDER NOTES
Time reflects when diagnosis was documented in both MDM as applicable and the Disposition within this note       Time User Action Codes Description Comment    4/8/2025  3:08 PM Vinnie Blackwell [M46.1] Sacroiliitis (HCC)     4/8/2025  3:09 PM Vinnie Blackwell [M62.830] Paraspinal muscle spasm     4/8/2025  3:14 PM Vinnie Blackwell [M79.18] Myofascial pain syndrome           ED Disposition       ED Disposition   Discharge    Condition   Stable    Date/Time   Tue Apr 8, 2025  3:08 PM    Comment   Bianca Tilley discharge to home/self care.                   Assessment & Plan       Medical Decision Making    MDM/DDx: Lumbosacral back pain - sacroiliitis, MS sprain/strain, less likely but at risk for HNP, vertebral fracture, transverse myelitis or epidural abscess, cauda equina syndrome, mets/cancer, AAA/dissection.     I independently reviewed and interpreted ordered imaging from this encounter.    A/P: Will check x-rays, treat symptoms, reevaluate for further work up and disposition to follow up with [facility] for possible MRI or PT.    Amount and/or Complexity of Data Reviewed  Radiology: ordered and independent interpretation performed. Decision-making details documented in ED Course.    Risk  Prescription drug management.             Medications   predniSONE tablet 40 mg (40 mg Oral Given 4/8/25 1537)       ED Risk Strat Scores                    No data recorded                            History of Present Illness       Chief Complaint   Patient presents with    Back Pain     Left sided lower back pain for about 1.5 weeks. Took ibuprofen about 1.5 hrs pta       Past Medical History:   Diagnosis Date    Anxiety     Atopic dermatitis 08/11/2014    Carpal tunnel syndrome 08/11/2014    Cervical radiculopathy     Cervical spinal stenosis     Current smoker 08/11/2014    Depression     GERD (gastroesophageal reflux disease)     Hiatal hernia     History of transfusion 1985    Idiopathic peripheral  neuropathy 2016    Myofascial pain syndrome     Paresthesia of lower extremity 2018    Persistent postural-perceptual dizziness 2022    Polyarthralgia     Shingles 2014    TIA (transient ischemic attack)     Tobacco dependence 2019    Upper respiratory tract infection 2023    Vaginal atrophy 2018    Vertigo       Past Surgical History:   Procedure Laterality Date    CARPAL TUNNEL RELEASE       SECTION      CHOLECYSTECTOMY      COLONOSCOPY      ECTOPIC PREGNANCY SURGERY      HYSTERECTOMY  2009    Total unilateral removal tube and ovary    LAPAROSCOPY      OOPHORECTOMY      has one ovary    MA BLEPHAROPLASTY LOWER EYELID Bilateral 2020    Procedure: BLEPHAROPLASTY LOWER;  Surgeon: Brian Fisher MD;  Location:  MAIN OR;  Service: Plastics    MA EXCISION GANGLION WRIST DORSAL/VOLAR PRIMARY Left 2020    Procedure: EXCISION GANGLION CYST;  Surgeon: Brian Fisher MD;  Location:  MAIN OR;  Service: Plastics    PUBOVAGINAL SLING      REPAIR RECTOCELE      TONSILECTOMY AND ADNOIDECTOMY      TONSILLECTOMY      UPPER GASTROINTESTINAL ENDOSCOPY        Family History   Problem Relation Age of Onset    Asthma Mother     Hypertension Father     Hepatitis Sister         C and B    No Known Problems Daughter     Other Maternal Grandmother         pre-diabetic    No Known Problems Maternal Grandfather     Diabetes Paternal Grandmother     Diabetes Paternal Grandfather     Ulcers Brother     HIV Brother     No Known Problems Son     No Known Problems Maternal Aunt     No Known Problems Maternal Aunt     No Known Problems Maternal Aunt     No Known Problems Maternal Aunt     No Known Problems Maternal Aunt     No Known Problems Maternal Aunt     No Known Problems Paternal Aunt     No Known Problems Paternal Aunt     Thyroid disease Other     Stroke Other     Breast cancer Neg Hx       Social History     Tobacco Use    Smoking status: Some Days     Current packs/day:  0.25     Types: Cigarettes     Passive exposure: Current    Smokeless tobacco: Never    Tobacco comments:     using patch and gum   Vaping Use    Vaping status: Never Used   Substance Use Topics    Alcohol use: No    Drug use: Never      E-Cigarette/Vaping    E-Cigarette Use Never User       E-Cigarette/Vaping Substances    Nicotine No     THC No     CBD No     Flavoring No     Other No     Unknown No       I have reviewed and agree with the history as documented.     59-year-old female presents for evaluation of left low back pain with radiation into her left groin, left anterior thigh and left lateral thigh.  Symptoms started about a week and a half ago after she was bending at the waist to clear a cabinet under a sink and then helped to move a bucket.  She denies any direct trauma or overexertion evident at the time of injury.  However, later that evening, she began to feel pain which has worsened over the days.  She is now having difficulty ambulating due to the pain and feels that her left foot is getting numb.  She does have a history of thoracolumbar back pain for which she has had imaging and saw pain management but this is different.  She has not had prior imaging specifically of her LS spine.  She has tried multiple OTC NSAIDs plus muscle relaxant without relief.  Ice was briefly helpful.    ROS: Denies HA, neck pain, CP, SOB, abdominal pain. 12 system ROS o/w negative. No LE weakness, incontinence/retention of urine.    Back Exam: Able to ambulate to exam area with an antalgic gait but without assistance. No surface trauma or scars. No midline point tenderness, step-off or deformity of the thoracic or lumbar spine. Moderate left lumbar paraspinal muscle tenderness to palpation. No CVA tenderness to percussion.moderate left SI joint tenderness and prominence. Good dorsalis pedal pulses bilaterally. Range of motion is not limited.     Heel and toe walk with full bilateral strength. Strong and symmetrical  dorsi/plantar ankle flexion. SLR test is negative for radiculopathy. No saddle anesthesia.        History provided by:  Patient and medical records  Back Pain  Location:  Sacro-iliac joint  Quality:  Aching and shooting  Radiates to:  L thigh  Pain severity:  Moderate  Pain is:  Same all the time  Onset quality:  Gradual  Duration:  10 days  Timing:  Constant  Progression:  Worsening  Chronicity:  New  Context: lifting heavy objects, physical stress and twisting    Context: not occupational injury, not recent illness and not recent injury    Relieved by:  Nothing  Worsened by:  Ambulation and bending (Prolonged sitting)  Ineffective treatments:  NSAIDs and muscle relaxants  Associated symptoms: leg pain and numbness    Associated symptoms: no abdominal pain, no abdominal swelling, no bladder incontinence, no bowel incontinence, no chest pain, no dysuria, no fever, no pelvic pain, no perianal numbness, no tingling and no weakness    Risk factors: not obese, not pregnant, no recent surgery and no steroid use        Review of Systems   Constitutional:  Negative for chills and fever.   HENT: Negative.     Respiratory:  Negative for cough, chest tightness, shortness of breath and wheezing.    Cardiovascular:  Negative for chest pain.   Gastrointestinal:  Negative for abdominal distention, abdominal pain, bowel incontinence, constipation, diarrhea, nausea and vomiting.        No bowel or bladder incontinence   Endocrine: Negative for polyuria.   Genitourinary:  Negative for bladder incontinence, dysuria, flank pain, frequency, pelvic pain and urgency.   Musculoskeletal:  Positive for back pain and gait problem (Due to back pain). Negative for neck pain and neck stiffness.   Skin:  Negative for pallor.   Neurological:  Positive for numbness. Negative for tingling and weakness.        No saddle anesthesia, pain radiation or referral. Normal straight leg raises.   Psychiatric/Behavioral: Negative.     All other systems  reviewed and are negative.          Objective       ED Triage Vitals [04/08/25 1453]   Temperature Pulse Blood Pressure Respirations SpO2 Patient Position - Orthostatic VS   98.1 °F (36.7 °C) 70 146/90 18 98 % Lying      Temp Source Heart Rate Source BP Location FiO2 (%) Pain Score    Oral Monitor Right arm -- 9      Vitals      Date and Time Temp Pulse SpO2 Resp BP Pain Score FACES Pain Rating User   04/08/25 1453 98.1 °F (36.7 °C) 70 98 % 18 146/90 9 -- DR            Physical Exam  Vitals reviewed.   Constitutional:       General: She is in acute distress.      Appearance: She is well-developed. She is not toxic-appearing or diaphoretic.   HENT:      Head: Normocephalic and atraumatic.      Right Ear: External ear normal.      Left Ear: External ear normal.      Nose: Nose normal.      Mouth/Throat:      Mouth: Mucous membranes are moist.      Pharynx: Oropharynx is clear.   Eyes:      General: No scleral icterus.     Conjunctiva/sclera: Conjunctivae normal.      Pupils: Pupils are equal, round, and reactive to light.   Neck:      Comments: No midline cervical TTP  Cardiovascular:      Rate and Rhythm: Normal rate and regular rhythm.      Heart sounds: No murmur heard.  Pulmonary:      Effort: Pulmonary effort is normal.      Breath sounds: Normal breath sounds.   Abdominal:      General: Bowel sounds are normal.      Palpations: Abdomen is soft.      Tenderness: There is no abdominal tenderness. There is no right CVA tenderness.   Musculoskeletal:         General: Tenderness (Left SI joint and paraspinal musculature) and deformity (Prominent left SI joint, particularly as compared to the right) present. Normal range of motion.      Cervical back: Normal range of motion and neck supple.      Lumbar back: Spasms, tenderness and bony tenderness present. No deformity.      Right lower leg: No edema.      Left lower leg: No edema.   Skin:     General: Skin is warm and dry.      Findings: No bruising.   Neurological:       General: No focal deficit present.      Mental Status: She is alert and oriented to person, place, and time.      Motor: No weakness.      Deep Tendon Reflexes: Reflexes are normal and symmetric.   Psychiatric:         Behavior: Behavior normal.         Thought Content: Thought content normal.         Results Reviewed       None            XR sacroiliac joints < 3 views   Final Interpretation by Jose Luis Muir MD ([id number])      Unremarkable SI joints.         Workstation performed: [id number]             Procedures    ED Medication and Procedure Management   Prior to Admission Medications   Prescriptions Last Dose Informant Patient Reported? Taking?   Ketotifen Fumarate (ZADITOR) 0.035 % ophthalmic solution   No No   Sig: Administer 1 drop to both eyes 2 (two) times a day   fluticasone (FLONASE) 50 mcg/act nasal spray   No No   Si spray into each nostril daily   hydrOXYzine HCL (ATARAX) 25 mg tablet   No No   Sig: Take 1 tablet (25 mg total) by mouth every 8 (eight) hours as needed for anxiety   ibuprofen (MOTRIN) 600 mg tablet   No No   Sig: Take 1 tablet (600 mg total) by mouth every 6 (six) hours as needed for mild pain   loratadine (CLARITIN) 10 mg tablet   No No   Sig: Take 1 tablet (10 mg total) by mouth daily   meclizine (ANTIVERT) 25 mg tablet   No No   Sig: Take 1 tablet (25 mg total) by mouth 3 (three) times a day as needed for dizziness   nicotine (NICODERM CQ) 14 mg/24hr TD 24 hr patch   No No   Sig: Place 1 patch on the skin over 24 hours every 24 hours   nicotine polacrilex (NICORETTE) 4 mg gum   No No   Sig: Chew 1 each (4 mg total) as needed for smoking cessation   pantoprazole (PROTONIX) 20 mg tablet   No No   Sig: Take 1 tablet (20 mg total) by mouth daily,   sucralfate (CARAFATE) 1 g tablet   No No   Sig: Take 1 tablet (1 g total) by mouth 3 (three) times a day      Facility-Administered Medications: None     Discharge Medication List as of 2025  3:15 PM        START taking these  medications    Details   diazepam (VALIUM) 5 mg tablet Take 1 tablet (5 mg total) by mouth daily at bedtime for 5 days, Starting Tue 4/8/2025, Until Sun 4/13/2025, Normal      HYDROcodone-acetaminophen (NORCO) 5-325 mg per tablet Take 1 tablet by mouth every 6 (six) hours as needed for pain for up to 10 doses Max Daily Amount: 4 tablets, Starting Tue 4/8/2025, Normal      lidocaine (LIDODERM) 5 % Apply 1 patch topically over 12 hours daily Remove & Discard patch within 12 hours or as directed by MD, Starting Tue 4/8/2025, Normal      predniSONE 10 mg tablet Take 4 tablets (40 mg total) by mouth daily, Starting Tue 4/8/2025, Normal           CONTINUE these medications which have NOT CHANGED    Details   fluticasone (FLONASE) 50 mcg/act nasal spray 1 spray into each nostril daily, Starting Wed 2/26/2025, Normal      hydrOXYzine HCL (ATARAX) 25 mg tablet Take 1 tablet (25 mg total) by mouth every 8 (eight) hours as needed for anxiety, Starting Wed 7/24/2024, Normal      Ketotifen Fumarate (ZADITOR) 0.035 % ophthalmic solution Administer 1 drop to both eyes 2 (two) times a day, Starting Wed 3/5/2025, Normal      loratadine (CLARITIN) 10 mg tablet Take 1 tablet (10 mg total) by mouth daily, Starting Fri 8/30/2024, Normal      meclizine (ANTIVERT) 25 mg tablet Take 1 tablet (25 mg total) by mouth 3 (three) times a day as needed for dizziness, Starting Wed 11/6/2024, Normal      nicotine (NICODERM CQ) 14 mg/24hr TD 24 hr patch Place 1 patch on the skin over 24 hours every 24 hours, Starting Fri 3/14/2025, Normal      nicotine polacrilex (NICORETTE) 4 mg gum Chew 1 each (4 mg total) as needed for smoking cessation, Starting Wed 2/7/2024, Normal      pantoprazole (PROTONIX) 20 mg tablet Take 1 tablet (20 mg total) by mouth daily,, Normal      sucralfate (CARAFATE) 1 g tablet Take 1 tablet (1 g total) by mouth 3 (three) times a day, Starting Tue 1/21/2025, Until Thu 2/20/2025, Normal           STOP taking these medications        ibuprofen (MOTRIN) 600 mg tablet Comments:   Reason for Stopping:             No discharge procedures on file.  ED SEPSIS DOCUMENTATION   Time reflects when diagnosis was documented in both MDM as applicable and the Disposition within this note       Time User Action Codes Description Comment    4/8/2025  3:08 PM Vinnie Blackwell [M46.1] Sacroiliitis (HCC)     4/8/2025  3:09 PM Vinnie Blackwell [M62.830] Paraspinal muscle spasm     4/8/2025  3:14 PM Vinnie Blackwell [M79.18] Myofascial pain syndrome                  Vinnie Blackwell DO  04/08/25 2004

## 2025-04-14 ENCOUNTER — ANNUAL EXAM (OUTPATIENT)
Dept: OBGYN CLINIC | Facility: CLINIC | Age: 60
End: 2025-04-14
Payer: COMMERCIAL

## 2025-04-14 VITALS
SYSTOLIC BLOOD PRESSURE: 130 MMHG | WEIGHT: 90 LBS | DIASTOLIC BLOOD PRESSURE: 82 MMHG | HEIGHT: 57 IN | BODY MASS INDEX: 19.41 KG/M2

## 2025-04-14 DIAGNOSIS — Z01.419 ENCOUNTER FOR ANNUAL ROUTINE GYNECOLOGICAL EXAMINATION: Primary | ICD-10-CM

## 2025-04-14 DIAGNOSIS — Z13.820 SCREENING FOR OSTEOPOROSIS: ICD-10-CM

## 2025-04-14 DIAGNOSIS — Z78.0 POSTMENOPAUSAL: ICD-10-CM

## 2025-04-14 DIAGNOSIS — Z91.89 AT HIGH RISK FOR OSTEOPOROSIS: ICD-10-CM

## 2025-04-14 DIAGNOSIS — F17.200 SMOKER: ICD-10-CM

## 2025-04-14 DIAGNOSIS — Z12.31 ENCOUNTER FOR SCREENING MAMMOGRAM FOR MALIGNANT NEOPLASM OF BREAST: ICD-10-CM

## 2025-04-14 PROCEDURE — S0612 ANNUAL GYNECOLOGICAL EXAMINA: HCPCS | Performed by: PHYSICIAN ASSISTANT

## 2025-04-14 NOTE — PROGRESS NOTES
Name: Bianca Tilley      : 1965      MRN: [id number]  Encounter Provider: Fabiola Mccoy PA-C  Encounter Date: 2025   Encounter department: [facility] [place]  :  Assessment & Plan  Encounter for annual routine gynecological examination  Cervical cancer screening:  Cervix surgically absent. Normal vaginal exam. Pap no longer indicated  STD screening:  Patient declines.   Breast cancer screening:  Normal breast exam. Reviewed breast self-awareness. Mammo ordered  Colon cancer screening:  Up to date.Due   DEXA: multiple risk factors - smoker, , low BMI. Recommended DEXA for evaluation.   Depression Screening: Patient's depression screening was assessed with a PHQ-2 score of 0.   BMI Counseling: Body mass index is 19.48 kg/m². Discussed diet and exercise recommendations.    Return to office 1 year for annual exam, sooner PRN / otherwise directed .         Encounter for screening mammogram for malignant neoplasm of breast    Orders:    Mammo screening bilateral w 3d and cad; Future    Screening for osteoporosis  Multiple risk factors. Encouraged early screening DEXA. She will check insurance.   Orders:    DXA bone density spine hip and pelvis; Future    Postmenopausal    Orders:    DXA bone density spine hip and pelvis; Future    Smoker    Orders:    DXA bone density spine hip and pelvis; Future    Body mass index (BMI) 19.9 or less, adult    Orders:    DXA bone density spine hip and pelvis; Future    At high risk for osteoporosis    Orders:    DXA bone density spine hip and pelvis; Future        History of Present Illness   Bianca Tilley is a 59 y.o.  who is s/p  hysterectomy presenting for annual exam.  She is without complaint.    Last Pap: N/a - s/p hysterectomy   Last Mammo: 2024 BIRADS 1 - Negative, fibroglandular density .   Last Colonoscopy: 2023 3 year recall   Last DEXA: n/a    Hysterectomy (fibroids and AUB), unilateral  oophorectomy (presence of large cyst) in 2009. Also had pubovaginal sling and rectocele repair at that time.      Sexually active: with her   Concerns: denies pain, bleeding, dryness     ASCCP guidelines reviewed and this low risk patient was advised she meets criteria to d/c pap as cervix is surgically absent for benign indication.    Urinary concerns: some mild urgency. Does not find bothersome. Not interested in intervention.  Breast concerns denies      Pertinent Family Hx:   Family hx of breast cancer: no  Family hx of ovarian cancer: no  Family hx of colon cancer: no      Review of Systems   Constitutional: Negative.         No breast concerns.   Gastrointestinal: Negative.    Genitourinary:  Negative for difficulty urinating, dysuria, frequency, pelvic pain, urgency, vaginal bleeding and vaginal discharge.   Skin: Negative.    Neurological:  Negative for headaches.   Psychiatric/Behavioral: Negative.       Medical History Reviewed by provider this encounter:  Tobacco  Allergies  Meds  Problems  Med Hx  Surg Hx  Fam Hx     .  Current Outpatient Medications on File Prior to Visit   Medication Sig Dispense Refill    fluticasone (FLONASE) 50 mcg/act nasal spray 1 spray into each nostril daily 16 g 3    hydrOXYzine HCL (ATARAX) 25 mg tablet Take 1 tablet (25 mg total) by mouth every 8 (eight) hours as needed for anxiety 20 tablet 0    lidocaine (LIDODERM) 5 % Apply 1 patch topically over 12 hours daily Remove & Discard patch within 12 hours or as directed by MD 10 patch 0    loratadine (CLARITIN) 10 mg tablet Take 1 tablet (10 mg total) by mouth daily 90 tablet 3    meclizine (ANTIVERT) 25 mg tablet Take 1 tablet (25 mg total) by mouth 3 (three) times a day as needed for dizziness 30 tablet 0    nicotine (NICODERM CQ) 14 mg/24hr TD 24 hr patch Place 1 patch on the skin over 24 hours every 24 hours 28 patch 0    predniSONE 10 mg tablet Take 4 tablets (40 mg total) by mouth daily 20 tablet 0     "diazepam (VALIUM) 5 mg tablet Take 1 tablet (5 mg total) by mouth daily at bedtime for 5 days (Patient not taking: Reported on 4/14/2025) 5 tablet 0    HYDROcodone-acetaminophen (NORCO) 5-325 mg per tablet Take 1 tablet by mouth every 6 (six) hours as needed for pain for up to 10 doses Max Daily Amount: 4 tablets (Patient not taking: Reported on 4/14/2025) 10 tablet 0    Ketotifen Fumarate (ZADITOR) 0.035 % ophthalmic solution Administer 1 drop to both eyes 2 (two) times a day (Patient not taking: Reported on 4/14/2025) 5 mL 0    nicotine polacrilex (NICORETTE) 4 mg gum Chew 1 each (4 mg total) as needed for smoking cessation (Patient not taking: Reported on 4/14/2025) 100 each 0    pantoprazole (PROTONIX) 20 mg tablet Take 1 tablet (20 mg total) by mouth daily, (Patient not taking: Reported on 4/14/2025) 270 tablet 2    sucralfate (CARAFATE) 1 g tablet Take 1 tablet (1 g total) by mouth 3 (three) times a day 90 tablet 0     No current facility-administered medications on file prior to visit.      Social History     Tobacco Use    Smoking status: Some Days     Current packs/day: 0.25     Types: Cigarettes     Passive exposure: Current    Smokeless tobacco: Never    Tobacco comments:     using patch and gum   Vaping Use    Vaping status: Never Used   Substance and Sexual Activity    Alcohol use: No    Drug use: Never    Sexual activity: Yes     Partners: Male     Birth control/protection: Female Sterilization     Comment:  x 17 years        Objective   /82 (BP Location: Left arm, Patient Position: Sitting, Cuff Size: Standard)   Ht 4' 9\" (1.448 m)   Wt 40.8 kg (90 lb)   LMP  (LMP Unknown)   BMI 19.48 kg/m²      Physical Exam  Vitals and nursing note reviewed.   Constitutional:       General: She is not in acute distress.     Appearance: Normal appearance.   HENT:      Head: Normocephalic and atraumatic.   Eyes:      Conjunctiva/sclera: Conjunctivae normal.   Pulmonary:      Effort: Pulmonary effort " is normal.   Chest:   Breasts:     Breasts are symmetrical.      Right: Normal.      Left: Normal.   Abdominal:      General: There is no distension.      Palpations: Abdomen is soft.      Tenderness: There is no abdominal tenderness.   Genitourinary:     General: Normal vulva.      Pubic Area: No rash.       Labia:         Right: No rash or lesion.         Left: No rash or lesion.       Vagina: Normal.      Comments: Cervix and uterus are surgically absent. No adnexal mass or tenderness.   Lymphadenopathy:      Upper Body:      Right upper body: No supraclavicular or axillary adenopathy.      Left upper body: No supraclavicular or axillary adenopathy.      Lower Body: No right inguinal adenopathy. No left inguinal adenopathy.   Skin:     General: Skin is warm and dry.   Neurological:      General: No focal deficit present.      Mental Status: She is alert.      Cranial Nerves: No cranial nerve deficit.   Psychiatric:         Mood and Affect: Mood normal.         Behavior: Behavior normal.

## 2025-04-24 DIAGNOSIS — F41.9 ANXIETY: ICD-10-CM

## 2025-04-24 RX ORDER — HYDROXYZINE HYDROCHLORIDE 25 MG/1
25 TABLET, FILM COATED ORAL EVERY 8 HOURS PRN
Qty: 20 TABLET | Refills: 0 | Status: SHIPPED | OUTPATIENT
Start: 2025-04-24

## 2025-04-25 ENCOUNTER — TELEPHONE (OUTPATIENT)
Age: 60
End: 2025-04-25

## 2025-04-25 DIAGNOSIS — F41.9 ANXIETY: Primary | ICD-10-CM

## 2025-04-25 RX ORDER — BUSPIRONE HYDROCHLORIDE 5 MG/1
5 TABLET ORAL DAILY
Qty: 30 TABLET | Refills: 0 | Status: SHIPPED | OUTPATIENT
Start: 2025-04-25

## 2025-04-25 NOTE — TELEPHONE ENCOUNTER
Buspirone refilled and sent to pharmacy. I started her back on 5mg daily, if this is not working she needs to come in for an appointment to discuss.

## 2025-04-25 NOTE — TELEPHONE ENCOUNTER
Patient called asking if [facility] would be able to fill Buspirone 5mg for her, states she was previously taking this but had stopped for a while and feels that it was a mistake . Patient is requesting to start the medication again . Made her aware she may need to come in for an appointment to be seen to start again, patient understood . Please advise and return call .

## 2025-05-15 ENCOUNTER — TELEPHONE (OUTPATIENT)
Dept: EMERGENCY DEPT | Facility: HOSPITAL | Age: 60
End: 2025-05-15

## 2025-05-15 DIAGNOSIS — R11.0 NAUSEA: Primary | ICD-10-CM

## 2025-05-15 RX ORDER — ONDANSETRON 4 MG/1
4 TABLET, FILM COATED ORAL EVERY 6 HOURS
Qty: 12 TABLET | Refills: 0 | Status: SHIPPED | OUTPATIENT
Start: 2025-05-15

## 2025-05-23 DIAGNOSIS — F41.9 ANXIETY: ICD-10-CM

## 2025-05-23 RX ORDER — BUSPIRONE HYDROCHLORIDE 5 MG/1
5 TABLET ORAL DAILY
Qty: 90 TABLET | Refills: 0 | Status: SHIPPED | OUTPATIENT
Start: 2025-05-23

## 2025-05-29 DIAGNOSIS — K21.9 GASTROESOPHAGEAL REFLUX DISEASE, UNSPECIFIED WHETHER ESOPHAGITIS PRESENT: ICD-10-CM

## 2025-05-29 RX ORDER — PANTOPRAZOLE SODIUM 40 MG/1
40 TABLET, DELAYED RELEASE ORAL DAILY
Qty: 90 TABLET | Refills: 3 | Status: SHIPPED | OUTPATIENT
Start: 2025-05-29

## 2025-06-05 ENCOUNTER — TELEPHONE (OUTPATIENT)
Age: 60
End: 2025-06-05

## 2025-06-05 NOTE — TELEPHONE ENCOUNTER
Patient called into confirm if her refill for   pantoprazole was received. Advised that it went to [facility]

## 2025-06-16 ENCOUNTER — OFFICE VISIT (OUTPATIENT)
Dept: FAMILY MEDICINE CLINIC | Facility: CLINIC | Age: 60
End: 2025-06-16
Payer: COMMERCIAL

## 2025-06-16 VITALS
SYSTOLIC BLOOD PRESSURE: 120 MMHG | HEART RATE: 67 BPM | DIASTOLIC BLOOD PRESSURE: 90 MMHG | BODY MASS INDEX: 19.8 KG/M2 | WEIGHT: 91.8 LBS | OXYGEN SATURATION: 100 % | TEMPERATURE: 97.2 F | HEIGHT: 57 IN

## 2025-06-16 DIAGNOSIS — M71.22 BAKER'S CYST OF KNEE, LEFT: Primary | ICD-10-CM

## 2025-06-16 PROCEDURE — 99213 OFFICE O/P EST LOW 20 MIN: CPT

## 2025-06-16 NOTE — PROGRESS NOTES
Name: Bianca Tilley      : 1965      MRN: [id number]  Encounter Provider: THOM Peter  Encounter Date: 2025   Encounter department: [facility]  :  Assessment & Plan  Baker's cyst of knee, left  Baker's cyst of left knee     Natural history and expected course discussed. Questions answered.  Educational materials distributed.  Orthopedics referral..    Bianca Tilley is a 59 y.o. female who presents with knee pain involving the left knee. Onset was gradual, starting about a few weeks ago. Inciting event: none known. Current symptoms include: stiffness and numbness/tingling. Pain is aggravated by any weight bearing, inactivity, lateral movements, and rising after sitting. Patient has had no prior knee problems. Evaluation to date: none. Treatment to date: avoidance of offending activity and rest.              History of Present Illness   Knee Pain   The incident occurred more than 1 week ago. The incident occurred at home. The pain is present in the left knee. The quality of the pain is described as aching. Associated symptoms include muscle weakness, numbness and tingling. She reports no foreign bodies present. The symptoms are aggravated by movement. She has tried rest for the symptoms. The treatment provided no relief.     Review of Systems   Constitutional:  Negative for activity change, chills, fatigue and fever.   HENT:  Negative for congestion, ear pain, rhinorrhea, sore throat and trouble swallowing.    Eyes:  Negative for pain and visual disturbance.   Respiratory:  Negative for cough, chest tightness and shortness of breath.    Cardiovascular:  Negative for chest pain, palpitations and leg swelling.   Gastrointestinal:  Negative for abdominal pain, constipation, diarrhea, nausea and vomiting.   Genitourinary:  Negative for difficulty urinating, dysuria, hematuria and urgency.   Musculoskeletal:  Negative for arthralgias and back pain.   Skin:   "Negative for color change and rash.   Neurological:  Positive for tingling and numbness. Negative for dizziness, seizures, syncope and headaches.   Psychiatric/Behavioral:  Negative for dysphoric mood. The patient is not nervous/anxious.    All other systems reviewed and are negative.      Objective   /90 (BP Location: Left arm, Patient Position: Sitting, Cuff Size: Adult)   Pulse 67   Temp (!) 97.2 °F (36.2 °C) (Temporal)   Ht 4' 9\" (1.448 m)   Wt 41.6 kg (91 lb 12.8 oz)   LMP  (LMP Unknown)   SpO2 100%   BMI 19.87 kg/m²      Physical Exam  Vitals and nursing note reviewed.   Constitutional:       General: She is not in acute distress.     Appearance: Normal appearance. She is well-developed and normal weight.   HENT:      Head: Normocephalic and atraumatic.      Right Ear: Tympanic membrane, ear canal and external ear normal. There is no impacted cerumen.      Left Ear: Tympanic membrane, ear canal and external ear normal. There is no impacted cerumen.      Nose: Nose normal.      Mouth/Throat:      Mouth: Mucous membranes are moist.      Pharynx: Oropharynx is clear.     Eyes:      Extraocular Movements: Extraocular movements intact.      Conjunctiva/sclera: Conjunctivae normal.      Pupils: Pupils are equal, round, and reactive to light.       Cardiovascular:      Rate and Rhythm: Normal rate and regular rhythm.      Pulses: Normal pulses.      Heart sounds: Normal heart sounds. No murmur heard.  Pulmonary:      Effort: Pulmonary effort is normal. No respiratory distress.      Breath sounds: Normal breath sounds.   Abdominal:      General: Bowel sounds are normal.      Palpations: Abdomen is soft.      Tenderness: There is no abdominal tenderness.     Musculoskeletal:         General: Normal range of motion.      Cervical back: Normal range of motion and neck supple.      Right knee: Tenderness present over the PCL.      Right lower leg: No edema.      Left lower leg: No edema.     Skin:     " General: Skin is warm and dry.      Capillary Refill: Capillary refill takes less than 2 seconds.     Neurological:      General: No focal deficit present.      Mental Status: She is alert and oriented to person, place, and time. Mental status is at baseline.     Psychiatric:         Mood and Affect: Mood normal.         Behavior: Behavior normal.         Thought Content: Thought content normal.         Judgment: Judgment normal.       Administrative Statements   I have spent a total time of 20 minutes in caring for this patient on the day of the visit/encounter including Diagnostic results, Prognosis, Risks and benefits of tx options, Instructions for management, Patient and family education, Importance of tx compliance, Risk factor reductions, Impressions, Counseling / Coordination of care, Documenting in the medical record, Reviewing/placing orders in the medical record (including tests, medications, and/or procedures), and Obtaining or reviewing history  .

## 2025-06-30 ENCOUNTER — OFFICE VISIT (OUTPATIENT)
Dept: OBGYN CLINIC | Facility: OTHER | Age: 60
End: 2025-06-30
Payer: COMMERCIAL

## 2025-06-30 ENCOUNTER — APPOINTMENT (OUTPATIENT)
Dept: RADIOLOGY | Facility: OTHER | Age: 60
End: 2025-06-30
Attending: ORTHOPAEDIC SURGERY
Payer: COMMERCIAL

## 2025-06-30 VITALS — BODY MASS INDEX: 19.31 KG/M2 | HEIGHT: 58 IN | WEIGHT: 92 LBS

## 2025-06-30 DIAGNOSIS — M25.562 LEFT KNEE PAIN, UNSPECIFIED CHRONICITY: ICD-10-CM

## 2025-06-30 DIAGNOSIS — S83.207A POSITIVE MCMURRAY TEST OF LEFT KNEE, INITIAL ENCOUNTER: Primary | ICD-10-CM

## 2025-06-30 DIAGNOSIS — M71.22 BAKER'S CYST OF KNEE, LEFT: ICD-10-CM

## 2025-06-30 PROCEDURE — 99204 OFFICE O/P NEW MOD 45 MIN: CPT | Performed by: ORTHOPAEDIC SURGERY

## 2025-06-30 PROCEDURE — 73562 X-RAY EXAM OF KNEE 3: CPT

## 2025-06-30 RX ORDER — NAPROXEN 500 MG/1
500 TABLET ORAL 2 TIMES DAILY WITH MEALS
Qty: 20 TABLET | Refills: 0 | Status: SHIPPED | OUTPATIENT
Start: 2025-06-30

## 2025-06-30 NOTE — ASSESSMENT & PLAN NOTE
Orders:    XR knee 3 vw left non injury; Future    naproxen (NAPROSYN) 500 mg tablet; Take 1 tablet (500 mg total) by mouth 2 (two) times a day with meals    MRI knee left  wo contrast; Future

## 2025-06-30 NOTE — PROGRESS NOTES
Name: Bianca Tilley      : 1965      MRN: [id number]  Encounter Provider: Delio Whelan MD  Encounter Date: 2025   Encounter department: [facility]  :  Assessment & Plan  Positive Reanna test of left knee, initial encounter  I explained my current clinical findings and reviewed radiological findings with the patient.  Her clinical assessment is suspicious for potential lateral meniscus tear of the left knee.  She reports significant intensity pain of the left knee.  Will request an MRI of the left knee for further evaluation in this regard.  Will also prescribe her oral naproxen for pain relief as needed.  Follow-up after left knee MRI.  Orders:    naproxen (NAPROSYN) 500 mg tablet; Take 1 tablet (500 mg total) by mouth 2 (two) times a day with meals    MRI knee left  wo contrast; Future    Left knee pain, unspecified chronicity    Orders:    XR knee 3 vw left non injury; Future    naproxen (NAPROSYN) 500 mg tablet; Take 1 tablet (500 mg total) by mouth 2 (two) times a day with meals    MRI knee left  wo contrast; Future    Baker's cyst of knee, left    Orders:    Ambulatory Referral to Orthopedic Surgery        History of Present Illness   HPI  Bianca Tilley is a 59 y.o. female who presents for left knee pain and suspected Baker's cyst.  She has been referred in this regard by her primary care provider THOM Peter.  Patient reports that she had a known history of left knee Baker's cyst many years ago thinks that her left knee problem is secondary to Baker's cyst.  She has started noticing pain in the lateral and posterolateral aspect of the left knee for nearly 3 months.  She does not remember any specific injury prior to the onset of symptoms.  Symptoms are aggravated with laying down, twisting turning and squatting type activities.  Does not report any specific locking episodes.  Pain intensity is severe.      Review of Systems       Objective  "  LMP  (LMP Unknown)      Physical Exam    Musculoskeletal:      Left knee: No effusion.      Instability Tests: Lateral Reanna test positive. Medial Reanna test negative.              Left Knee Exam     Tenderness   The patient is experiencing tenderness in the lateral joint line (Tender to palpation over the lateral and posterolateral joint line).    Range of Motion   Extension:  normal   Flexion:  normal     Tests   Reanna:  Medial - negative Lateral - positive  Varus: negative Valgus: negative  Lachman:  Anterior - negative        Other   Erythema: absent  Effusion: no effusion present    Comments:  There is no palpable cystic swelling noted in the popliteal fossa.  Point-of-care sonographic assessment of the medial popliteal fossa did not reveal any significantly sized Baker's cyst.  Very small cystic collection between the semimembranosus and medial gastrinomas head.  However, this was not the area of patient's clinical tenderness.  Negative Homans' sign.  No pain with passive SLR.  No pain with resisted left ankle plantarflexion with the knee extended.           I have personally reviewed pertinent films in PACS and my interpretation is plain radiograph of the left knee performed.  Does not reveal any acute osseous injury or significant degenerative changes.  Superior patellar pole enthesophyte noted..   Procedures  Portions of the record may have been created with voice recognition software. Occasional wrong word or \"sound alike\" substitutions may have occurred due to the inherent limitations of voice recognition software. Please review the chart carefully and recognize, using context, where substitutions/typographical errors may have occurred.      "

## 2025-06-30 NOTE — ASSESSMENT & PLAN NOTE
I explained my current clinical findings and reviewed radiological findings with the patient.  Her clinical assessment is suspicious for potential lateral meniscus tear of the left knee.  She reports significant intensity pain of the left knee.  Will request an MRI of the left knee for further evaluation in this regard.  Will also prescribe her oral naproxen for pain relief as needed.  Follow-up after left knee MRI.  Orders:    naproxen (NAPROSYN) 500 mg tablet; Take 1 tablet (500 mg total) by mouth 2 (two) times a day with meals    MRI knee left  wo contrast; Future

## 2025-07-14 ENCOUNTER — APPOINTMENT (OUTPATIENT)
Dept: LAB | Facility: HOSPITAL | Age: 60
End: 2025-07-14
Payer: COMMERCIAL

## 2025-07-14 ENCOUNTER — HOSPITAL ENCOUNTER (OUTPATIENT)
Dept: MRI IMAGING | Facility: HOSPITAL | Age: 60
Discharge: HOME | End: 2025-07-14
Attending: ORTHOPAEDIC SURGERY
Payer: COMMERCIAL

## 2025-07-14 DIAGNOSIS — M25.562 LEFT KNEE PAIN, UNSPECIFIED CHRONICITY: ICD-10-CM

## 2025-07-14 DIAGNOSIS — Z00.8 ENCOUNTER FOR OTHER GENERAL EXAMINATION: ICD-10-CM

## 2025-07-14 DIAGNOSIS — S83.207A POSITIVE MCMURRAY TEST OF LEFT KNEE, INITIAL ENCOUNTER: ICD-10-CM

## 2025-07-14 LAB
CHOLEST SERPL-MCNC: 209 MG/DL (ref ?–200)
EST. AVERAGE GLUCOSE BLD GHB EST-MCNC: 123 MG/DL
HBA1C MFR BLD: 5.9 %
HDLC SERPL-MCNC: 76 MG/DL
LDLC SERPL CALC-MCNC: 116 MG/DL (ref 0–100)
NONHDLC SERPL-MCNC: 133 MG/DL
TRIGL SERPL-MCNC: 86 MG/DL (ref ?–150)

## 2025-07-14 PROCEDURE — 80061 LIPID PANEL: CPT

## 2025-07-14 PROCEDURE — 36415 COLL VENOUS BLD VENIPUNCTURE: CPT

## 2025-07-14 PROCEDURE — 73721 MRI JNT OF LWR EXTRE W/O DYE: CPT

## 2025-07-14 PROCEDURE — 83036 HEMOGLOBIN GLYCOSYLATED A1C: CPT

## (undated) DEVICE — STERILE POLYISOPRENE POWDER-FREE SURGICAL GLOVES: Brand: PROTEXIS

## (undated) DEVICE — LIGHT GLOVE GREEN

## (undated) DEVICE — NEEDLE 30 G X 1/2

## (undated) DEVICE — CABLE BIPOLAR DISP MEGADYNE

## (undated) DEVICE — KERLIX BANDAGE ROLL: Brand: KERLIX

## (undated) DEVICE — SKIN MARKER DUAL TIP WITH RULER CAP, FLEXIBLE RULER AND LABELS: Brand: DEVON

## (undated) DEVICE — 1820 FOAM BLOCK NEEDLE COUNTER: Brand: DEVON

## (undated) DEVICE — INTENDED FOR TISSUE SEPARATION, AND OTHER PROCEDURES THAT REQUIRE A SHARP SURGICAL BLADE TO PUNCTURE OR CUT.: Brand: BARD-PARKER ® SAFETYLOCK CARBON RIB-BACK BLADES

## (undated) DEVICE — DRAPE FLUID WARMER (BIRD BATH)

## (undated) DEVICE — OCCLUSIVE GAUZE STRIP,3% BISMUTH TRIBROMOPHENATE IN PETROLATUM BLEND: Brand: XEROFORM

## (undated) DEVICE — SYRINGE 10ML LL CONTROL TOP

## (undated) DEVICE — INTENDED FOR TISSUE SEPARATION, AND OTHER PROCEDURES THAT REQUIRE A SHARP SURGICAL BLADE TO PUNCTURE OR CUT.: Brand: BARD-PARKER SAFETY BLADES SIZE 15, STERILE

## (undated) DEVICE — Device

## (undated) DEVICE — SUT ETHILON 6-0 P-3 18 IN 1698G

## (undated) DEVICE — LAMINECTOMY ARM CRADLE FOAM POSITIONER: Brand: CARDINAL HEALTH

## (undated) DEVICE — ACE WRAP 4 IN UNSTERILE

## (undated) DEVICE — BANDAGE ROLL,100% COTTON, 6 PLY, LARGE: Brand: KERLIX

## (undated) DEVICE — ICE PACK EYE

## (undated) DEVICE — GAUZE SPONGES,16 PLY: Brand: CURITY

## (undated) DEVICE — NEEDLE 25G X 1 1/2

## (undated) DEVICE — BASIC PACK: Brand: CONVERTORS

## (undated) DEVICE — NEEDLE BLUNT 18 G X 1 1/2IN

## (undated) DEVICE — SUT ETHILON 6-0 PS-3 18 IN 1665G

## (undated) DEVICE — COTTON TIP APPLICTOR 2 PK

## (undated) DEVICE — STERILE POLYISOPRENE POWDER-FREE SURGICAL GLOVES WITH EMOLLIENT COATING: Brand: PROTEXIS

## (undated) DEVICE — WET SKIN PREP TRAY: Brand: MEDLINE INDUSTRIES, INC.

## (undated) DEVICE — CUFF TOURNIQUET DISP SZ18

## (undated) DEVICE — SYRINGE 30ML LL

## (undated) DEVICE — SUT VICRYL 4-0 PS-2 18 IN J496G

## (undated) DEVICE — SUT ETHILON 5-0 PS-2 18 IN 1666G

## (undated) DEVICE — STOCKINETTE 2P PREROLLD 6X60

## (undated) DEVICE — BETHLEHEM UNIVERSAL  MIONR EXT: Brand: CARDINAL HEALTH

## (undated) DEVICE — TIBURON SPLIT SHEET: Brand: CONVERTORS

## (undated) DEVICE — TUBING SUCTION 5MM X 12 FT

## (undated) DEVICE — LUBRICANT SURGILUBE TUBE 4 OZ  FLIP TOP